# Patient Record
Sex: FEMALE | Race: WHITE | Employment: FULL TIME | URBAN - METROPOLITAN AREA
[De-identification: names, ages, dates, MRNs, and addresses within clinical notes are randomized per-mention and may not be internally consistent; named-entity substitution may affect disease eponyms.]

---
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
## 2021-03-09 ENCOUNTER — HOSPITAL ENCOUNTER (OUTPATIENT)
Facility: MEDICAL CENTER | Age: 21
Discharge: HOME | End: 2021-03-09
Payer: COMMERCIAL

## 2021-03-09 DIAGNOSIS — K90.9 IRON MALABSORPTION: ICD-10-CM

## 2021-03-09 DIAGNOSIS — D50.8 IRON DEFICIENCY ANEMIA SECONDARY TO INADEQUATE DIETARY IRON INTAKE: ICD-10-CM

## 2021-03-09 LAB
ABSOLUTE EOS #: 0.1 K/UL (ref 0–0.44)
ABSOLUTE IMMATURE GRANULOCYTE: 0.02 K/UL (ref 0–0.3)
ABSOLUTE LYMPH #: 1.6 K/UL (ref 1.2–5.2)
ABSOLUTE MONO #: 0.51 K/UL (ref 0.1–1.4)
BASOPHILS # BLD: 1 % (ref 0–2)
BASOPHILS ABSOLUTE: 0.05 K/UL (ref 0–0.2)
DIFFERENTIAL TYPE: NORMAL
EOSINOPHILS RELATIVE PERCENT: 2 % (ref 1–4)
FERRITIN: 28 UG/L (ref 13–150)
HCT VFR BLD CALC: 42.9 % (ref 36.3–47.1)
HEMOGLOBIN: 14.1 G/DL (ref 11.9–15.1)
IMMATURE GRANULOCYTES: 0 %
IRON SATURATION: 23 % (ref 20–55)
IRON: 75 UG/DL (ref 37–145)
LYMPHOCYTES # BLD: 26 % (ref 25–45)
MCH RBC QN AUTO: 28.3 PG (ref 25.2–33.5)
MCHC RBC AUTO-ENTMCNC: 32.9 G/DL (ref 28.4–34.8)
MCV RBC AUTO: 86 FL (ref 82.6–102.9)
MONOCYTES # BLD: 8 % (ref 2–8)
NRBC AUTOMATED: 0 PER 100 WBC
PDW BLD-RTO: 12.3 % (ref 11.8–14.4)
PLATELET # BLD: 225 K/UL (ref 138–453)
PLATELET ESTIMATE: NORMAL
PMV BLD AUTO: 9.4 FL (ref 8.1–13.5)
RBC # BLD: 4.99 M/UL (ref 3.95–5.11)
RBC # BLD: NORMAL 10*6/UL
SEG NEUTROPHILS: 63 % (ref 34–64)
SEGMENTED NEUTROPHILS ABSOLUTE COUNT: 3.91 K/UL (ref 1.8–8)
TOTAL IRON BINDING CAPACITY: 326 UG/DL (ref 250–450)
UNSATURATED IRON BINDING CAPACITY: 251 UG/DL (ref 112–347)
WBC # BLD: 6.2 K/UL (ref 4.5–13.5)
WBC # BLD: NORMAL 10*3/UL

## 2021-03-09 PROCEDURE — 85025 COMPLETE CBC W/AUTO DIFF WBC: CPT

## 2021-03-09 PROCEDURE — 83540 ASSAY OF IRON: CPT

## 2021-03-09 PROCEDURE — 83550 IRON BINDING TEST: CPT

## 2021-03-09 PROCEDURE — 36415 COLL VENOUS BLD VENIPUNCTURE: CPT

## 2021-03-09 PROCEDURE — 82728 ASSAY OF FERRITIN: CPT

## 2021-03-11 ENCOUNTER — HOSPITAL ENCOUNTER (OUTPATIENT)
Facility: MEDICAL CENTER | Age: 21
End: 2021-03-11
Payer: COMMERCIAL

## 2021-03-16 ENCOUNTER — OFFICE VISIT (OUTPATIENT)
Dept: ONCOLOGY | Age: 21
End: 2021-03-16
Payer: COMMERCIAL

## 2021-03-16 ENCOUNTER — TELEPHONE (OUTPATIENT)
Dept: ONCOLOGY | Age: 21
End: 2021-03-16

## 2021-03-16 VITALS
RESPIRATION RATE: 16 BRPM | WEIGHT: 243.8 LBS | BODY MASS INDEX: 34.98 KG/M2 | TEMPERATURE: 97.6 F | DIASTOLIC BLOOD PRESSURE: 97 MMHG | SYSTOLIC BLOOD PRESSURE: 136 MMHG | HEART RATE: 98 BPM

## 2021-03-16 DIAGNOSIS — D50.8 IRON DEFICIENCY ANEMIA SECONDARY TO INADEQUATE DIETARY IRON INTAKE: Primary | ICD-10-CM

## 2021-03-16 PROCEDURE — 99214 OFFICE O/P EST MOD 30 MIN: CPT | Performed by: INTERNAL MEDICINE

## 2021-03-16 PROCEDURE — 99211 OFF/OP EST MAY X REQ PHY/QHP: CPT | Performed by: INTERNAL MEDICINE

## 2021-03-16 NOTE — TELEPHONE ENCOUNTER
Donaldo Rizvi MD VISIT  DR Sg Arzate IN TO SEE PATIENT  ORDERS RECEIVED  BENTYL  RV 6 MONTHS W/LABS BEFORE  LABS CDP FE TIBC FERRITIN TO BE DONE WEEK PRIOR TO APPT, ORDERS GIVEN TO PATIENT  MD VISIT 9/16/21 @4PM   AVS PRINTED AND GIVEN TO PATIENT WITH INSTRUCTIONS  PATIENT DISCHARGED AMBULATORY

## 2021-03-16 NOTE — PROGRESS NOTES
_      Chief Complaint   Patient presents with    Follow-up    Discuss Labs     DIAGNOSIS:        Microcytic anemia  Iron deficiency  Heavy menstrual periods  Upper GI symptoms with intolerable side effects to oral iron     CURRENT THERAPY:         Status post IV iron infusion August 2020  Patient did not tolerate oral iron    BRIEF CASE HISTORY:      Ms. Henrene Seip is a very pleasant 24 y.o. female with history of multiple co morbidities as listed. Prophylactic left importance she has history of neurocardiogenic syncope and episodes of dizziness and headaches. The patient is referred for evaluation of microcytic anemia. Patient has weakness and fatigue. She has palpitation. Shortness of breath on exertion. Patient denies any active bleeding however she describes heavy menstrual periods with passage of clots. Patient feels worse during her menstrual periods. She has no GI bleeding. No melena or hematochezia. No hematemesis. No hematuria. No other complaints. No history of smoking or alcohol drinking. .     INTERIM HISTORY:   Patient seen for follow-up iron deficiency anemia. She could not tolerate oral iron. She received IV iron infusion in August 2020. She has significant improvement clinically. Recently started having weakness and fatigue. She is having heavy menstrual periods. Patient has no active bleeding otherwise. No melena or hematochezia. No hematemesis. Patient has episodes of abdominal pain likely irritable bowel syndrome. PAST MEDICAL HISTORY: has a past medical history of Allergic, Anxiety, Asthma, Cyst of ovary, right, Eczema, Headache, Hypoglycemia, Neurocardiogenic syncope, Pneumonia, Reflux esophagitis, and Seizures ([address]     PAST SURGICAL HISTORY: has a past surgical history that includes chest tube insertion (Left, 2006) and pr egd transoral biopsy single/multiple (N/A, 5/31/2018). CURRENT MEDICATIONS:  has a current medication list which includes the following prescription(s): low-ogestrel, omeprazole, sertraline, hydroxyzine, fluticasone, triamcinolone, albuterol sulfate hfa, and dicyclomine. ALLERGIES:  has No Known Allergies. FAMILY HISTORY: Grandfather had hairy cell leukemia. Grandmother had lymphoma. Grandfather had stomach cancer. Otherwise negative for any hematological or oncological conditions. SOCIAL HISTORY:  reports that she has never smoked. She has never used smokeless tobacco. She reports that she does not drink alcohol or use drugs. REVIEW OF SYSTEMS:     · General: Positive for weakness and fatigue. . No unanticipated weight loss or decreased appetite. No fever or chills. · Eyes: No blurred vision, eye pain or double vision. · Ears: No hearing problems or drainage. No tinnitus. · Throat: No sore throat, problems with swallowing or dysphagia. · Respiratory: No cough, sputum or hemoptysis. Positive for shortness of breath on exertion. No pleuritic chest pain. · Cardiovascular: No chest pain, orthopnea or PND. No lower extremity edema. Positive for palpitation. · Gastrointestinal: No problems with swallowing. No abdominal pain or bloating. No nausea or vomiting. No diarrhea or constipation. No GI bleeding. · Genitourinary: No dysuria, hematuria, frequency or urgency. · Musculoskeletal: No muscle aches or pains. No limitation of movement. No back pain. No gait disturbance, No joint complaints. · Dermatologic: No skin rashes or pruritus. No skin lesions or discolorations. · Psychiatric: No depression, anxiety, or stress or signs of schizophrenia. No change in mood or affect. · Hematologic: No history of bleeding tendency. No bruises or ecchymosis. No history of clotting problems. · Infectious disease: No fever, chills or frequent infections. · Endocrine: No polydipsia or polyuria. No temperature intolerance.   · Neurologic: No headaches or dizziness. No weakness or numbness of the extremities. No changes in balance, coordination,  memory, mentation, behavior. · Allergic/Immunologic: No nasal congestion or hives. No repeated infections. PHYSICAL EXAM:  The patient is not in acute distress. Vital signs: Blood pressure (!) 136/97, pulse 98, temperature 97.6 °F (36.4 °C), temperature source Temporal, resp. rate 16, weight 243 lb 12.8 oz (110.6 kg), not currently breastfeeding.      General appearance - well appearing, not in pain or distress  Mental status - good mood, alert and oriented  Eyes - pupils equal and reactive, extraocular eye movements intact  Ears - bilateral TM's and external ear canals normal  Nose - normal and patent, no erythema, discharge or polyps  Mouth - mucous membranes moist, pharynx normal without lesions  Neck - supple, no significant adenopathy  Lymphatics - no palpable lymphadenopathy, no hepatosplenomegaly  Chest - clear to auscultation, no wheezes, rales or rhonchi, symmetric air entry  Heart - normal rate, regular rhythm, normal S1, S2, no murmurs, rubs, clicks or gallops  Abdomen - soft, nontender, nondistended, no masses or organomegaly  Neurological - alert, oriented, normal speech, no focal findings or movement disorder noted  Musculoskeletal - no joint tenderness, deformity or swelling  Extremities - peripheral pulses normal, no pedal edema, no clubbing or cyanosis  Skin - normal coloration and turgor, no rashes, no suspicious skin lesions noted     Review of Diagnostic data:   Lab Results   Component Value Date    WBC 6.2 03/09/2021    HGB 14.1 03/09/2021    HCT 42.9 03/09/2021    MCV 86.0 03/09/2021     03/09/2021       Chemistry        Component Value Date/Time     08/04/2020    K 4.2 08/04/2020     08/04/2020    CO2 26 08/04/2020    BUN 10 08/04/2020    CREATININE 0.88 08/04/2020        Component Value Date/Time    CALCIUM 9.1 08/04/2020    ALKPHOS 75 08/04/2020 AST 14 08/04/2020    ALT 14 08/04/2020    BILITOT 0.4 08/04/2020            IMPRESSION:   Microcytic anemia  Iron deficiency  Heavy menstrual periods  Upper GI symptoms with intolerable side effects to oral iron  Possible irritable bowel syndrome. PLAN: I reviewed the labs as above and discussed with the patient. I explained to the patient the nature of this hematologic problem. I explained the significance of these abnormalities in layman language. As stated above patient has multiple health problems at this young age. She has upper GI symptoms as well. She cannot tolerate oral iron. Obviously she has clear evidence of iron deficiency anemia. Patient had very good response to IV iron infusion. I explained to the patient that we are persistent symptoms of fatigue cannot be explained by this anemia. Hemoglobin is now normal.   I expect further drop of hemoglobin in the future as a result of heavy menstrual periods. Recommendations for continued monitoring. We will repeat the labs in 3 to 4 months with iron studies. We will consider iron infusion if we see any significant drop. Patient's abdominal pain likely related to irritable bowel. She will use Bentyl as needed. Patient's questions were answered to the best of her satisfaction and she verbalized full understanding and agreement.

## 2021-05-08 DIAGNOSIS — R10.84 GENERALIZED ABDOMINAL PAIN: ICD-10-CM

## 2021-05-10 RX ORDER — DICYCLOMINE HCL 20 MG
TABLET ORAL
Qty: 60 TABLET | Refills: 0 | Status: SHIPPED | OUTPATIENT
Start: 2021-05-10 | End: 2021-11-15

## 2021-05-14 ENCOUNTER — OFFICE VISIT (OUTPATIENT)
Dept: FAMILY MEDICINE CLINIC | Age: 21
End: 2021-05-14
Payer: COMMERCIAL

## 2021-05-14 VITALS
HEART RATE: 80 BPM | WEIGHT: 250 LBS | SYSTOLIC BLOOD PRESSURE: 118 MMHG | OXYGEN SATURATION: 98 % | RESPIRATION RATE: 18 BRPM | HEIGHT: 70 IN | BODY MASS INDEX: 35.79 KG/M2 | DIASTOLIC BLOOD PRESSURE: 78 MMHG

## 2021-05-14 DIAGNOSIS — Z13.220 LIPID SCREENING: ICD-10-CM

## 2021-05-14 DIAGNOSIS — Z30.41 ENCOUNTER FOR SURVEILLANCE OF CONTRACEPTIVE PILLS: ICD-10-CM

## 2021-05-14 DIAGNOSIS — Z76.89 ENCOUNTER TO ESTABLISH CARE: Primary | ICD-10-CM

## 2021-05-14 DIAGNOSIS — R53.83 FATIGUE, UNSPECIFIED TYPE: ICD-10-CM

## 2021-05-14 DIAGNOSIS — G89.29 CHRONIC ABDOMINAL PAIN: ICD-10-CM

## 2021-05-14 DIAGNOSIS — Z13.29 THYROID DISORDER SCREEN: ICD-10-CM

## 2021-05-14 DIAGNOSIS — R51.9 DAILY HEADACHE: ICD-10-CM

## 2021-05-14 DIAGNOSIS — R10.9 CHRONIC ABDOMINAL PAIN: ICD-10-CM

## 2021-05-14 PROCEDURE — 99214 OFFICE O/P EST MOD 30 MIN: CPT | Performed by: NURSE PRACTITIONER

## 2021-05-14 RX ORDER — NORGESTREL AND ETHINYL ESTRADIOL 0.3-0.03MG
KIT ORAL
Qty: 28 TABLET | Refills: 5 | Status: SHIPPED | OUTPATIENT
Start: 2021-05-14

## 2021-05-14 ASSESSMENT — ENCOUNTER SYMPTOMS
NAUSEA: 0
SHORTNESS OF BREATH: 0
SINUS PAIN: 0
ABDOMINAL PAIN: 1
COUGH: 0
SORE THROAT: 0
DIARRHEA: 0
VOMITING: 0

## 2021-05-14 NOTE — PATIENT INSTRUCTIONS
Patient Education        Abdominal Pain: Care Instructions  Your Care Instructions     Abdominal pain has many possible causes. Some aren't serious and get better on their own in a few days. Others need more testing and treatment. If your pain continues or gets worse, you need to be rechecked and may need more tests to find out what is wrong. You may need surgery to correct the problem. Don't ignore new symptoms, such as fever, nausea and vomiting, urination problems, pain that gets worse, and dizziness. These may be signs of a more serious problem. Your doctor may have recommended a follow-up visit in the next 8 to 12 hours. If you are not getting better, you may need more tests or treatment. The doctor has checked you carefully, but problems can develop later. If you notice any problems or new symptoms, get medical treatment right away. Follow-up care is a key part of your treatment and safety. Be sure to make and go to all appointments, and call your doctor if you are having problems. It's also a good idea to know your test results and keep a list of the medicines you take. How can you care for yourself at home? · Rest until you feel better. · To prevent dehydration, drink plenty of fluids. Choose water and other caffeine-free clear liquids until you feel better. If you have kidney, heart, or liver disease and have to limit fluids, talk with your doctor before you increase the amount of fluids you drink. · If your stomach is upset, eat mild foods, such as rice, dry toast or crackers, bananas, and applesauce. Try eating several small meals instead of two or three large ones. · Wait until 48 hours after all symptoms have gone away before you have spicy foods, alcohol, and drinks that contain caffeine. · Do not eat foods that are high in fat. · Avoid anti-inflammatory medicines such as aspirin, ibuprofen (Advil, Motrin), and naproxen (Aleve). These can cause stomach upset.  Talk to your doctor if you

## 2021-05-14 NOTE — PROGRESS NOTES
[facility]  [address] [address]  Dept: [phone]  Dept Fax: [phone]    Stacia Marquez is a 24 y.o. female who presents today for her medicalconditions/complaints as noted below. Stacia Marquez is c/o of Established New Doctor, Referral - General (infectious disease Dr. Benjamin Marcus at [facility]. Vs), and Medication Refill (birth control)      HPI:       44-year-old female patient presents with complaint of encounter establish care. Patient reports significant history of anxiety, takes Zoloft, hydroxyzine has been stable on these medications denies any side effects. Patient currently on birth control, is nearly out of this medication. Reports current complaints of ongoing headaches, abdominal pain and fatigue. Reports these symptoms have been ongoing for several years. Patient previously seen gastroenterology pediatrics has upcoming follow-up with adult gastroenterology next month. Additional symptoms include feeling fatigued and a daily headache. Patient believes she needs an infectious disease referral to potentially figure out what is going on.       Past Medical History:   Diagnosis Date    Allergic     Anxiety     Asthma     Cyst of ovary, right     Eczema     Headache     Hypoglycemia     Neurocardiogenic syncope     Pneumonia     Reflux esophagitis     Seizures (HCC)         Current Outpatient Medications   Medication Sig Dispense Refill    LOW-OGESTREL 0.3-30 MG-MCG per tablet TAKE ONE TABLET BY MOUTH DAILY 28 tablet 5    dicyclomine (BENTYL) 20 MG tablet TAKE ONE TABLET BY MOUTH EVERY 6 TO 8 HOURS AS NEEDED FOR ABDOMINAL PAIN 60 tablet 0    omeprazole (PRILOSEC) 20 MG delayed release capsule TAKE ONE CAPSULE BY MOUTH TWICE A DAY 60 capsule 3    sertraline (ZOLOFT) 100 MG tablet TAKE TWO TABLETS BY MOUTH DAILY 60 tablet 4    hydrOXYzine (ATARAX) 50 MG tablet Take 1 tablet by mouth every 8 hours as needed for Anxiety 90 tablet 5    fluticasone (FLONASE) 50 MCG/ACT nasal spray SPRAY TWO SPRAYS IN EACH NOSTRIL ONCE DAILY (Patient taking differently: using PRN) 1 Bottle 5    triamcinolone (KENALOG) 0.025 % cream Apply topically 2 times daily. (Patient taking differently: Apply topically 2 times daily. /Using PRN) 80 g 5    albuterol sulfate HFA (VENTOLIN HFA) 108 (90 Base) MCG/ACT inhaler Inhale 2 puffs into the lungs every 6 hours as needed for Wheezing 1 Inhaler 2     No current facility-administered medications for this visit. No Known Allergies    Subjective:      Review of Systems   Constitutional: Positive for fatigue. Negative for chills and fever. HENT: Negative for ear pain, sinus pain and sore throat. Respiratory: Negative for cough and shortness of breath. Cardiovascular: Negative for chest pain and palpitations. Gastrointestinal: Positive for abdominal pain. Negative for diarrhea, nausea and vomiting. Neurological: Positive for headaches. Negative for dizziness. All other systems reviewed and are negative.      :Objective     Physical Exam  Vitals signs and nursing note reviewed. Constitutional:       General: She is not in acute distress. Appearance: Normal appearance. She is not ill-appearing or diaphoretic. HENT:      Mouth/Throat:      Mouth: Mucous membranes are moist.   Cardiovascular:      Rate and Rhythm: Normal rate. Pulmonary:      Effort: Pulmonary effort is normal.      Breath sounds: Normal breath sounds. Skin:     General: Skin is warm and dry. Neurological:      General: No focal deficit present. Mental Status: She is alert and oriented to person, place, and time.        /78   Pulse 80   Resp 18   Ht 5' 10\" (1.778 m)   Wt 250 lb (113.4 kg)   LMP 04/23/2021   SpO2 98%   BMI 35.87 kg/m²     Lab Review   Hospital Outpatient Visit on 03/09/2021   Component Date Value    WBC 03/09/2021 6.2     RBC 03/09/2021 4.99     Hemoglobin 03/09/2021 14.1     Hematocrit 03/09/2021 42.9     MCV 03/09/2021 86.0     MCH 03/09/2021 28.3     MCHC 03/09/2021 32.9     RDW 03/09/2021 12.3     Platelets 96/64/4659 225     MPV 03/09/2021 9.4     NRBC Automated 03/09/2021 0.0     Differential Type 03/09/2021 NOT REPORTED     Seg Neutrophils 03/09/2021 63     Lymphocytes 03/09/2021 26     Monocytes 03/09/2021 8     Eosinophils % 03/09/2021 2     Basophils 03/09/2021 1     Immature Granulocytes 03/09/2021 0     Segs Absolute 03/09/2021 3.91     Absolute Lymph # 03/09/2021 1.60     Absolute Mono # 03/09/2021 0.51     Absolute Eos # 03/09/2021 0.10     Basophils Absolute 03/09/2021 0.05     Absolute Immature Granul* 03/09/2021 0.02     WBC Morphology 03/09/2021 NOT REPORTED     RBC Morphology 03/09/2021 NOT REPORTED     Platelet Estimate 15/43/9015 NOT REPORTED     Ferritin 03/09/2021 28     Iron 03/09/2021 75     TIBC 03/09/2021 326     Iron Saturation 03/09/2021 23     UIBC 03/09/2021 [address] [place] [address] Outpatient Visit on 12/27/2020   Component Date Value    SARS-CoV-2, OZIEL 12/27/2020 Not Detected        Assessment and Plan      1. Encounter to establish care  2. Fatigue, unspecified type  -     Comprehensive Metabolic Panel; Future  -     Vitamin D 25 Hydroxy; Future  -     Vitamin B12; Future  -     Rheumatoid Factor; Future  -     JAME Screen With Reflex; Future  3. Daily headache  4. Chronic abdominal pain  5. Lipid screening  -     Lipid Panel; Future  6. Thyroid disorder screen  -     TSH With Reflex Ft4; Future  7. Encounter for surveillance of contraceptive pills  -     LOW-OGESTREL 0.3-30 MG-MCG per tablet; TAKE ONE TABLET BY MOUTH DAILY, Disp-28 tablet, R-5, DAWNormal      We will check labs, recommend follow-up with gastroenterology, hematology      Return in about 2 months (around 7/14/2021), or if symptoms worsen or fail to improve.         Orders Placed This Encounter   Medications    LOW-OGESTREL 0.3-30 MG-MCG per tablet Sig: TAKE ONE TABLET BY MOUTH DAILY     Dispense:  28 tablet     Refill:  5        Patient given educational materials - see patient instructions. Discussed use, benefit, and side effects of prescribed medications. All patientquestions answered. Pt voiced understanding. Patient given educational materials - see patient instructions. Discussed use, benefit, and side effects of prescribed medications. All patientquestions answered. Pt voiced understanding. This note was transcribed using dictation with Dragon services. Efforts were made to correct any errors but some words may be misinterpreted.     Electronically signed by URIEL Hodgson CNP on 5/14/2021at 12:08 PM

## 2021-05-14 NOTE — PROGRESS NOTES
Visit Information    Have you changed or started any medications since your last visit including any over-the-counter medicines, vitamins, or herbal medicines? no   Are you having any side effects from any of your medications? -  no  Have you stopped taking any of your medications? Is so, why? -  no    Have you seen any other physician or provider since your last visit? No  Have you had any other diagnostic tests since your last visit? No  Have you been seen in the emergency room and/or had an admission to a hospital since we last saw you? No  Have you had your routine dental cleaning in the past 6 months? no    Have you activated your [employer] account? If not, what are your barriers?  Yes     Patient Care Team:  URIEL Garzon - CNP as PCP - General (Certified Nurse Practitioner)  Tulio Rebollar DO as PCP [facility]    Medical History Review  Past Medical, Family, and Social History reviewed and does contribute to the patient presenting condition    Health Maintenance   Topic Date Due    Hepatitis C screen  Never done    COVID-19 Vaccine (1) Never done    HIV screen  Never done    Chlamydia screen  Never done    Flu vaccine (Season Ended) 09/01/2021    DTaP/Tdap/Td vaccine (7 - Td) 06/04/2023    Hepatitis A vaccine  Completed    Hepatitis B vaccine  Completed    Hib vaccine  Completed    HPV vaccine  Completed    Varicella vaccine  Completed    Meningococcal (ACWY) vaccine  Aged Out    Pneumococcal 0-64 years Vaccine  Aged Out

## 2021-05-27 ENCOUNTER — OFFICE VISIT (OUTPATIENT)
Dept: PRIMARY CARE CLINIC | Age: 21
End: 2021-05-27
Payer: COMMERCIAL

## 2021-05-27 VITALS
HEIGHT: 70 IN | SYSTOLIC BLOOD PRESSURE: 140 MMHG | OXYGEN SATURATION: 98 % | TEMPERATURE: 97.2 F | DIASTOLIC BLOOD PRESSURE: 86 MMHG | BODY MASS INDEX: 35.79 KG/M2 | WEIGHT: 250 LBS | HEART RATE: 85 BPM

## 2021-05-27 DIAGNOSIS — R10.9 ABDOMINAL CRAMPING: ICD-10-CM

## 2021-05-27 DIAGNOSIS — B34.9 VIRAL ILLNESS: Primary | ICD-10-CM

## 2021-05-27 DIAGNOSIS — J02.9 SORE THROAT: ICD-10-CM

## 2021-05-27 LAB — S PYO AG THROAT QL: NORMAL

## 2021-05-27 PROCEDURE — 87880 STREP A ASSAY W/OPTIC: CPT | Performed by: NURSE PRACTITIONER

## 2021-05-27 PROCEDURE — 99213 OFFICE O/P EST LOW 20 MIN: CPT | Performed by: NURSE PRACTITIONER

## 2021-05-27 RX ORDER — DICYCLOMINE HYDROCHLORIDE 10 MG/1
10 CAPSULE ORAL
Qty: 120 CAPSULE | Refills: 0 | Status: SHIPPED | OUTPATIENT
Start: 2021-05-27 | End: 2021-11-15

## 2021-05-27 RX ORDER — PREDNISONE 20 MG/1
40 TABLET ORAL DAILY
Qty: 10 TABLET | Refills: 0 | Status: SHIPPED | OUTPATIENT
Start: 2021-05-27 | End: 2021-06-01

## 2021-05-27 ASSESSMENT — ENCOUNTER SYMPTOMS
ABDOMINAL PAIN: 1
VOICE CHANGE: 0
EYE REDNESS: 0
SORE THROAT: 1
WHEEZING: 0
EYE DISCHARGE: 0
COUGH: 0
SINUS PRESSURE: 0
SHORTNESS OF BREATH: 0
CHEST TIGHTNESS: 0

## 2021-05-27 NOTE — PROGRESS NOTES
[facility] [address] [place] [facility]  [address] [address]  Dept: [phone]  Dept Fax: [phone]     Naomy Cabezas is a 24 y.o. female who presents to the urgent care today for her medicalconditions/complaints as noted below. Naomy Cabezas is c/o of Pharyngitis (pt has been having slight cough sore throat and some head congestion )    HPI:      Pharyngitis  This is a new problem. The current episode started in the past 7 days. The problem has been unchanged. Associated symptoms include abdominal pain (cramping intermittently, worsened since starting the motegrity), congestion and a sore throat. Pertinent negatives include no chest pain, chills, coughing, fatigue, fever, headaches, myalgias, rash or weakness. The symptoms are aggravated by drinking, eating and swallowing. She has tried nothing for the symptoms. The treatment provided no relief.      Past Medical History:   Diagnosis Date    Allergic     Anxiety     Asthma     Cyst of ovary, right     Eczema     Headache     Hypoglycemia     Neurocardiogenic syncope     Pneumonia     Reflux esophagitis     Seizures (HCC)       Current Outpatient Medications   Medication Sig Dispense Refill    dicyclomine (BENTYL) 10 MG capsule Take 1 capsule by mouth 4 times daily (before meals and nightly) 120 capsule 0    predniSONE (DELTASONE) 20 MG tablet Take 2 tablets by mouth daily for 5 days 10 tablet 0    LOW-OGESTREL 0.3-30 MG-MCG per tablet TAKE ONE TABLET BY MOUTH DAILY 28 tablet 5    dicyclomine (BENTYL) 20 MG tablet TAKE ONE TABLET BY MOUTH EVERY 6 TO 8 HOURS AS NEEDED FOR ABDOMINAL PAIN 60 tablet 0    omeprazole (PRILOSEC) 20 MG delayed release capsule TAKE ONE CAPSULE BY MOUTH TWICE A DAY 60 capsule 3    sertraline (ZOLOFT) 100 MG tablet TAKE TWO TABLETS BY MOUTH DAILY 60 tablet 4    hydrOXYzine (ATARAX) 50 MG tablet Take 1 tablet by mouth every 8 hours as needed for Anxiety 90 tablet 5    fluticasone (FLONASE) 50 MCG/ACT nasal spray SPRAY TWO SPRAYS IN EACH NOSTRIL ONCE DAILY (Patient taking differently: using PRN) 1 Bottle 5    triamcinolone (KENALOG) 0.025 % cream Apply topically 2 times daily. (Patient taking differently: Apply topically 2 times daily. /Using PRN) 80 g 5    albuterol sulfate HFA (VENTOLIN HFA) 108 (90 Base) MCG/ACT inhaler Inhale 2 puffs into the lungs every 6 hours as needed for Wheezing 1 Inhaler 2     No current facility-administered medications for this visit. No Known Allergies    Reviewed PMH, SH, and FH with the patient and updated. Subjective:      Review of Systems   Constitutional: Negative for chills, fatigue and fever. HENT: Positive for congestion, postnasal drip and sore throat. Negative for ear discharge, ear pain, sinus pressure, sneezing and voice change. Eyes: Negative for discharge and redness. Respiratory: Negative for cough, chest tightness, shortness of breath and wheezing. Cardiovascular: Negative. Negative for chest pain. Gastrointestinal: Positive for abdominal pain (cramping intermittently, worsened since starting the motegrity). Musculoskeletal: Negative for myalgias. Skin: Negative for rash. Neurological: Negative for dizziness, weakness, light-headedness and headaches. Hematological: Negative for adenopathy. All other systems reviewed and are negative. Objective:      Physical Exam  Vitals and nursing note reviewed. Constitutional:       General: She is not in acute distress. Appearance: Normal appearance. She is well-developed. She is not ill-appearing, toxic-appearing or diaphoretic. HENT:      Head: Normocephalic. Right Ear: Tympanic membrane and external ear normal.      Left Ear: Tympanic membrane and external ear normal.      Nose: Nose normal.      Right Sinus: No maxillary sinus tenderness or frontal sinus tenderness. Left Sinus: No maxillary sinus tenderness or frontal sinus tenderness. Dispense:  10 tablet     Refill:  0        Patient given educational materials - see patient instructions. Discussed use, benefit, and side effects of prescribed medications. All patientquestions answered. Pt voiced understanding.     Electronically signed by URIEL Lerma CNP on 5/27/2021at 6:42 PM

## 2021-05-27 NOTE — PATIENT INSTRUCTIONS
Patient Education        Viral Infections: Care Instructions  Your Care Instructions     You don't feel well, but it's not clear what's causing it. You may have a viral infection. Viruses cause many illnesses, such as the common cold, influenza, fever, rashes, and the diarrhea, nausea, and vomiting that are often called \"stomach flu. \" You may wonder if antibiotic medicines could make you feel better. But antibiotics only treat infections caused by bacteria. They don't work on viruses. The good news is that viral infections usually aren't serious. Most will go away in a few days without medical treatment. In the meantime, there are a few things you can do to make yourself more comfortable. Follow-up care is a key part of your treatment and safety. Be sure to make and go to all appointments, and call your doctor if you are having problems. It's also a good idea to know your test results and keep a list of the medicines you take. How can you care for yourself at home? · Get plenty of rest if you feel tired. · Take an over-the-counter pain medicine if needed, such as acetaminophen (Tylenol), ibuprofen (Advil, Motrin), or naproxen (Aleve). Read and follow all instructions on the label. · Be careful when taking over-the-counter cold or flu medicines and Tylenol at the same time. Many of these medicines have acetaminophen, which is Tylenol. Read the labels to make sure that you are not taking more than the recommended dose. Too much acetaminophen (Tylenol) can be harmful. · Drink plenty of fluids. If you have kidney, heart, or liver disease and have to limit fluids, talk with your doctor before you increase the amount of fluids you drink. · Stay home from work, school, and other public places while you have a fever. When should you call for help? Call 911 anytime you think you may need emergency care. For example, call if:    · You have severe trouble breathing.     · You passed out (lost consciousness). Call your doctor now or seek immediate medical care if:    · You seem to be getting much sicker.     · You have a new or higher fever.     · You have blood in your stools.     · You have new belly pain, or your pain gets worse.     · You have a new rash. Watch closely for changes in your health, and be sure to contact your doctor if:    · You start to get better and then get worse.     · You do not get better as expected. Where can you learn more? Go to https://TechnoratipeOnovative.SoLatina. org and sign in to your [employer] account. Enter H886 in the [facility] box to learn more about \"Viral Infections: Care Instructions. \"     If you do not have an account, please click on the \"Sign Up Now\" link. Current as of: September 23, 2020               Content Version: 12.8  © 3582-0658 Healthwise, Incorporated. Care instructions adapted under license by [facility] ([facility]). If you have questions about a medical condition or this instruction, always ask your healthcare professional. [address] any warranty or liability for your use of this information.

## 2021-05-27 NOTE — PROGRESS NOTES
[facility]  [address] [address]  Dept: [phone]  Dept Fax: [phone]     Joy Poe is a 24 y.o. female who presents to the urgent care today for her medicalconditions/complaints as noted below. Joy Poe is c/o of Pharyngitis (pt has been having slight cough sore throat and some head congestion )    HPI:      HPI    Past Medical History:   Diagnosis Date    Allergic     Anxiety     Asthma     Cyst of ovary, right     Eczema     Headache     Hypoglycemia     Neurocardiogenic syncope     Pneumonia     Reflux esophagitis     Seizures (HCC)            Current Outpatient Medications   Medication Sig Dispense Refill    LOW-OGESTREL 0.3-30 MG-MCG per tablet TAKE ONE TABLET BY MOUTH DAILY 28 tablet 5    dicyclomine (BENTYL) 20 MG tablet TAKE ONE TABLET BY MOUTH EVERY 6 TO 8 HOURS AS NEEDED FOR ABDOMINAL PAIN 60 tablet 0    omeprazole (PRILOSEC) 20 MG delayed release capsule TAKE ONE CAPSULE BY MOUTH TWICE A DAY 60 capsule 3    sertraline (ZOLOFT) 100 MG tablet TAKE TWO TABLETS BY MOUTH DAILY 60 tablet 4    hydrOXYzine (ATARAX) 50 MG tablet Take 1 tablet by mouth every 8 hours as needed for Anxiety 90 tablet 5    fluticasone (FLONASE) 50 MCG/ACT nasal spray SPRAY TWO SPRAYS IN EACH NOSTRIL ONCE DAILY (Patient taking differently: using PRN) 1 Bottle 5    triamcinolone (KENALOG) 0.025 % cream Apply topically 2 times daily. (Patient taking differently: Apply topically 2 times daily. /Using PRN) 80 g 5    albuterol sulfate HFA (VENTOLIN HFA) 108 (90 Base) MCG/ACT inhaler Inhale 2 puffs into the lungs every 6 hours as needed for Wheezing 1 Inhaler 2     No current facility-administered medications for this visit.      No Known Allergies    Subjective:      Review of Systems    Objective:      Physical Exam  BP (!) 140/86 (Site: Left Upper Arm, Position: Sitting, Cuff Size: Large Adult)   Pulse 85   Temp 97.2 °F (36.2 °C) (Tympanic)   Ht 5' 10\" (1.778 m)   Wt 250 lb (113.4 kg)   SpO2 98%   Breastfeeding No   BMI 35.87 kg/m²     Assessment:       Diagnosis Orders   1. Sore throat  POCT rapid strep A       Plan:      No follow-ups on file. No orders of the defined types were placed in this encounter. Patient given educational materials - see patient instructions. Discussed use, benefit, and side effects of prescribed medications. All patientquestions answered. Pt voiced understanding.     Electronically signed by URIEL Varner CNP on 5/27/2021at 5:21 PM

## 2021-06-02 DIAGNOSIS — B96.89 ACUTE BACTERIAL SINUSITIS: Primary | ICD-10-CM

## 2021-06-02 DIAGNOSIS — J01.90 ACUTE BACTERIAL SINUSITIS: Primary | ICD-10-CM

## 2021-06-02 RX ORDER — AMOXICILLIN AND CLAVULANATE POTASSIUM 875; 125 MG/1; MG/1
1 TABLET, FILM COATED ORAL 2 TIMES DAILY
Qty: 20 TABLET | Refills: 0 | Status: SHIPPED | OUTPATIENT
Start: 2021-06-02 | End: 2021-06-12

## 2021-06-08 ENCOUNTER — OFFICE VISIT (OUTPATIENT)
Dept: FAMILY MEDICINE CLINIC | Age: 21
End: 2021-06-08
Payer: COMMERCIAL

## 2021-06-08 VITALS
WEIGHT: 250 LBS | HEIGHT: 70 IN | SYSTOLIC BLOOD PRESSURE: 154 MMHG | OXYGEN SATURATION: 98 % | BODY MASS INDEX: 35.79 KG/M2 | HEART RATE: 111 BPM | DIASTOLIC BLOOD PRESSURE: 86 MMHG

## 2021-06-08 DIAGNOSIS — R42 DIZZINESS: Primary | ICD-10-CM

## 2021-06-08 PROCEDURE — 93000 ELECTROCARDIOGRAM COMPLETE: CPT | Performed by: NURSE PRACTITIONER

## 2021-06-08 PROCEDURE — 99213 OFFICE O/P EST LOW 20 MIN: CPT | Performed by: NURSE PRACTITIONER

## 2021-06-08 SDOH — ECONOMIC STABILITY: FOOD INSECURITY: WITHIN THE PAST 12 MONTHS, THE FOOD YOU BOUGHT JUST DIDN'T LAST AND YOU DIDN'T HAVE MONEY TO GET MORE.: NEVER TRUE

## 2021-06-08 SDOH — ECONOMIC STABILITY: FOOD INSECURITY: WITHIN THE PAST 12 MONTHS, YOU WORRIED THAT YOUR FOOD WOULD RUN OUT BEFORE YOU GOT MONEY TO BUY MORE.: NEVER TRUE

## 2021-06-08 ASSESSMENT — ENCOUNTER SYMPTOMS
CHEST TIGHTNESS: 1
VOMITING: 0
NAUSEA: 0
SHORTNESS OF BREATH: 1
COUGH: 0
ABDOMINAL DISTENTION: 1
DIARRHEA: 1

## 2021-06-08 ASSESSMENT — SOCIAL DETERMINANTS OF HEALTH (SDOH): HOW HARD IS IT FOR YOU TO PAY FOR THE VERY BASICS LIKE FOOD, HOUSING, MEDICAL CARE, AND HEATING?: NOT HARD AT ALL

## 2021-06-08 NOTE — PATIENT INSTRUCTIONS
Patient Education        Dizziness: Care Instructions  Your Care Instructions  Dizziness is the feeling of unsteadiness or fuzziness in your head. It is different than having vertigo, which is a feeling that the room is spinning or that you are moving or falling. It is also different from lightheadedness, which is the feeling that you are about to faint. It can be hard to know what causes dizziness. Some people feel dizzy when they have migraine headaches. Sometimes bouts of flu can make you feel dizzy. Some medical conditions, such as heart problems or high blood pressure, can make you feel dizzy. Many medicines can cause dizziness, including medicines for high blood pressure, pain, or anxiety. If a medicine causes your symptoms, your doctor may recommend that you stop or change the medicine. If it is a problem with your heart, you may need medicine to help your heart work better. If there is no clear reason for your symptoms, your doctor may suggest watching and waiting for a while to see if the dizziness goes away on its own. Follow-up care is a key part of your treatment and safety. Be sure to make and go to all appointments, and call your doctor if you are having problems. It's also a good idea to know your test results and keep a list of the medicines you take. How can you care for yourself at home? · If your doctor recommends or prescribes medicine, take it exactly as directed. Call your doctor if you think you are having a problem with your medicine. · Do not drive while you feel dizzy. · Try to prevent falls. Steps you can take include:  ? Using nonskid mats, adding grab bars near the tub, and using night-lights. ? Clearing your home so that walkways are free of anything you might trip on.  ? Letting family and friends know that you have been feeling dizzy. This will help them know how to help you. When should you call for help? Call 911 anytime you think you may need emergency care.  For example, call if:    · You passed out (lost consciousness).     · You have dizziness along with symptoms of a heart attack. These may include:  ? Chest pain or pressure, or a strange feeling in the chest.  ? Sweating. ? Shortness of breath. ? Nausea or vomiting. ? Pain, pressure, or a strange feeling in the back, neck, jaw, or upper belly or in one or both shoulders or arms. ? Lightheadedness or sudden weakness. ? A fast or irregular heartbeat.     · You have symptoms of a stroke. These may include:  ? Sudden numbness, tingling, weakness, or loss of movement in your face, arm, or leg, especially on only one side of your body. ? Sudden vision changes. ? Sudden trouble speaking. ? Sudden confusion or trouble understanding simple statements. ? Sudden problems with walking or balance. ? A sudden, severe headache that is different from past headaches. Call your doctor now or seek immediate medical care if:    · You feel dizzy and have a fever, headache, or ringing in your ears.     · You have new or increased nausea and vomiting.     · Your dizziness does not go away or comes back. Watch closely for changes in your health, and be sure to contact your doctor if:    · You do not get better as expected. Where can you learn more? Go to https://Almondy.eVoter. org and sign in to your [employer] account. Enter D519 in the [facility] box to learn more about \"Dizziness: Care Instructions. \"     If you do not have an account, please click on the \"Sign Up Now\" link. Current as of: February 26, 2020               Content Version: 12.8  © 4261-4374 [employer]. Care instructions adapted under license by [facility] ([facility]). If you have questions about a medical condition or this instruction, always ask your healthcare professional. [place] [address] any warranty or liability for your use of this information.

## 2021-06-08 NOTE — LETTER
[address] [place] [address]  [place] [address]  Phone: [phone]  Fax: [phone]    URIEL Dumont CNP        June 8, 2021     Patient: Lydia Burr   YOB: 2000   Date of Visit: 6/8/2021       To Whom It May Concern: It is my medical opinion that Lydia Burr is excused through 6/9/21. If you have any questions or concerns, please don't hesitate to call.     Sincerely,        URIEL Dumont CNP

## 2021-06-08 NOTE — PROGRESS NOTES
[address]  [address] [address]  Dept: [phone]  Dept Fax: [phone]    Gayle Paz is a 24 y.o. female who presents today for her medicalconditions/complaints as noted below. Gayle Paz is c/o of Annual Exam, Letter for School/Work, Dizziness, and Palpitations      HPI:         24 y.o Patient-year-old female patient presents with complaint of dizziness. Reports that starting today she developed dizziness, palpitations chest pain or shortness of breath. Reports that she felt off balance and felt flushed. Had some severe abdominal discomfort at that time which has improved throughout the day. Patient left work early. Patient reportedly has seen improvement in her symptoms since the onset. Currently feels dizziness palpitations and feeling flushed. Patient's blood pressure heart rate slightly elevated at presentation. Evaluated for encounter establish care had labs ordered which were not yet completed.       Past Medical History:   Diagnosis Date    Allergic     Anxiety     Asthma     Cyst of ovary, right     Eczema     Headache     Hypoglycemia     Neurocardiogenic syncope     Pneumonia     Reflux esophagitis     Seizures (HCC)         Current Outpatient Medications   Medication Sig Dispense Refill    amoxicillin-clavulanate (AUGMENTIN) 875-125 MG per tablet Take 1 tablet by mouth 2 times daily for 10 days 20 tablet 0    dicyclomine (BENTYL) 10 MG capsule Take 1 capsule by mouth 4 times daily (before meals and nightly) 120 capsule 0    LOW-OGESTREL 0.3-30 MG-MCG per tablet TAKE ONE TABLET BY MOUTH DAILY 28 tablet 5    dicyclomine (BENTYL) 20 MG tablet TAKE ONE TABLET BY MOUTH EVERY 6 TO 8 HOURS AS NEEDED FOR ABDOMINAL PAIN 60 tablet 0    omeprazole (PRILOSEC) 20 MG delayed release capsule TAKE ONE CAPSULE BY MOUTH TWICE A DAY 60 capsule 3    sertraline (ZOLOFT) 100 MG tablet TAKE TWO TABLETS BY MOUTH DAILY 60 tablet 4    hydrOXYzine (ATARAX) 50 MG tablet Take 1 tablet by mouth every 8 hours as needed for Anxiety 90 tablet 5    fluticasone (FLONASE) 50 MCG/ACT nasal spray SPRAY TWO SPRAYS IN EACH NOSTRIL ONCE DAILY (Patient taking differently: using PRN) 1 Bottle 5    triamcinolone (KENALOG) 0.025 % cream Apply topically 2 times daily. (Patient taking differently: Apply topically 2 times daily. /Using PRN) 80 g 5    albuterol sulfate HFA (VENTOLIN HFA) 108 (90 Base) MCG/ACT inhaler Inhale 2 puffs into the lungs every 6 hours as needed for Wheezing 1 Inhaler 2     No current facility-administered medications for this visit. No Known Allergies    Subjective:      Review of Systems   Constitutional: Positive for fatigue. Negative for chills and fever. Respiratory: Positive for chest tightness and shortness of breath. Negative for cough. Cardiovascular: Positive for chest pain and palpitations. Gastrointestinal: Positive for abdominal distention and diarrhea. Negative for nausea and vomiting. Neurological: Positive for dizziness and headaches. Negative for light-headedness. All other systems reviewed and are negative.      :Objective     Physical Exam  Vitals and nursing note reviewed. Constitutional:       General: She is not in acute distress. Appearance: Normal appearance. She is not toxic-appearing. HENT:      Mouth/Throat:      Mouth: Mucous membranes are moist.   Cardiovascular:      Rate and Rhythm: Normal rate. Pulmonary:      Effort: Pulmonary effort is normal. No respiratory distress. Breath sounds: Normal breath sounds. Skin:     General: Skin is warm and dry. Neurological:      General: No focal deficit present. Mental Status: She is alert and oriented to person, place, and time.        BP (!) 154/86   Pulse (!) 111   Ht 5' 10\" (1.778 m)   Wt 250 lb (113.4 kg)   SpO2 98%   BMI 35.87 kg/m²     Lab Review   Lab Results   Component Value Date     08/04/2020    K 4.2 08/04/2020     08/04/2020    CO2 26 08/04/2020    BUN 10 08/04/2020    CREATININE 0.88 08/04/2020    GLUCOSE 97 08/04/2020    CALCIUM 9.1 08/04/2020       Assessment and Plan      1. Dizziness  -     CBC With Auto Differential; Future  -     EKG 12 Lead     EKG with an office sinus rhythm. Orders for labs, printed from previous visit along with CBC from today  PAperwork completed, letter for off work sent  Recommend to d/c abx for sinus, hydration, bentyl prn              No results found for this visit on 06/08/21. Return if symptoms worsen or fail to improve. No orders of the defined types were placed in this encounter. Patient given educational materials - see patient instructions. Discussed use, benefit, and side effects of prescribed medications. All patientquestions answered. Pt voiced understanding. Patient given educational materials - see patient instructions. Discussed use, benefit, and side effects of prescribed medications. All patientquestions answered. Pt voiced understanding. This note was transcribed using dictation with Dragon services. Efforts were made to correct any errors but some words may be misinterpreted.     Electronically signed by URIEL Armas CNP on 6/8/2021at 4:49 PM

## 2021-06-09 LAB
ALBUMIN SERPL-MCNC: 4.1 G/DL
ALP BLD-CCNC: 63 U/L
ALT SERPL-CCNC: 16 U/L
ANION GAP SERPL CALCULATED.3IONS-SCNC: 11 MMOL/L
AST SERPL-CCNC: 13 U/L
BASOPHILS ABSOLUTE: NORMAL
BASOPHILS RELATIVE PERCENT: NORMAL
BILIRUB SERPL-MCNC: 0.5 MG/DL (ref 0.1–1.4)
BUN BLDV-MCNC: 9 MG/DL
CALCIUM SERPL-MCNC: 9.3 MG/DL
CHLORIDE BLD-SCNC: 107 MMOL/L
CHOLESTEROL, TOTAL: 215 MG/DL
CHOLESTEROL/HDL RATIO: 6.1
CO2: 22 MMOL/L
CREAT SERPL-MCNC: 0.79 MG/DL
EOSINOPHILS ABSOLUTE: NORMAL
EOSINOPHILS RELATIVE PERCENT: NORMAL
GFR CALCULATED: NORMAL
GLUCOSE BLD-MCNC: 97 MG/DL
HCT VFR BLD CALC: NORMAL %
HDLC SERPL-MCNC: 35 MG/DL (ref 35–70)
HEMOGLOBIN: NORMAL
LDL CHOLESTEROL CALCULATED: 151 MG/DL (ref 0–160)
LYMPHOCYTES ABSOLUTE: NORMAL
LYMPHOCYTES RELATIVE PERCENT: NORMAL
MCH RBC QN AUTO: NORMAL PG
MCHC RBC AUTO-ENTMCNC: NORMAL G/DL
MCV RBC AUTO: NORMAL FL
MONOCYTES ABSOLUTE: NORMAL
MONOCYTES RELATIVE PERCENT: NORMAL
NEUTROPHILS ABSOLUTE: NORMAL
NEUTROPHILS RELATIVE PERCENT: NORMAL
NONHDLC SERPL-MCNC: ABNORMAL MG/DL
PDW BLD-RTO: NORMAL %
PLATELET # BLD: NORMAL 10*3/UL
PMV BLD AUTO: NORMAL FL
POTASSIUM SERPL-SCNC: 3.7 MMOL/L
RBC # BLD: NORMAL 10*6/UL
RHEUMATOID FACTOR: <10
SODIUM BLD-SCNC: 140 MMOL/L
TOTAL PROTEIN: 7
TRIGL SERPL-MCNC: 147 MG/DL
TSH SERPL DL<=0.05 MIU/L-ACNC: 2.12 UIU/ML
VITAMIN B-12: 219
VITAMIN D 25-HYDROXY: 12
VITAMIN D2, 25 HYDROXY: ABNORMAL
VITAMIN D3,25 HYDROXY: ABNORMAL
VLDLC SERPL CALC-MCNC: 29 MG/DL
WBC # BLD: NORMAL 10*3/UL

## 2021-06-11 DIAGNOSIS — Z13.220 LIPID SCREENING: ICD-10-CM

## 2021-06-11 DIAGNOSIS — R42 DIZZINESS: ICD-10-CM

## 2021-06-11 DIAGNOSIS — R53.83 FATIGUE, UNSPECIFIED TYPE: ICD-10-CM

## 2021-06-11 DIAGNOSIS — Z13.29 THYROID DISORDER SCREEN: ICD-10-CM

## 2021-06-11 DIAGNOSIS — R79.89 LOW SERUM VITAMIN D: Primary | ICD-10-CM

## 2021-06-11 RX ORDER — MELATONIN
1000 DAILY
Qty: 30 TABLET | Refills: 2 | Status: SHIPPED | OUTPATIENT
Start: 2021-06-11

## 2021-06-14 ENCOUNTER — HOSPITAL ENCOUNTER (OUTPATIENT)
Age: 21
Setting detail: SPECIMEN
Discharge: HOME | End: 2021-06-14
Payer: COMMERCIAL

## 2021-06-14 ENCOUNTER — OFFICE VISIT (OUTPATIENT)
Dept: PRIMARY CARE CLINIC | Age: 21
End: 2021-06-14
Payer: COMMERCIAL

## 2021-06-14 VITALS
DIASTOLIC BLOOD PRESSURE: 84 MMHG | SYSTOLIC BLOOD PRESSURE: 138 MMHG | OXYGEN SATURATION: 97 % | BODY MASS INDEX: 35.79 KG/M2 | HEART RATE: 100 BPM | HEIGHT: 70 IN | WEIGHT: 250 LBS | TEMPERATURE: 97.6 F

## 2021-06-14 DIAGNOSIS — R39.9 UTI SYMPTOMS: ICD-10-CM

## 2021-06-14 DIAGNOSIS — N30.01 ACUTE CYSTITIS WITH HEMATURIA: Primary | ICD-10-CM

## 2021-06-14 PROCEDURE — 81002 URINALYSIS NONAUTO W/O SCOPE: CPT | Performed by: NURSE PRACTITIONER

## 2021-06-14 PROCEDURE — 99213 OFFICE O/P EST LOW 20 MIN: CPT | Performed by: NURSE PRACTITIONER

## 2021-06-14 RX ORDER — CEPHALEXIN 500 MG/1
500 CAPSULE ORAL 2 TIMES DAILY
Qty: 14 CAPSULE | Refills: 0 | Status: SHIPPED | OUTPATIENT
Start: 2021-06-14 | End: 2021-06-21

## 2021-06-14 NOTE — PROGRESS NOTES
8 hours as needed for Anxiety 90 tablet 5    fluticasone (FLONASE) 50 MCG/ACT nasal spray SPRAY TWO SPRAYS IN EACH NOSTRIL ONCE DAILY (Patient taking differently: using PRN) 1 Bottle 5    triamcinolone (KENALOG) 0.025 % cream Apply topically 2 times daily. (Patient taking differently: Apply topically 2 times daily. /Using PRN) 80 g 5    albuterol sulfate HFA (VENTOLIN HFA) 108 (90 Base) MCG/ACT inhaler Inhale 2 puffs into the lungs every 6 hours as needed for Wheezing 1 Inhaler 2     No current facility-administered medications for this visit. No Known Allergies    Reviewed PMH, SH, and FH with the patient and updated. Subjective:      Review of Systems   Constitutional: Negative for chills, fatigue and fever. Respiratory: Negative. Negative for cough, chest tightness, shortness of breath and wheezing. Cardiovascular: Negative. Negative for chest pain. Gastrointestinal: Positive for constipation (chronic, working with digestive health) and diarrhea (chronic, working with digestive health). Negative for abdominal pain, nausea and vomiting. Genitourinary: Positive for dysuria, flank pain and frequency. Negative for difficulty urinating, hematuria, urgency and vaginal discharge. Skin: Negative for rash. All other systems reviewed and are negative. Objective:      Physical Exam  Vitals and nursing note reviewed. Constitutional:       General: She is not in acute distress. Appearance: She is well-developed. She is not diaphoretic. HENT:      Head: Normocephalic and atraumatic. Cardiovascular:      Rate and Rhythm: Normal rate and regular rhythm. Heart sounds: Normal heart sounds. No murmur heard. Pulmonary:      Effort: Pulmonary effort is normal. No respiratory distress. Breath sounds: Normal breath sounds. No wheezing. Abdominal:      General: Abdomen is flat. Bowel sounds are normal. There is no distension. Palpations: Abdomen is soft. Tenderness:  There is no abdominal tenderness. There is right CVA tenderness (mild) and left CVA tenderness (mild). Skin:     General: Skin is warm and dry. Neurological:      Mental Status: She is alert. /84 (Site: Left Upper Arm, Position: Sitting, Cuff Size: Large Adult)   Pulse 100   Temp 97.6 °F (36.4 °C) (Tympanic)   Ht 5' 10\" (1.778 m)   Wt 250 lb (113.4 kg)   SpO2 97%   Breastfeeding No   BMI 35.87 kg/m²     Results for orders placed or performed in visit on 06/14/21   POCT Urinalysis no Micro   Result Value Ref Range    Color, UA yellow     Clarity, UA clear     Glucose, UA POC neg     Bilirubin, UA neg     Ketones, UA neg     Spec Grav, UA 1.020     Blood, UA POC neg     pH, UA 7.5     Protein, UA POC neg     Urobilinogen, UA 0.2     Leukocytes, UA neg     Nitrite, UA neg      Assessment:       Diagnosis Orders   1. Acute cystitis with hematuria  cephALEXin (KEFLEX) 500 MG capsule   2. UTI symptoms  POCT Urinalysis no Micro    Culture, Urine     Plan: Will treat for UTI at this time based on the symptom presentation. Specimen sent for a culture. Possible treatment alteration based on the results. Patient instructed to complete entire antibiotic course. Increase fluid intake. Educational materials regarding UTI given on AVS.  Patient agreeable to treatment plan. Follow up if symptoms do not improve/worsen. Orders Placed This Encounter   Medications    cephALEXin (KEFLEX) 500 MG capsule     Sig: Take 1 capsule by mouth 2 times daily for 7 days     Dispense:  14 capsule     Refill:  0        Patient given educational materials - see patient instructions. Discussed use, benefit, and side effects of prescribed medications. All patientquestions answered. Pt voiced understanding.     Electronically signed by URIEL Wallace CNP on 6/15/2021at 8:30 AM

## 2021-06-14 NOTE — PATIENT INSTRUCTIONS
Patient Education        Urinary Tract Infection (UTI) in Women: Care Instructions  Overview     A urinary tract infection, or UTI, is a general term for an infection anywhere between the kidneys and the urethra (where urine comes out). Most UTIs are bladder infections. They often cause pain or burning when you urinate. UTIs are caused by bacteria and can be cured with antibiotics. Be sure to complete your treatment so that the infection does not get worse. Follow-up care is a key part of your treatment and safety. Be sure to make and go to all appointments, and call your doctor if you are having problems. It's also a good idea to know your test results and keep a list of the medicines you take. How can you care for yourself at home? · Take your antibiotics as directed. Do not stop taking them just because you feel better. You need to take the full course of antibiotics. · Drink extra water and other fluids for the next day or two. This will help make the urine less concentrated and help wash out the bacteria that are causing the infection. (If you have kidney, heart, or liver disease and have to limit fluids, talk with your doctor before you increase the amount of fluids you drink.)  · Avoid drinks that are carbonated or have caffeine. They can irritate the bladder. · Urinate often. Try to empty your bladder each time. · To relieve pain, take a hot bath or lay a heating pad set on low over your lower belly or genital area. Never go to sleep with a heating pad in place. To prevent UTIs  · Drink plenty of water each day. This helps you urinate often, which clears bacteria from your system. (If you have kidney, heart, or liver disease and have to limit fluids, talk with your doctor before you increase the amount of fluids you drink.)  · Urinate when you need to. · If you are sexually active, urinate right after you have sex. · Change sanitary pads often.   · Avoid douches, bubble baths, feminine hygiene sprays, and other feminine hygiene products that have deodorants. · After going to the bathroom, wipe from front to back. When should you call for help? Call your doctor now or seek immediate medical care if:    · Symptoms such as fever, chills, nausea, or vomiting get worse or appear for the first time.     · You have new pain in your back just below your rib cage. This is called flank pain.     · There is new blood or pus in your urine.     · You have any problems with your antibiotic medicine. Watch closely for changes in your health, and be sure to contact your doctor if:    · You are not getting better after taking an antibiotic for 2 days.     · Your symptoms go away but then come back. Where can you learn more? Go to https://Pinguo.iMusica. org and sign in to your Philo account. Enter V506 in the [facility] box to learn more about \"Urinary Tract Infection (UTI) in Women: Care Instructions. \"     If you do not have an account, please click on the \"Sign Up Now\" link. Current as of: June 29, 2020               Content Version: 12.8  © 3871-3328 [employer]. Care instructions adapted under license by [facility] ([facility]). If you have questions about a medical condition or this instruction, always ask your healthcare professional. [address] any warranty or liability for your use of this information. Patient Education        Learning About the Low FODMAP Diet for Irritable Bowel Syndrome (IBS)  What is the low-FODMAP diet? A low-FODMAP diet is a way to find out what foods give you digestion problems. You stop eating certain high-FODMAP foods for about 2 months. Then you add them back to see how your body reacts. This is called a \"challenge diet. \" A dietitian or doctor can help you follow this diet. FODMAPs are carbohydrates. They are in many types of foods. FODMAP stands for:  · F ermentable. · O ligosaccharides.   · D isaccharides. · M onosaccharides. · A nd p olyols. If you have digestive problems, some of these foods can make your symptoms worse. When you are on this diet, you can still eat certain fruits and vegetables. You can also eat certain grains, meats, fish, and lactose-free milks. What is it used for? If you have irritable bowel syndrome (IBS), you can ease your symptoms by not eating some types of foods. Some people also use this diet for inflammatory bowel disease (IBD) or some food intolerances. High-FODMAP foods can be hard to digest. They pull more fluid into your intestines. They are also easily fermented. This can lead to bloating, belly pain, gas, and diarrhea. The low-FODMAP diet can help you figure out what foods to avoid. And it can help you find foods that are easier to digest.  This diet can help with symptoms of some digestive diseases. But it's not a cure. You will still need to manage your condition. How does it work? You will work with a doctor or dietitian when you start the diet. At first, you won't eat any high-FODMAP foods for a few weeks. Go to www.Sonopia. Bycler to learn more about this diet. Vaishali Gonzalez also find links to an casper for your phone or other device. You'll find low-FODMAP cookbooks there too. After 6 to 8 weeks, you will start to try high-FODMAP foods again. You will add those foods back to your diet, one group at a time. Your doctor or dietitian will probably have you wait a few days before you add each new group of those foods. Keep a food diary. You can write down the foods you try and note how they make you feel. After a few weeks, you may have a better idea of what foods you should avoid and what foods make you feel your best.  What are the risks? There is some risk of not getting all of the vitamins and nutrients you need on the low-FODMAP diet. These include:  · Folate. · Thiamin. · Vitamin B6.  · Calcium. · Vitamin D.   Your dietitian or doctor can help you find other sources of these if needed. This diet may limit your fiber intake. Try to plan your meals to include other sources of fiber. What foods are on the low-FODMAP diet? Here is a guide to foods that you can eat, plus the foods that you should avoid, when you are on the low-FODMAP diet. Grains  Okay to eat: Foods made from grains like arrowroot, buckwheat, corn, millet, and oats. You can also eat potato, quinoa, rice, sorghum, tapioca, and teff. Cereals, pasta, breads, corn tortillas and baked goods made from these grains are also okay. (These grains may be labeled \"gluten-free. \")  Avoid: Grains like wheat, barley, and rye. Avoid ingredients such as bulgur, couscous, durum, and semolina. And avoid cereals, breads, and pastas made from these grains. Avoid chickpea, lentil, and pea flour. Proteins  Okay to eat: Most meat, fish, and eggs without high-FODMAP sauces. You can have small amounts of almonds or hazelnuts (10 nuts). Macadamia nuts, peanuts, pecans, pine nuts, and walnuts are also okay. You can also eat shagufta and pumpkin seeds, tofu, and tempeh. Avoid: Beans, chickpeas, lentils, and soybeans. Avoid pistachio and cashew nuts. And some sausages may have high-FODMAP ingredients. Dairy  Okay to eat: Lactose-free dairy milks. Rice milk and almond milk are okay. So are lactose-free yogurts, kefirs, ice creams, and sorbet from low-FODMAP fruits and sweeteners. (These are often labeled \"lactose-free. \") You can have small amounts (2 Tbsp) of cottage, cream, or ricotta cheese. Hard cheeses like cheddar, Hoskins, ROSS, and Swiss are okay. So are small amounts (1 oz) of aged or ripened cheeses like Brie, blue, and feta. Avoid: Milk, including cow, goat, and sheep. Avoid condensed or evaporated milk, buttermilk, custard, cream, sour cream, yogurt, and ice cream. Avoid soy milk. (Check sauces for dairy ingredients.)  Vegetables  Okay to eat:  Bamboo shoots, bell peppers, bok ashley, up to ½ cup of broccoli or cabbage (red or white), and cucumbers. Eggplant, green beans, lettuce, olives, parsnips, and potatoes are okay to eat. So are pumpkin, rutabaga, seaweed, sprouts, Swiss chard, and spinach. You can eat scallions (green part only) and squash (not butternut). You can eat tomatoes, turnips, watercress, yams, and zucchini. You can also have small amounts of artichoke hearts (from can, 1 oz), carrots, corn (½ cob), and sweet potato (½ cup). Avoid: Artichokes, asparagus, [place] sprouts, kevin cabbage, cauliflower, and celery. And avoid garlic, leeks, mushrooms, okra, onions, scallions (white part), shallots, and peas. Fruits  Okay to eat: Bananas, blueberries, cantaloupe, coconut, grapes, and honeydew. Kiwi, shirley, limes, oranges, passion fruit, papaya, and pineapple are also okay. You can eat plantain, raspberries, rhubarb, star fruit, strawberries, tangelo, and tangerine. You can also have small amounts of dried banana chips (up to 10 chips), dried cranberries (1 Tbsp), and shredded coconut (up to ¼ cup). Avoid: Apples, applesauce, apricots, avocados, blackberries, boysenberries, and cherries. Also avoid dates, figs, grapefruit, guava, lychee, and mangoes. Don't eat nectarines, peaches, pears, persimmon, plums, prunes, tamarillo, or watermelon. And limit most canned and dried fruits. Oils, spices, condiments, and sweeteners  Okay to eat: Vegetable oils (including garlic infused), butter, ghee, lard, and margarine (no trans fat). You can have most fresh herbs like basil, chives, coriander, payam, parsley, rosemary and thyme. You can have salt, jams made from low-FODMAP fruits, mayonnaise, and mustard. Soy sauce, hot sauce (no garlic), tamari, and vinegar are also okay. Sweeteners that are okay include sugar (sucrose), powdered (confectioner's) sugar, brown sugar, glucose, and maple syrup. You can also have some artificial sweeteners like aspartame, saccharine, and stevia.   Avoid: Chutneys, hummus, jellies, garlic sauces, and gravies made with onion or garlic. Avoid pickles, relish, some salad dressings and soup stocks, salsa, and tomato paste. And avoid sauces and other foods with high fructose corn syrup, honey, molasses, and agave. Avoid artificial sweeteners (isomalt, mannitol, malitol, sorbitol, and xylitol). Avoid corn syrup solids, fructose, fruit juice concentrate, and polydextrose. Other foods and drinks  Okay to have: Water, soda water, tonic, soft drinks sweetened with sugar, ½ cup of low-FODMAP fruit juice, and most teas and alcohols. You can also eat foods made with baking powder and soda, cocoa, and gelatin. Avoid: Juices from high-FODMAP fruits and vegetables. And avoid fortified jaycob, chamomile and fennel teas, chicory-based drinks and coffee substitutes, and bouillon cubes. Follow-up care is a key part of your treatment and safety. Be sure to make and go to all appointments, and call your doctor if you are having problems. It's also a good idea to know your test results and keep a list of the medicines you take. Where can you learn more? Go to https://Mercaripepiceweb.[email or web address].com. org and sign in to your Chubbies Shorts account. Enter L235 in the [facility] box to learn more about \"Learning About the Low FODMAP Diet for Irritable Bowel Syndrome (IBS). \"     If you do not have an account, please click on the \"Sign Up Now\" link. Current as of: December 17, 2020               Content Version: 12.8  © 7872-3793 Healthwise, Incorporated. Care instructions adapted under license by [facility] ([facility]). If you have questions about a medical condition or this instruction, always ask your healthcare professional. [place] [address] any warranty or liability for your use of this information.

## 2021-06-14 NOTE — PROGRESS NOTES
[facility] [id number] [facility] [place] [facility]  [address] [address]  Dept: [phone]  Dept Fax: [phone]     Irma Chang is a 24 y.o. female who presents to the urgent care today for her medicalconditions/complaints as noted below. Irma Chang is c/o of Urinary Tract Infection (pt has been having flank pain burnning and frequency)    HPI:      HPI    Past Medical History:   Diagnosis Date    Allergic     Anxiety     Asthma     Cyst of ovary, right     Eczema     Headache     Hypoglycemia     Neurocardiogenic syncope     Pneumonia     Reflux esophagitis     Seizures (HCC)       Current Outpatient Medications   Medication Sig Dispense Refill    Plecanatide 3 MG TABS Take 3 mg by mouth daily      vitamin D3 (CHOLECALCIFEROL) 25 MCG (1000 UT) TABS tablet Take 1 tablet by mouth daily 30 tablet 2    dicyclomine (BENTYL) 10 MG capsule Take 1 capsule by mouth 4 times daily (before meals and nightly) 120 capsule 0    LOW-OGESTREL 0.3-30 MG-MCG per tablet TAKE ONE TABLET BY MOUTH DAILY 28 tablet 5    dicyclomine (BENTYL) 20 MG tablet TAKE ONE TABLET BY MOUTH EVERY 6 TO 8 HOURS AS NEEDED FOR ABDOMINAL PAIN 60 tablet 0    omeprazole (PRILOSEC) 20 MG delayed release capsule TAKE ONE CAPSULE BY MOUTH TWICE A DAY 60 capsule 3    sertraline (ZOLOFT) 100 MG tablet TAKE TWO TABLETS BY MOUTH DAILY 60 tablet 4    hydrOXYzine (ATARAX) 50 MG tablet Take 1 tablet by mouth every 8 hours as needed for Anxiety 90 tablet 5    fluticasone (FLONASE) 50 MCG/ACT nasal spray SPRAY TWO SPRAYS IN EACH NOSTRIL ONCE DAILY (Patient taking differently: using PRN) 1 Bottle 5    triamcinolone (KENALOG) 0.025 % cream Apply topically 2 times daily.  (Patient taking differently: Apply topically 2 times daily. /Using PRN) 80 g 5    albuterol sulfate HFA (VENTOLIN HFA) 108 (90 Base) MCG/ACT inhaler Inhale 2 puffs into the lungs every 6 hours as needed for Wheezing 1 Inhaler 2     No current facility-administered medications for this visit. No Known Allergies    Subjective:      Review of Systems    Objective:      Physical Exam  /84 (Site: Left Upper Arm, Position: Sitting, Cuff Size: Large Adult)   Pulse 100   Temp 97.6 °F (36.4 °C) (Tympanic)   Ht 5' 10\" (1.778 m)   Wt 250 lb (113.4 kg)   SpO2 97%   Breastfeeding No   BMI 35.87 kg/m²     Assessment:       Diagnosis Orders   1. UTI symptoms  POCT Urinalysis no Micro    Culture, Urine       Plan:      No follow-ups on file. No orders of the defined types were placed in this encounter. Patient given educational materials - see patient instructions. Discussed use, benefit, and side effects of prescribed medications. All patientquestions answered. Pt voiced understanding.     Electronically signed by URIEL Nielsen CNP on 6/14/2021at 7:43 PM

## 2021-06-15 LAB
CULTURE: NORMAL
Lab: NORMAL
SPECIMEN DESCRIPTION: NORMAL

## 2021-06-15 ASSESSMENT — ENCOUNTER SYMPTOMS
ABDOMINAL PAIN: 0
CONSTIPATION: 1
WHEEZING: 0
DIARRHEA: 1
COUGH: 0
CHEST TIGHTNESS: 0
NAUSEA: 0
RESPIRATORY NEGATIVE: 1
SHORTNESS OF BREATH: 0
VOMITING: 0

## 2021-08-04 ENCOUNTER — HOSPITAL ENCOUNTER (OUTPATIENT)
Age: 21
Setting detail: SPECIMEN
Discharge: HOME | End: 2021-08-04
Payer: COMMERCIAL

## 2021-08-04 ENCOUNTER — OFFICE VISIT (OUTPATIENT)
Dept: PRIMARY CARE CLINIC | Age: 21
End: 2021-08-04
Payer: COMMERCIAL

## 2021-08-04 VITALS
SYSTOLIC BLOOD PRESSURE: 127 MMHG | BODY MASS INDEX: 35.79 KG/M2 | TEMPERATURE: 98.1 F | WEIGHT: 250 LBS | HEIGHT: 70 IN | DIASTOLIC BLOOD PRESSURE: 83 MMHG

## 2021-08-04 DIAGNOSIS — J02.9 SORE THROAT: Primary | ICD-10-CM

## 2021-08-04 DIAGNOSIS — Z20.822 EXPOSURE TO COVID-19 VIRUS: ICD-10-CM

## 2021-08-04 DIAGNOSIS — J02.0 STREPTOCOCCAL PHARYNGITIS: ICD-10-CM

## 2021-08-04 LAB — S PYO AG THROAT QL: POSITIVE

## 2021-08-04 PROCEDURE — 87880 STREP A ASSAY W/OPTIC: CPT

## 2021-08-04 PROCEDURE — 99214 OFFICE O/P EST MOD 30 MIN: CPT

## 2021-08-04 RX ORDER — AMOXICILLIN 500 MG/1
500 CAPSULE ORAL 2 TIMES DAILY
Qty: 20 CAPSULE | Refills: 0 | Status: SHIPPED | OUTPATIENT
Start: 2021-08-04 | End: 2021-08-14

## 2021-08-04 ASSESSMENT — ENCOUNTER SYMPTOMS
ABDOMINAL PAIN: 1
RHINORRHEA: 0
NAUSEA: 1
SORE THROAT: 1
VOMITING: 0
SWOLLEN GLANDS: 0
DIARRHEA: 1
COUGH: 0

## 2021-08-04 NOTE — PROGRESS NOTES
[facility] [place] [facility] [id number] [facility]  [address] [place] [address]  Dept: Lindsay Martins is a 24 y.o. female Established patient, who presents to the walk-in clinic today with conditions/complaints as noted below:    Chief Complaint   Patient presents with    Otalgia     ear pain/fullness, hearing seems distatnt, congestion, headaches x 5 days          HPI:     Otalgia   There is pain in both ears. This is a new problem. The current episode started in the past 7 days. The problem occurs constantly. The problem has been unchanged. There has been no fever. The pain is at a severity of 4/10. The pain is mild. Associated symptoms include abdominal pain, diarrhea and a sore throat. Pertinent negatives include no coughing, ear discharge, headaches, rhinorrhea or vomiting. She has tried nothing for the symptoms. Nausea & Vomiting  This is a new problem. The current episode started in the past 7 days. The problem occurs constantly. The problem has been gradually worsening. Associated symptoms include abdominal pain, anorexia, congestion, fatigue, nausea and a sore throat. Pertinent negatives include no chest pain, chills, coughing, fever, headaches, swollen glands or vomiting. Pharyngitis  This is a new problem. The current episode started in the past 7 days. The problem occurs constantly. The problem has been unchanged. Associated symptoms include abdominal pain, anorexia, congestion, fatigue, nausea and a sore throat. Pertinent negatives include no chest pain, chills, coughing, fever, headaches, swollen glands or vomiting. Nothing aggravates the symptoms. She has tried nothing for the symptoms.        Past Medical History:   Diagnosis Date    Allergic     Anxiety     Asthma     Cyst of ovary, right     Eczema     Headache     Hypoglycemia     Neurocardiogenic syncope     Pneumonia     Reflux esophagitis     Seizures (HCC)        Current Outpatient Medications   Medication Sig Dispense Refill    amoxicillin (AMOXIL) 500 MG capsule Take 1 capsule by mouth 2 times daily for 10 days 20 capsule 0    Plecanatide 3 MG TABS Take 3 mg by mouth daily      vitamin D3 (CHOLECALCIFEROL) 25 MCG (1000 UT) TABS tablet Take 1 tablet by mouth daily 30 tablet 2    dicyclomine (BENTYL) 10 MG capsule Take 1 capsule by mouth 4 times daily (before meals and nightly) 120 capsule 0    LOW-OGESTREL 0.3-30 MG-MCG per tablet TAKE ONE TABLET BY MOUTH DAILY 28 tablet 5    dicyclomine (BENTYL) 20 MG tablet TAKE ONE TABLET BY MOUTH EVERY 6 TO 8 HOURS AS NEEDED FOR ABDOMINAL PAIN 60 tablet 0    omeprazole (PRILOSEC) 20 MG delayed release capsule TAKE ONE CAPSULE BY MOUTH TWICE A DAY 60 capsule 3    sertraline (ZOLOFT) 100 MG tablet TAKE TWO TABLETS BY MOUTH DAILY 60 tablet 4    hydrOXYzine (ATARAX) 50 MG tablet Take 1 tablet by mouth every 8 hours as needed for Anxiety 90 tablet 5    albuterol sulfate HFA (VENTOLIN HFA) 108 (90 Base) MCG/ACT inhaler Inhale 2 puffs into the lungs every 6 hours as needed for Wheezing 1 Inhaler 2    fluticasone (FLONASE) 50 MCG/ACT nasal spray SPRAY TWO SPRAYS IN EACH NOSTRIL ONCE DAILY (Patient not taking: Reported on 8/4/2021) 1 Bottle 5    triamcinolone (KENALOG) 0.025 % cream Apply topically 2 times daily. (Patient taking differently: Apply topically 2 times daily. /Using PRN) 80 g 5     No current facility-administered medications for this visit. No Known Allergies    Review of Systems:     Review of Systems   Constitutional: Positive for fatigue. Negative for chills and fever. HENT: Positive for congestion, ear pain and sore throat. Negative for ear discharge and rhinorrhea. Respiratory: Negative for cough. Cardiovascular: Negative for chest pain. Gastrointestinal: Positive for abdominal pain, anorexia, diarrhea and nausea.  Negative for

## 2021-08-04 NOTE — PATIENT INSTRUCTIONS
Patient Education        Strep Throat: Care Instructions  Your Care Instructions     Strep throat is a bacterial infection that causes sudden, severe sore throat and fever. Strep throat, which is caused by bacteria called streptococcus, is treated with antibiotics. Sometimes a strep test is necessary to tell if the sore throat is caused by strep bacteria. Treatment can help ease symptoms and may prevent future problems. Follow-up care is a key part of your treatment and safety. Be sure to make and go to all appointments, and call your doctor if you are having problems. It's also a good idea to know your test results and keep a list of the medicines you take. How can you care for yourself at home? · Take your antibiotics as directed. Do not stop taking them just because you feel better. You need to take the full course of antibiotics. · Strep throat can spread to others until 24 hours after you begin taking antibiotics. During this time, avoid contact with other people at work, school, or home, especially infants and children. Do not sneeze or cough on others, and wash your hands often. Keep your drinking glass and eating utensils separate from those of others. Wash these items well in hot, soapy water. · Gargle with warm salt water at least once each hour to help reduce swelling and make your throat feel better. Use 1 teaspoon of salt mixed in 8 fluid ounces of warm water. · Take an over-the-counter pain medication, such as acetaminophen (Tylenol), ibuprofen (Advil, Motrin), or naproxen (Aleve). Read and follow all instructions on the label. · Try an over-the-counter anesthetic throat spray or throat lozenges, which may help relieve throat pain. · Drink plenty of fluids. Fluids may help soothe an irritated throat. Hot fluids, such as tea or soup, may help your throat feel better. · Eat soft solids and drink plenty of clear liquids.  Flavored ice pops, ice cream, scrambled eggs, sherbet, and gelatin dessert vomiting are stomach flu and food poisoning. Nausea and vomiting from viral stomach flu will usually start to improve within 24 hours. Nausea and vomiting from food poisoning may last from 12 to 48 hours. The doctor has checked you carefully, but problems can develop later. If you notice any problems or new symptoms, get medical treatment right away. Follow-up care is a key part of your treatment and safety. Be sure to make and go to all appointments, and call your doctor if you are having problems. It's also a good idea to know your test results and keep a list of the medicines you take. How can you care for yourself at home? · To prevent dehydration, drink plenty of fluids. Choose water and other caffeine-free clear liquids until you feel better. If you have kidney, heart, or liver disease and have to limit fluids, talk with your doctor before you increase the amount of fluids you drink. · Rest in bed until you feel better. · When you are able to eat, try clear soups, mild foods, and liquids until all symptoms are gone for 12 to 48 hours. Other good choices include dry toast, crackers, cooked cereal, and gelatin dessert, such as Jell-O. When should you call for help? Call 911 anytime you think you may need emergency care. For example, call if:    · You passed out (lost consciousness). Call your doctor now or seek immediate medical care if:    · You have symptoms of dehydration, such as:  ? Dry eyes and a dry mouth. ? Passing only a little urine. ? Feeling thirstier than usual.     · You have new or worsening belly pain.     · You have a new or higher fever.     · You vomit blood or what looks like coffee grounds. Watch closely for changes in your health, and be sure to contact your doctor if:    · You have ongoing nausea and vomiting.     · Your vomiting is getting worse.     · Your vomiting lasts longer than 2 days.     · You are not getting better as expected. Where can you learn more?   Go to [email or web address] org and sign in to your [employer] account. Enter [id number] in the [facility] box to learn more about \"Nausea and Vomiting: Care Instructions. \"     If you do not have an account, please click on the \"Sign Up Now\" link. Current as of: October 19, 2020               Content Version: 12.9  © 2006-2021 [employer]. Care instructions adapted under license by [facility] ([facility]). If you have questions about a medical condition or this instruction, always ask your healthcare professional. [place] [address] any warranty or liability for your use of this information. Patient Education        Sore Throat: Care Instructions  Your Care Instructions     Infection by bacteria or a virus causes most sore throats. Cigarette smoke, dry air, air pollution, allergies, and yelling can also cause a sore throat. Sore throats can be painful and annoying. Fortunately, most sore throats go away on their own. If you have a bacterial infection, your doctor may prescribe antibiotics. Follow-up care is a key part of your treatment and safety. Be sure to make and go to all appointments, and call your doctor if you are having problems. It's also a good idea to know your test results and keep a list of the medicines you take. How can you care for yourself at home? · If your doctor prescribed antibiotics, take them as directed. Do not stop taking them just because you feel better. You need to take the full course of antibiotics. · Gargle with warm salt water once an hour to help reduce swelling and relieve discomfort. Use 1 teaspoon of salt mixed in 1 cup of warm water. · Take an over-the-counter pain medicine, such as acetaminophen (Tylenol), ibuprofen (Advil, Motrin), or naproxen (Aleve). Read and follow all instructions on the label. · Be careful when taking over-the-counter cold or flu medicines and Tylenol at the same time.  Many of these medicines have acetaminophen, which is Tylenol. Read the labels to make sure that you are not taking more than the recommended dose. Too much acetaminophen (Tylenol) can be harmful. · Drink plenty of fluids. Fluids may help soothe an irritated throat. Hot fluids, such as tea or soup, may help decrease throat pain. · Use over-the-counter throat lozenges to soothe pain. Regular cough drops or hard candy may also help. These should not be given to young children because of the risk of choking. · Do not smoke or allow others to smoke around you. If you need help quitting, talk to your doctor about stop-smoking programs and medicines. These can increase your chances of quitting for good. · Use a vaporizer or humidifier to add moisture to your bedroom. Follow the directions for cleaning the machine. When should you call for help? Call your doctor now or seek immediate medical care if:    · You have new or worse trouble swallowing.     · Your sore throat gets much worse on one side. Watch closely for changes in your health, and be sure to contact your doctor if you do not get better as expected. Where can you learn more? Go to https://"[employer]"peThe Social Radioeb.[employer]. org and sign in to your [employer] account. Enter Z931 in the [employer] box to learn more about \"Sore Throat: Care Instructions. \"     If you do not have an account, please click on the \"Sign Up Now\" link. Current as of: December 2, 2020               Content Version: 12.9  © 2006-2021 Healthwise, Incorporated. Care instructions adapted under license by [facility] ([facility]). If you have questions about a medical condition or this instruction, always ask your healthcare professional. [place] [address] any warranty or liability for your use of this information.

## 2021-08-04 NOTE — LETTER
[address] [address]  Phone: [phone]  Fax: [phone]    URIEL Hernandez CNP        August 4, 2021     Patient: Arlene Hernandez   YOB: 2000   Date of Visit: 8/4/2021       To Whom It May Concern: It is my medical opinion that Arlene Hernandez should remain out of work until August 5th, 2021 due to medical illness. She may return on August 6th 2021    If you have any questions or concerns, please don't hesitate to call.     Sincerely,        URIEL Hernandez CNP

## 2021-08-05 LAB
SARS-COV-2: NORMAL
SARS-COV-2: NOT DETECTED
SOURCE: NORMAL

## 2021-09-13 ENCOUNTER — HOSPITAL ENCOUNTER (OUTPATIENT)
Facility: MEDICAL CENTER | Age: 21
End: 2021-09-13
Payer: COMMERCIAL

## 2021-10-07 ENCOUNTER — HOSPITAL ENCOUNTER (OUTPATIENT)
Age: 21
Setting detail: SPECIMEN
Discharge: HOME | End: 2021-10-07
Payer: COMMERCIAL

## 2021-10-07 ENCOUNTER — OFFICE VISIT (OUTPATIENT)
Dept: PRIMARY CARE CLINIC | Age: 21
End: 2021-10-07
Payer: COMMERCIAL

## 2021-10-07 VITALS
DIASTOLIC BLOOD PRESSURE: 81 MMHG | SYSTOLIC BLOOD PRESSURE: 118 MMHG | HEART RATE: 69 BPM | BODY MASS INDEX: 32.93 KG/M2 | HEIGHT: 70 IN | WEIGHT: 230 LBS | TEMPERATURE: 97.3 F

## 2021-10-07 DIAGNOSIS — B00.89 HERPETIC WHITLOW: ICD-10-CM

## 2021-10-07 DIAGNOSIS — B96.89 ACUTE BACTERIAL SINUSITIS: Primary | ICD-10-CM

## 2021-10-07 DIAGNOSIS — J01.90 ACUTE BACTERIAL SINUSITIS: Primary | ICD-10-CM

## 2021-10-07 PROCEDURE — 99213 OFFICE O/P EST LOW 20 MIN: CPT | Performed by: NURSE PRACTITIONER

## 2021-10-07 RX ORDER — AZELASTINE 1 MG/ML
2 SPRAY, METERED NASAL 2 TIMES DAILY
Qty: 60 ML | Refills: 0 | Status: SHIPPED | OUTPATIENT
Start: 2021-10-07

## 2021-10-07 RX ORDER — AZITHROMYCIN 250 MG/1
TABLET, FILM COATED ORAL
Qty: 1 PACKET | Refills: 0 | Status: SHIPPED | OUTPATIENT
Start: 2021-10-07 | End: 2021-11-15

## 2021-10-07 ASSESSMENT — ENCOUNTER SYMPTOMS
EYE DISCHARGE: 0
SINUS PRESSURE: 0
CONSTIPATION: 0
NAUSEA: 1
DIARRHEA: 0
COUGH: 1
SORE THROAT: 1
WHEEZING: 0
VOICE CHANGE: 0
SHORTNESS OF BREATH: 0
CHEST TIGHTNESS: 0
EYE REDNESS: 0
VOMITING: 0

## 2021-10-07 NOTE — PROGRESS NOTES
[facility]  [address] [address]  Dept: [phone]  Dept Fax: [phone]     Tarun Maier is a 22 y.o. female who presents to the urgent care today for her medicalconditions/complaints as noted below. Tarun Maier is c/o of Cough (pt is here for cough, congestion, sore throat, nausea x 5 days ), Rash (rash on finger ), and Other (pt needs a work note )    HPI:      Pharyngitis  This is a new problem. The current episode started in the past 7 days. The problem has been unchanged. Associated symptoms include congestion, coughing, a fever (at home), nausea, a rash (left index finger) and a sore throat. Pertinent negatives include no chest pain, chills, fatigue, headaches, myalgias, vomiting or weakness. The symptoms are aggravated by drinking, eating and swallowing. Treatments tried: otc tx. The treatment provided no relief.      Past Medical History:   Diagnosis Date    Allergic     Anxiety     Asthma     Cyst of ovary, right     Eczema     Headache     Hypoglycemia     Neurocardiogenic syncope     Pneumonia     Reflux esophagitis     Seizures (HCC)       Current Outpatient Medications   Medication Sig Dispense Refill    azithromycin (ZITHROMAX Z-JEANNIE) 250 MG tablet Take 2 tabs on day 1 followed by 1 tab on days 2-5. 1 packet 0    azelastine (ASTELIN) 0.1 % nasal spray 2 sprays by Nasal route 2 times daily Use in each nostril as directed 60 mL 0    Plecanatide 3 MG TABS Take 3 mg by mouth daily      vitamin D3 (CHOLECALCIFEROL) 25 MCG (1000 UT) TABS tablet Take 1 tablet by mouth daily 30 tablet 2    dicyclomine (BENTYL) 10 MG capsule Take 1 capsule by mouth 4 times daily (before meals and nightly) 120 capsule 0    LOW-OGESTREL 0.3-30 MG-MCG per tablet TAKE ONE TABLET BY MOUTH DAILY 28 tablet 5    dicyclomine (BENTYL) 20 MG tablet TAKE ONE TABLET BY MOUTH EVERY 6 TO 8 HOURS AS NEEDED FOR ABDOMINAL PAIN 60 tablet 0    omeprazole (PRILOSEC) 20 MG delayed release capsule TAKE ONE CAPSULE BY MOUTH TWICE A DAY 60 capsule 3    sertraline (ZOLOFT) 100 MG tablet TAKE TWO TABLETS BY MOUTH DAILY 60 tablet 4    hydrOXYzine (ATARAX) 50 MG tablet Take 1 tablet by mouth every 8 hours as needed for Anxiety 90 tablet 5    fluticasone (FLONASE) 50 MCG/ACT nasal spray SPRAY TWO SPRAYS IN EACH NOSTRIL ONCE DAILY (Patient not taking: Reported on 8/4/2021) 1 Bottle 5    triamcinolone (KENALOG) 0.025 % cream Apply topically 2 times daily. (Patient taking differently: Apply topically 2 times daily. /Using PRN) 80 g 5    albuterol sulfate HFA (VENTOLIN HFA) 108 (90 Base) MCG/ACT inhaler Inhale 2 puffs into the lungs every 6 hours as needed for Wheezing 1 Inhaler 2     No current facility-administered medications for this visit. No Known Allergies    Reviewed PMH, SH, and FH with the patient and updated. Subjective:      Review of Systems   Constitutional: Positive for fever (at home). Negative for chills and fatigue. HENT: Positive for congestion, ear pain (pressure) and sore throat. Negative for ear discharge, postnasal drip, sinus pressure, sneezing and voice change. Eyes: Negative for discharge and redness. Respiratory: Positive for cough. Negative for chest tightness, shortness of breath and wheezing. Cardiovascular: Negative. Negative for chest pain. Gastrointestinal: Positive for nausea. Negative for constipation, diarrhea and vomiting. Musculoskeletal: Negative for myalgias. Skin: Positive for rash (left index finger). Neurological: Negative for dizziness, weakness, light-headedness and headaches. Hematological: Negative for adenopathy. All other systems reviewed and are negative. Objective:      Physical Exam  Vitals and nursing note reviewed. Constitutional:       General: She is not in acute distress. Appearance: Normal appearance. She is well-developed.  She is not ill-appearing, toxic-appearing or diaphoretic. HENT:      Head: Normocephalic. Right Ear: Tympanic membrane and external ear normal.      Left Ear: Tympanic membrane and external ear normal.      Nose: Nose normal.      Right Sinus: No maxillary sinus tenderness or frontal sinus tenderness. Left Sinus: No maxillary sinus tenderness or frontal sinus tenderness. Mouth/Throat:      Pharynx: Oropharyngeal exudate (PND) present. No posterior oropharyngeal erythema. Eyes:      General:         Right eye: No discharge. Left eye: No discharge. Cardiovascular:      Rate and Rhythm: Normal rate and regular rhythm. Heart sounds: Normal heart sounds. No murmur heard. Pulmonary:      Effort: Pulmonary effort is normal. No respiratory distress. Breath sounds: Normal breath sounds. No wheezing or rales. Lymphadenopathy:      Cervical: No cervical adenopathy. Skin:     General: Skin is warm. Findings: Rash present. Rash is vesicular (noted to the left index finger). Neurological:      Mental Status: She is alert. /81 (Site: Right Upper Arm, Position: Sitting, Cuff Size: Large Adult)   Pulse 69   Temp 97.3 °F (36.3 °C) (Temporal)   Ht 5' 10\" (1.778 m)   Wt 230 lb (104.3 kg)   BMI 33.00 kg/m²     Assessment:       Diagnosis Orders   1. Acute bacterial sinusitis  azithromycin (ZITHROMAX Z-JEANNIE) 250 MG tablet    azelastine (ASTELIN) 0.1 % nasal spray   2. Herpetic modesta       Plan:      Based on the severity of the symptoms-- I will treat this as bacterial at this time. Patient instructed to complete antibiotic prescription fully. Astelin nasal spray BID for nasal congestion. May use Motrin/Tylenol for fever/pain. Saline washes, salt water gargles and over the counter preparations if desired. Patient agreeable to treatment plan. Educational materials provided on AVS.  Follow up if symptoms do not improve/worsen.     Orders Placed This Encounter   Medications    azithromycin (Lynn Rojo)

## 2021-10-07 NOTE — LETTER
[facility]  [address] [address] [address]  Phone: [phone]  Fax: [phone]    URIEL Freire CNP        October 7, 2021     Patient: Fran Jewell   YOB: 2000   Date of Visit: 10/7/2021       To Whom it May Concern:    Fran Jewell was seen in my clinic on 10/7/2021. Please excuse her absence, she is currently awaiting COVID-19 testing results and was advised not to return to work until testing results. This typically takes 24-48 hours. If you have any questions or concerns, please don't hesitate to call.     Sincerely,         URIEL Freire CNP

## 2021-10-07 NOTE — PATIENT INSTRUCTIONS
Patient Education        Herpetic Modesta: Care Instructions  Your Care Instructions     Herpetic modesta is a finger infection. It's usually caused by the herpes virus that causes cold sores. It can spread to a finger from a cold sore in or around your mouth. Leonidas De Los Santos also can be caused by the virus that causes genital herpes. An area of your finger may be red. It may have a small group of blisters. Your finger also may hurt, itch, or tingle. Your finger should get better on its own. This may take a few weeks. But modesta may come back to the same area of your finger. Your doctor may prescribe medicines to help fight the herpes virus. You may be asked to cover your finger with a bandage. This can avoid spreading the infection. Follow-up care is a key part of your treatment and safety. Be sure to make and go to all appointments, and call your doctor if you are having problems. It's also a good idea to know your test results and keep a list of the medicines you take. How can you care for yourself at home? · Be safe with medicines. Take your medicines exactly as prescribed. Call your doctor if you think you are having a problem with your medicine. · Ask your doctor if you can take an over-the-counter pain medicine, such as acetaminophen (Tylenol), ibuprofen (Advil, Motrin), or naproxen (Aleve). · Follow your doctor's advice to care for your finger. If you did not get instructions:  ? Wash the area with clean water 2 times a day. Don't use hydrogen peroxide or alcohol, which can slow healing. ? You may cover the area with a thin layer of petroleum jelly, such as Vaseline, and a nonstick bandage. When should you call for help? Call your doctor now or seek immediate medical care if:    · You have symptoms that the infection is getting worse, such as:  ? Increased pain, swelling, warmth, or redness. ? Red streaks leading from the area. ? Pus draining from the area. ? Fever.    Watch closely for changes in your health, and be sure to contact your doctor if:    · You do not get better as expected. Where can you learn more? Go to https://chpepiceweb.[email or web address] org and sign in to your [employer] account. Enter S575 in the [facility] box to learn more about \"Nicholas Jensen: Care Instructions. \"     If you do not have an account, please click on the \"Sign Up Now\" link. Current as of: July 1, 2021               Content Version: 13.0  © 2887-4748 [employer]. Care instructions adapted under license by [facility] ([facility]). If you have questions about a medical condition or this instruction, always ask your healthcare professional. [address] any warranty or liability for your use of this information. Patient Education        Sinusitis: Care Instructions  Your Care Instructions     Sinusitis is an infection of the lining of the sinus cavities in your head. Sinusitis often follows a cold. It causes pain and pressure in your head and face. In most cases, sinusitis gets better on its own in 1 to 2 weeks. But some mild symptoms may last for several weeks. Sometimes antibiotics are needed. Follow-up care is a key part of your treatment and safety. Be sure to make and go to all appointments, and call your doctor if you are having problems. It's also a good idea to know your test results and keep a list of the medicines you take. How can you care for yourself at home? · Take an over-the-counter pain medicine, such as acetaminophen (Tylenol), ibuprofen (Advil, Motrin), or naproxen (Aleve). Read and follow all instructions on the label. · If the doctor prescribed antibiotics, take them as directed. Do not stop taking them just because you feel better. You need to take the full course of antibiotics. · Be careful when taking over-the-counter cold or flu medicines and Tylenol at the same time. Many of these medicines have acetaminophen, which is Tylenol.  Read the labels to make sure that you are not taking more than the recommended dose. Too much acetaminophen (Tylenol) can be harmful. · Breathe warm, moist air from a steamy shower, a hot bath, or a sink filled with hot water. Avoid cold, dry air. Using a humidifier in your home may help. Follow the directions for cleaning the machine. · Use saline (saltwater) nasal washes. This can help keep your nasal passages open and wash out mucus and bacteria. You can buy saline nose drops at a grocery store or drugstore. Or you can make your own at home by adding 1 teaspoon of salt and 1 teaspoon of baking soda to 2 cups of distilled water. If you make your own, fill a bulb syringe with the solution, insert the tip into your nostril, and squeeze gently. Alpa Chol your nose. · Put a hot, wet towel or a warm gel pack on your face 3 or 4 times a day for 5 to 10 minutes each time. · Try a decongestant nasal spray like oxymetazoline (Afrin). Do not use it for more than 3 days in a row. Using it for more than 3 days can make your congestion worse. When should you call for help? Call your doctor now or seek immediate medical care if:    · You have new or worse swelling or redness in your face or around your eyes.     · You have a new or higher fever. Watch closely for changes in your health, and be sure to contact your doctor if:    · You have new or worse facial pain.     · The mucus from your nose becomes thicker (like pus) or has new blood in it.     · You are not getting better as expected. Where can you learn more? Go to https://[employer]. org and sign in to your [employer] account. Enter Q453 in the [employer] box to learn more about \"Sinusitis: Care Instructions. \"     If you do not have an account, please click on the \"Sign Up Now\" link. Current as of: December 2, 2020               Content Version: 13.0  © 0319-2976 Healthwise, Incorporated. Care instructions adapted under license by [facility] ([facility]).  If

## 2021-10-08 DIAGNOSIS — J01.90 ACUTE BACTERIAL SINUSITIS: ICD-10-CM

## 2021-10-08 DIAGNOSIS — B96.89 ACUTE BACTERIAL SINUSITIS: ICD-10-CM

## 2021-10-09 LAB
SARS-COV-2: NORMAL
SARS-COV-2: NOT DETECTED
SOURCE: NORMAL

## 2021-11-15 ENCOUNTER — OFFICE VISIT (OUTPATIENT)
Dept: FAMILY MEDICINE CLINIC | Age: 21
End: 2021-11-15
Payer: COMMERCIAL

## 2021-11-15 VITALS
SYSTOLIC BLOOD PRESSURE: 118 MMHG | HEART RATE: 100 BPM | TEMPERATURE: 98.7 F | HEIGHT: 70 IN | WEIGHT: 254.6 LBS | BODY MASS INDEX: 36.45 KG/M2 | DIASTOLIC BLOOD PRESSURE: 66 MMHG | OXYGEN SATURATION: 96 %

## 2021-11-15 DIAGNOSIS — K21.9 GASTROESOPHAGEAL REFLUX DISEASE WITHOUT ESOPHAGITIS: ICD-10-CM

## 2021-11-15 DIAGNOSIS — R79.89 LOW SERUM VITAMIN D: ICD-10-CM

## 2021-11-15 DIAGNOSIS — L20.9 ATOPIC DERMATITIS, UNSPECIFIED TYPE: ICD-10-CM

## 2021-11-15 DIAGNOSIS — R11.10 INTERMITTENT VOMITING: ICD-10-CM

## 2021-11-15 DIAGNOSIS — F41.9 ANXIETY: Primary | ICD-10-CM

## 2021-11-15 PROCEDURE — 99213 OFFICE O/P EST LOW 20 MIN: CPT | Performed by: NURSE PRACTITIONER

## 2021-11-15 RX ORDER — SERTRALINE HYDROCHLORIDE 100 MG/1
TABLET, FILM COATED ORAL
Qty: 60 TABLET | Refills: 4 | Status: SHIPPED | OUTPATIENT
Start: 2021-11-15

## 2021-11-15 RX ORDER — OMEPRAZOLE 20 MG/1
CAPSULE, DELAYED RELEASE ORAL
Qty: 60 CAPSULE | Refills: 3 | Status: SHIPPED | OUTPATIENT
Start: 2021-11-15 | End: 2021-11-15

## 2021-11-15 RX ORDER — OMEPRAZOLE 40 MG/1
40 CAPSULE, DELAYED RELEASE ORAL
Qty: 90 CAPSULE | Refills: 1 | Status: SHIPPED | OUTPATIENT
Start: 2021-11-15

## 2021-11-15 RX ORDER — HYDROXYZINE 50 MG/1
50 TABLET, FILM COATED ORAL EVERY 8 HOURS PRN
Qty: 90 TABLET | Refills: 5 | Status: SHIPPED | OUTPATIENT
Start: 2021-11-15

## 2021-11-15 RX ORDER — TRIAMCINOLONE ACETONIDE 5 MG/G
CREAM TOPICAL
Qty: 30 G | Refills: 0 | Status: SHIPPED | OUTPATIENT
Start: 2021-11-15 | End: 2022-01-06

## 2021-11-15 ASSESSMENT — ENCOUNTER SYMPTOMS
SINUS PAIN: 0
DIARRHEA: 0
COUGH: 0
NAUSEA: 0
VOMITING: 0
SORE THROAT: 0
ABDOMINAL PAIN: 0
SHORTNESS OF BREATH: 0

## 2021-11-15 NOTE — PROGRESS NOTES
[address] [facility]  [address] [facility]  [address] [facility] [address]  Dept: [phone]  Dept Fax: [phone]    Manisha Gonsalez is a 22 y.o. female who presents today for her medicalconditions/complaints as noted below. Manisha Gonsalez is c/o of Gastroesophageal Reflux (refill. stopped bentyl,noticed breathing issue while taking it), Mass (rt ring finger,went away then came back,does bother her,itches,looks like it is getting blisters,skin splitting), and Depression      HPI:         22 oh female patient presents with complaints of follow-up for med check. History of GERD, currently treating with omeprazole, has been out, since being out she has noticed a difference in her symptoms. History of anxiety, reports the Zoloft and the index seen are working well, reports her anxiety has not totally resolved but improved. History of rash to the left index finger and the right fourth finger. Has tried a lotion without improvement. Ongoing x2 weeks, blistered skin that is dry and cracked. Past Medical History:   Diagnosis Date    Allergic     Anxiety     Asthma     Cyst of ovary, right     Eczema     Headache     Hypoglycemia     Neurocardiogenic syncope     Pneumonia     Reflux esophagitis     Seizures (HCC)         Current Outpatient Medications   Medication Sig Dispense Refill    sertraline (ZOLOFT) 100 MG tablet TAKE TWO TABLETS BY MOUTH DAILY 60 tablet 4    hydrOXYzine (ATARAX) 50 MG tablet Take 1 tablet by mouth every 8 hours as needed for Anxiety 90 tablet 5    omeprazole (PRILOSEC) 40 MG delayed release capsule Take 1 capsule by mouth every morning (before breakfast) 90 capsule 1    triamcinolone (ARISTOCORT) 0.5 % cream Apply topically 3 times daily.  30 g 0    azelastine (ASTELIN) 0.1 % nasal spray 2 sprays by Nasal route 2 times daily Use in each nostril as directed 60 mL 0    Plecanatide 3 MG TABS Take 3 mg by mouth daily      vitamin D3 (CHOLECALCIFEROL) 25 MCG (1000 UT) TABS tablet Take 1 tablet by mouth daily 30 tablet 2    LOW-OGESTREL 0.3-30 MG-MCG per tablet TAKE ONE TABLET BY MOUTH DAILY 28 tablet 5    albuterol sulfate HFA (VENTOLIN HFA) 108 (90 Base) MCG/ACT inhaler Inhale 2 puffs into the lungs every 6 hours as needed for Wheezing 1 Inhaler 2     No current facility-administered medications for this visit. No Known Allergies    Subjective:      Review of Systems   Constitutional: Negative for chills and fever. HENT: Negative for ear pain, sinus pain and sore throat. Respiratory: Negative for cough and shortness of breath. Cardiovascular: Negative for chest pain and palpitations. Gastrointestinal: Negative for abdominal pain, diarrhea, nausea and vomiting. Skin: Positive for rash. Neurological: Negative for dizziness and headaches. All other systems reviewed and are negative.      :Objective     Physical Exam  Vitals and nursing note reviewed. Constitutional:       General: She is not in acute distress. Appearance: Normal appearance. She is not toxic-appearing. Cardiovascular:      Rate and Rhythm: Normal rate. Pulmonary:      Effort: Pulmonary effort is normal.      Breath sounds: Normal breath sounds. Skin:     General: Skin is warm and dry. Findings: Rash present. Neurological:      General: No focal deficit present. Mental Status: She is alert and oriented to person, place, and time.        /66 (Site: Right Upper Arm, Position: Sitting, Cuff Size: Large Adult)   Pulse 100   Temp 98.7 °F (37.1 °C) (Oral)   Ht 5' 10\" (1.778 m)   Wt 254 lb 9.6 oz (115.5 kg)   LMP 11/01/2021 (Approximate)   SpO2 96%   BMI 36.53 kg/m²     Lab Review   Hospital Outpatient Visit on 10/07/2021   Component Date Value    SARS-CoV-2 10/07/2021          Source 10/07/2021 hide     SARS-CoV-2 10/07/2021 Not Detected    Hospital Outpatient Visit on 08/04/2021   Component Date Value    SARS-CoV-2 08/04/2021          Source 08/04/2021 . NASOPHARYNGEAL SWAB     SARS-CoV-2 08/04/2021 Not Detected    Office Visit on 08/04/2021   Component Date Value    [facility]   Component Date Value    Specimen Description 06/14/2021 . CLEAN CATCH URINE     Special Requests 06/14/2021 NOT REPORTED     Culture 06/14/2021 NO SIGNIFICANT GROWTH    Office Visit on 06/14/2021   Component Date Value    Color, UA 06/14/2021 yellow     Clarity, UA 06/14/2021 clear     Glucose, UA POC 06/14/2021 neg     Bilirubin, UA 06/14/2021 neg     Ketones, UA 06/14/2021 neg     Spec Grav, UA 06/14/2021 1.020     Blood, UA POC 06/14/2021 neg     pH, UA 06/14/2021 7.5     Protein, UA POC 06/14/2021 neg     Urobilinogen, UA 06/14/2021 0.2     Leukocytes, UA 06/14/2021 neg     Nitrite, UA 06/14/2021 neg    Orders Only on 06/11/2021   Component Date Value    Vitamin B-12 06/09/2021 219     Sodium 06/09/2021 140     Chloride 06/09/2021 107     Potassium 06/09/2021 3.7     BUN 06/09/2021 9     CREATININE 06/09/2021 0.79     Glucose 06/09/2021 97     AST 06/09/2021 13     ALT 06/09/2021 16     Calcium 06/09/2021 9.3     Total Protein 06/09/2021 7.0     CO2 06/09/2021 22     Albumin 06/09/2021 4.1     Alkaline Phosphatase 06/09/2021 63     Total Bilirubin 06/09/2021 0.5     Anion Gap 06/09/2021 11     Cholesterol, Total 06/09/2021 215*    HDL 06/09/2021 35*    LDL Calculated 06/09/2021 151*    Triglycerides 06/09/2021 147     Chol/HDL Ratio 06/09/2021 6.1*    VLDL 06/09/2021 29     Rheumatoid Factor 06/09/2021 <10     TSH 06/09/2021 2.12     Vit D, 25-Hydroxy 06/09/2021 12*   Office Visit on 05/27/2021   Component Date Value    Strep A Ag 05/27/2021 None Detected        Assessment and Plan      1.  Anxiety  -     sertraline (ZOLOFT) 100 MG tablet; TAKE TWO TABLETS BY MOUTH DAILY, Disp-60 tablet, R-4Normal  -     hydrOXYzine (ATARAX) 50 MG tablet; Take 1 tablet by mouth every 8 hours as needed for Anxiety, Disp-90 tablet, R-5Normal  2. Low serum vitamin D  -     Vitamin D 25 Hydroxy; Future  3. Atopic dermatitis, unspecified type  -     triamcinolone (ARISTOCORT) 0.5 % cream; Apply topically 3 times daily. , Disp-30 g, R-0, Normal  4. Intermittent vomiting  -     omeprazole (PRILOSEC) 40 MG delayed release capsule; Take 1 capsule by mouth every morning (before breakfast), Disp-90 capsule, R-1Normal  5. Gastroesophageal reflux disease without esophagitis  -     omeprazole (PRILOSEC) 40 MG delayed release capsule; Take 1 capsule by mouth every morning (before breakfast), Disp-90 capsule, R-1Normal       Continue omeprazole as prescribed regarding gerd    Continue Zoloft, hydroxyzine as prescribed regarding anxiety    Symptoms consistent with atopic dermatitis we will try higher potency topical steroid to the hands    Recent labs reviewed vitamin D was low, recommend recheck to ensure adequate replacement     Med check 6 months, sooner prn    No results found for this visit on 11/15/21. Return in about 6 months (around 5/15/2022), or if symptoms worsen or fail to improve. Orders Placed This Encounter   Medications    sertraline (ZOLOFT) 100 MG tablet     Sig: TAKE TWO TABLETS BY MOUTH DAILY     Dispense:  60 tablet     Refill:  4    hydrOXYzine (ATARAX) 50 MG tablet     Sig: Take 1 tablet by mouth every 8 hours as needed for Anxiety     Dispense:  90 tablet     Refill:  5    omeprazole (PRILOSEC) 40 MG delayed release capsule     Sig: Take 1 capsule by mouth every morning (before breakfast)     Dispense:  90 capsule     Refill:  1    triamcinolone (ARISTOCORT) 0.5 % cream     Sig: Apply topically 3 times daily. Dispense:  30 g     Refill:  0        Patient given educational materials - see patient instructions. Discussed use, benefit, and side effects of prescribed medications. All patientquestions answered.   Pt voiced understanding. Patient given educational materials - see patient instructions. Discussed use, benefit, and side effects of prescribed medications. All patientquestions answered. Pt voiced understanding. This note was transcribed using dictation with Dragon services. Efforts were made to correct any errors but some words may be misinterpreted.     Electronically signed by URIEL Kaplan CNP on 11/15/2021at 6:01 PM

## 2021-11-15 NOTE — PROGRESS NOTES
Visit Information    Have you changed or started any medications since your last visit including any over-the-counter medicines, vitamins, or herbal medicines? no   Have you stopped taking any of your medications? Is so, why? -  no  Are you having any side effects from any of your medications? - no    Have you seen any other physician or provider since your last visit?  no   Have you had any other diagnostic tests since your last visit?  no   Have you been seen in the emergency room and/or had an admission in a hospital since we last saw you?  no   Have you had your routine dental cleaning in the past 6 months?  no     Do you have an active MyChart account? If no, what is the barrier?   Yes    Patient Care Team:  URIEL Harden CNP as PCP - General (Certified Nurse Practitioner)  URIEL Harden CNP as PCP - [facility]    Medical History Review  Past Medical, Family, and Social History reviewed and  contribute to the patient presenting condition    Health Maintenance   Topic Date Due    Hepatitis C screen  Never done    COVID-19 Vaccine (1) Never done    HIV screen  Never done    Chlamydia screen  Never done    Flu vaccine (1) 09/01/2021    DTaP/Tdap/Td vaccine (7 - Td or Tdap) 06/04/2023    Hepatitis A vaccine  Completed    Hepatitis B vaccine  Completed    Hib vaccine  Completed    HPV vaccine  Completed    Varicella vaccine  Completed    Meningococcal (ACWY) vaccine  Aged Out    Pneumococcal 0-64 years Vaccine  Aged Out

## 2021-11-15 NOTE — PATIENT INSTRUCTIONS

## 2021-11-15 NOTE — TELEPHONE ENCOUNTER
----- Message from [facility] sent at 11/15/2021 12:37 PM EST -----  Subject: Refill Request    QUESTIONS  Name of Medication? omeprazole (PRILOSEC) 20 MG delayed release capsule  Patient-reported dosage and instructions? 1 capsule twice a day   How many days do you have left? 0  Preferred Pharmacy? [id number]  Pharmacy phone number (if available)? [phone]  ---------------------------------------------------------------------------  --------------  Mary Kate RODRIGUEZ  What is the best way for the office to contact you? OK to leave message on   voicemail  Preferred Call Back Phone Number?  [phone]

## 2022-01-06 DIAGNOSIS — L20.9 ATOPIC DERMATITIS, UNSPECIFIED TYPE: ICD-10-CM

## 2022-01-06 RX ORDER — TRIAMCINOLONE ACETONIDE 5 MG/G
CREAM TOPICAL
Qty: 30 G | Refills: 0 | Status: SHIPPED
Start: 2022-01-06 | End: 2022-05-26 | Stop reason: ALTCHOICE

## 2022-01-06 NOTE — TELEPHONE ENCOUNTER
"Clinical Nutrition Note      Patient Name: David Dempsey  MRN: [id number]  Admission date: 10/11/2020      Multidisciplinary Rounds    Additional information obtained during MDR:  RN reports pt continues to have HA pain and nausea on IV phenergan; Na+ down to 134, 1.5% hypertonic saline ordered. Pt is not drinking much and eating poorly.    Current diet: Diet Regular    Oral Nutrition Supplement:     Pertinent medical data reviewed:  No nutrition risk identified on nursing screen; MST score \"0\"    Intervention:  RD visitation for food pref; menu adjusted for better tolerance w/ ongoing nausea. Pt declined supplements does not want anything sweet.  Plan of care and goals reviewed    Monitor:  RD to follow per protocol      Denae Sheikh MS,RD,LD  10/15/20 16:05 EDT  Time: 15  mins       " lov 11/15/21

## 2022-01-08 ENCOUNTER — HOSPITAL ENCOUNTER (OUTPATIENT)
Age: 22
Setting detail: SPECIMEN
Discharge: HOME | End: 2022-01-08

## 2022-01-08 ENCOUNTER — OFFICE VISIT (OUTPATIENT)
Dept: PRIMARY CARE CLINIC | Age: 22
End: 2022-01-08
Payer: COMMERCIAL

## 2022-01-08 VITALS
HEART RATE: 106 BPM | OXYGEN SATURATION: 96 % | SYSTOLIC BLOOD PRESSURE: 124 MMHG | HEIGHT: 70 IN | BODY MASS INDEX: 36.36 KG/M2 | DIASTOLIC BLOOD PRESSURE: 65 MMHG | TEMPERATURE: 98.7 F | WEIGHT: 254 LBS

## 2022-01-08 DIAGNOSIS — Z20.822 CONTACT WITH AND (SUSPECTED) EXPOSURE TO COVID-19: Primary | ICD-10-CM

## 2022-01-08 DIAGNOSIS — R11.2 INTRACTABLE VOMITING WITH NAUSEA, UNSPECIFIED VOMITING TYPE: ICD-10-CM

## 2022-01-08 DIAGNOSIS — L20.9 ATOPIC DERMATITIS, UNSPECIFIED TYPE: ICD-10-CM

## 2022-01-08 PROCEDURE — 99213 OFFICE O/P EST LOW 20 MIN: CPT

## 2022-01-08 RX ORDER — ONDANSETRON 4 MG/1
4 TABLET, ORALLY DISINTEGRATING ORAL EVERY 8 HOURS PRN
Qty: 9 TABLET | Refills: 0 | Status: SHIPPED | OUTPATIENT
Start: 2022-01-08 | End: 2022-01-11

## 2022-01-08 RX ORDER — CLOBETASOL PROPIONATE 0.5 MG/G
CREAM TOPICAL
Qty: 60 G | Refills: 0 | Status: SHIPPED
Start: 2022-01-08 | End: 2022-05-26 | Stop reason: DRUGHIGH

## 2022-01-08 ASSESSMENT — PATIENT HEALTH QUESTIONNAIRE - PHQ9
SUM OF ALL RESPONSES TO PHQ QUESTIONS 1-9: 0
1. LITTLE INTEREST OR PLEASURE IN DOING THINGS: 0
2. FEELING DOWN, DEPRESSED OR HOPELESS: 0
SUM OF ALL RESPONSES TO PHQ9 QUESTIONS 1 & 2: 0

## 2022-01-08 ASSESSMENT — ENCOUNTER SYMPTOMS: SINUS COMPLAINT: 1

## 2022-01-08 NOTE — PATIENT INSTRUCTIONS
Patient Education        Learning About Coronavirus [phone])  What is coronavirus (COVID-19)? COVID-19 is a disease caused by a type of coronavirus. This illness was first found in December 2019. It has since spread worldwide. Coronaviruses are a large group of viruses. They cause the common cold. They also cause more serious illnesses like [place] respiratory syndrome (MERS) and severe acute respiratory syndrome (SARS). COVID-19 is caused by a novel coronavirus. That means it's a new type that has not been seen in people before. What are the symptoms? COVID-19 symptoms may include:  · Fever. · Cough. · Trouble breathing. · Chills or repeated shaking with chills. · Muscle and body aches. · Headache. · Sore throat. · New loss of taste or smell. · Vomiting. · Diarrhea. In severe cases, COVID-19 can cause pneumonia and make it hard to breathe without help from a machine. It can cause death. How is it diagnosed? COVID-19 is diagnosed with a viral test. This may also be called a PCR test or antigen test. It looks for evidence of the virus in your breathing passages or lungs (respiratory system). The test is most often done on a sample from the nose, throat, or lungs. It's sometimes done on a sample of saliva. One way a sample is collected is by putting a long swab into the back of your nose. How is it treated? Mild cases of COVID-19 can be treated at home. Serious cases need treatment in the hospital. Treatment may include medicines to reduce symptoms, plus breathing support such as oxygen therapy or a ventilator. Some people may be placed on their belly to help their oxygen levels. Treatments that may help people who have COVID-19 include:  Antiviral medicines. These medicines treat viral infections. Remdesivir is an example. Immune-based therapy. These medicines help the immune system fight COVID-19. Examples include monoclonal antibodies. Blood thinners.   These medicines help prevent blood clots. People with severe illness are at risk for blood clots. How can you protect yourself and others? · Get vaccinated. · Avoid sick people. · Cover your mouth with a tissue when you cough or sneeze. · Wash your hands often, especially after you cough or sneeze. Use soap and water, and scrub for at least 20 seconds. If soap and water aren't available, use an alcohol-based hand . · Avoid touching your mouth, nose, and eyes. Be sure to follow all instructions from the [facility] and your local health authorities. Here are some examples of specific precautions you may need to take. · If you are not fully vaccinated:  ? Wear a mask if you have to go to public areas. ? Avoid crowds and try to stay at least 6 feet away from other people. · If you have been exposed to the virus and are not fully vaccinated:  ? Stay home. Don't go to school, work, or public areas. And don't use public transportation, ride-shares, or taxis unless you have no choice. ? Wear a mask if you have to go to public areas, like the pharmacy. · Even if you're fully vaccinated, there's still a small chance you can get and spread COVID-19. If you live in an area where COVID-19 is spreading quickly, wear a mask if you have to go to indoor public areas. You might also want to wear a mask in crowded outdoor areas if you:  ? Have certain health conditions. ? Live with someone who has a compromised immune system. ? Live with someone who is not fully vaccinated. · If you have been exposed and you are fully vaccinated:  ? Talk to your doctor. You may need a COVID-19 test.  ? Wear a mask in public indoor spaces for 14 days or until you test negative for COVID-19. If you're sick:  · Leave your home only if you need to get medical care. But call the doctor's office first so they know you're coming. And wear a mask. · Wear a mask whenever you're around other people. · Limit contact with pets and people in your home.  If possible, stay in a separate bedroom and use a separate bathroom. · Clean and disinfect your home every day. Use household  and disinfectant wipes or sprays. Take special care to clean things that you touch with your hands. How can you self-isolate when you have COVID-19? If you have COVID-19, there are things you can do to help avoid spreading the virus to others. · Limit contact with people in your home. If possible, stay in a separate bedroom and use a separate bathroom. · Wear a mask when you are around other people. · If you have to leave home, avoid crowds and try to stay at least 6 feet away from other people. · Avoid contact with pets and other animals. · Cover your mouth and nose with a tissue when you cough or sneeze. Then throw it in the trash right away. · Wash your hands often, especially after you cough or sneeze. Use soap and water, and scrub for at least 20 seconds. If soap and water aren't available, use an alcohol-based hand . · Don't share personal household items. These include bedding, towels, cups and glasses, and eating utensils. · [address] in the warmest water allowed for the fabric type, and dry it completely. It's okay to wash other people's laundry with yours. · Clean and disinfect your home. Use household  and disinfectant wipes or sprays. When should you call for help? Call 911 anytime you think you may need emergency care. For example, call if you have life-threatening symptoms, such as:    · You have severe trouble breathing. (You can't talk at all.)     · You have constant chest pain or pressure.     · You are severely dizzy or lightheaded.     · You are confused or can't think clearly.     · You have pale, gray, or blue-colored skin or lips.     · You pass out (lose consciousness) or are very hard to wake up. Call your doctor now or seek immediate medical care if:    · You have moderate trouble breathing.  (You can't speak a full sentence.)     · You are

## 2022-01-08 NOTE — LETTER
[address] [address]  Phone: [phone]  Fax: [phone]    URIEL Henderson CNP        January 8, 2022     Patient: Alesia Saeed   YOB: 2000   Date of Visit: 1/8/2022       To Whom It May Concern: It is my medical opinion that Alesia Murvivi be excused 1/8/2022 due to medical illness. She is currently awaiting COVID 19 testing results. If you have any questions or concerns, please don't hesitate to call.     Sincerely,        URIEL Henderson CNP

## 2022-01-08 NOTE — PROGRESS NOTES
[address] [place] [facility] [id number] [address] WALK IN CARE  8677 [address] [place] [address]  Dept: [phone]    Mitchell Mejia is a 22 y.o. female Established patient, who presents to the walk-in clinic today with conditions/complaints as noted below:    Chief Complaint   Patient presents with    Covid Testing     pt has been having congestion headache and has been having nausea and GI X 1 week          HPI:     Sinus Problem  This is a new problem. The current episode started in the past 7 days. The problem is unchanged. There has been no fever. The pain is moderate. Pertinent negatives include no chills, congestion, coughing, diaphoresis, ear pain, headaches, hoarse voice, neck pain, shortness of breath, sinus pressure, sneezing, sore throat or swollen glands. Past treatments include nothing. Past Medical History:   Diagnosis Date    Allergic     Anxiety     Asthma     Cyst of ovary, right     Eczema     Headache     Hypoglycemia     Neurocardiogenic syncope     Pneumonia     Reflux esophagitis     Seizures (HCC)        Current Outpatient Medications   Medication Sig Dispense Refill    ondansetron (ZOFRAN ODT) 4 MG disintegrating tablet Take 1 tablet by mouth every 8 hours as needed for Nausea or Vomiting 9 tablet 0    clobetasol (TEMOVATE) 0.05 % cream Apply topically 2 times daily.  60 g 0    triamcinolone (ARISTOCORT) 0.5 % cream APPLY TO AFFECTED AREA(S) THREE TIMES A DAY 30 g 0    sertraline (ZOLOFT) 100 MG tablet TAKE TWO TABLETS BY MOUTH DAILY 60 tablet 4    hydrOXYzine (ATARAX) 50 MG tablet Take 1 tablet by mouth every 8 hours as needed for Anxiety 90 tablet 5    omeprazole (PRILOSEC) 40 MG delayed release capsule Take 1 capsule by mouth every morning (before breakfast) 90 capsule 1    azelastine (ASTELIN) 0.1 % nasal spray 2 sprays by Nasal route 2 times daily Use in each nostril as directed 60 mL 0    Plecanatide 3 MG TABS Take 3 mg by mouth daily      vitamin D3 (CHOLECALCIFEROL) 25 MCG (1000 UT) TABS tablet Take 1 tablet by mouth daily 30 tablet 2    LOW-OGESTREL 0.3-30 MG-MCG per tablet TAKE ONE TABLET BY MOUTH DAILY 28 tablet 5    albuterol sulfate HFA (VENTOLIN HFA) 108 (90 Base) MCG/ACT inhaler Inhale 2 puffs into the lungs every 6 hours as needed for Wheezing 1 Inhaler 2     No current facility-administered medications for this visit. No Known Allergies    Review of Systems:     Review of Systems   Constitutional: Negative. Negative for chills, diaphoresis, fatigue and fever. HENT: Negative for congestion, ear pain, hoarse voice, postnasal drip, rhinorrhea, sinus pressure, sinus pain, sneezing and sore throat. Eyes: Negative. Negative for pain, discharge and itching. Respiratory: Negative. Negative for cough, chest tightness, shortness of breath and wheezing. Cardiovascular: Negative. Negative for chest pain, palpitations and leg swelling. Gastrointestinal: Positive for abdominal pain and nausea. Negative for diarrhea and vomiting. Musculoskeletal: Negative for neck pain. Skin: Positive for rash. Neurological: Negative. Negative for dizziness, weakness, numbness and headaches. Physical Exam:      /65 (Site: Left Upper Arm, Position: Sitting, Cuff Size: Large Adult)   Pulse 106   Temp 98.7 °F (37.1 °C) (Tympanic)   Ht 5' 10\" (1.778 m)   Wt 254 lb (115.2 kg)   SpO2 96%   Breastfeeding No   BMI 36.45 kg/m²     Physical Exam  Vitals reviewed. Constitutional:       Appearance: Normal appearance. She is normal weight. HENT:      Head: Normocephalic and atraumatic. Right Ear: Tympanic membrane, ear canal and external ear normal.      Left Ear: Tympanic membrane, ear canal and external ear normal.      Nose: Congestion and rhinorrhea present. Mouth/Throat:      Mouth: Mucous membranes are moist.      Pharynx: Oropharynx is clear. No oropharyngeal exudate or posterior oropharyngeal erythema. Eyes:      Extraocular Movements: Extraocular movements intact. Conjunctiva/sclera: Conjunctivae normal.      Pupils: Pupils are equal, round, and reactive to light. Cardiovascular:      Rate and Rhythm: Regular rhythm. Tachycardia present. Pulses: Normal pulses. Heart sounds: Normal heart sounds. Pulmonary:      Effort: Pulmonary effort is normal.      Breath sounds: Normal breath sounds and air entry. Abdominal:      General: Abdomen is flat. Bowel sounds are normal.      Palpations: Abdomen is soft. Tenderness: There is generalized abdominal tenderness. There is no right CVA tenderness, left CVA tenderness, guarding or rebound. Negative signs include Loomis's sign, Rovsing's sign, McBurney's sign, psoas sign and obturator sign. Musculoskeletal:         General: Normal range of motion. Cervical back: Normal range of motion and neck supple. Skin:     General: Skin is warm and dry. Capillary Refill: Capillary refill takes less than 2 seconds. Findings: Rash present. Rash is scaling. Comments: Dry and scaly skin throughout bilateral hands. Hx of atopic dermatitis, Kenalog cream isn't doing the job. Neurological:      General: No focal deficit present. Mental Status: She is alert and oriented to person, place, and time. Mental status is at baseline. Cranial Nerves: Cranial nerves are intact. Sensory: Sensation is intact. Motor: Motor function is intact. Coordination: Coordination is intact. Gait: Gait is intact. Psychiatric:         Mood and Affect: Mood normal.         Behavior: Behavior normal.         Plan:          1. Contact with and (suspected) exposure to covid-19  -     COVID-19; Future  2.  Intractable vomiting with nausea, unspecified vomiting type  -     [facility] ODT) 4 MG disintegrating tablet; Take 1 tablet by mouth every 8 hours as needed for Nausea or Vomiting, Disp-9 tablet, R-0Normal  3. Atopic dermatitis, unspecified type  -     clobetasol (TEMOVATE) 0.05 % cream; Apply topically 2 times daily. , Disp-60 g, R-0, Normal   Continue over-the-counter medications as needed for symptoms. Use honey to the back of throat, salt water gargle as needed for sore throat, cough. Go to the ER for shortness of breath, chest pain. Patient verbalized understanding. Follow Up Instructions:      Return if symptoms worsen or fail to improve, for SOB, chest pain go to ER. Orders Placed This Encounter   Medications    ondansetron (ZOFRAN ODT) 4 MG disintegrating tablet     Sig: Take 1 tablet by mouth every 8 hours as needed for Nausea or Vomiting     Dispense:  9 tablet     Refill:  0    clobetasol (TEMOVATE) 0.05 % cream     Sig: Apply topically 2 times daily. Dispense:  60 g     Refill:  0           Will send out COVID19 testing. Possible treatment alterations based on the results. Patient instructed to self-quarantine until testing results are back. Patient instructed not to return to work until results are back. Tylenol as needed for fever/pain. Encouraged adequate hydration and rest.  The patient indicates understanding of these issues and agrees with the plan. Educational materials provided on AVS.  Follow up if symptoms do not improve/worsen. Discussed symptoms that will warrant urgent ED evaluation/treatment. Patient and/or parent given educational materials - see patient instructions. Discussed use, benefit, and side effects of prescribed medications. All patient questions answered. Patient and/or parent voiced understanding.       Electronically signed by URIEL Hicks 1/8/2022 at 2:48 PM

## 2022-01-09 DIAGNOSIS — Z20.822 CONTACT WITH AND (SUSPECTED) EXPOSURE TO COVID-19: ICD-10-CM

## 2022-01-10 LAB
SARS-COV-2: ABNORMAL
SARS-COV-2: DETECTED
SOURCE: ABNORMAL

## 2022-01-10 ASSESSMENT — ENCOUNTER SYMPTOMS
RESPIRATORY NEGATIVE: 1
HOARSE VOICE: 0
DIARRHEA: 0
EYE PAIN: 0
RHINORRHEA: 0
WHEEZING: 0
EYES NEGATIVE: 1
SHORTNESS OF BREATH: 0
EYE ITCHING: 0
SORE THROAT: 0
SWOLLEN GLANDS: 0
COUGH: 0
SINUS PRESSURE: 0
EYE DISCHARGE: 0
VOMITING: 0
ABDOMINAL PAIN: 1
NAUSEA: 1
CHEST TIGHTNESS: 0
SINUS PAIN: 0

## 2022-04-21 ENCOUNTER — OFFICE VISIT (OUTPATIENT)
Dept: PRIMARY CARE CLINIC | Age: 22
End: 2022-04-21
Payer: COMMERCIAL

## 2022-04-21 ENCOUNTER — HOSPITAL ENCOUNTER (OUTPATIENT)
Age: 22
Setting detail: SPECIMEN
Discharge: HOME | End: 2022-04-21

## 2022-04-21 VITALS
HEIGHT: 70 IN | DIASTOLIC BLOOD PRESSURE: 84 MMHG | BODY MASS INDEX: 36.36 KG/M2 | TEMPERATURE: 98.7 F | HEART RATE: 94 BPM | WEIGHT: 254 LBS | OXYGEN SATURATION: 98 % | SYSTOLIC BLOOD PRESSURE: 135 MMHG

## 2022-04-21 DIAGNOSIS — N89.8 VAGINAL DISCHARGE: ICD-10-CM

## 2022-04-21 DIAGNOSIS — B34.9 VIRAL ILLNESS: Primary | ICD-10-CM

## 2022-04-21 DIAGNOSIS — R11.0 NAUSEA IN ADULT: ICD-10-CM

## 2022-04-21 LAB
CANDIDA SPECIES, DNA PROBE: NEGATIVE
GARDNERELLA VAGINALIS, DNA PROBE: NEGATIVE
SOURCE: NORMAL
TRICHOMONAS VAGINALIS DNA: NEGATIVE

## 2022-04-21 PROCEDURE — 99213 OFFICE O/P EST LOW 20 MIN: CPT | Performed by: NURSE PRACTITIONER

## 2022-04-21 RX ORDER — ONDANSETRON 4 MG/1
4 TABLET, ORALLY DISINTEGRATING ORAL EVERY 8 HOURS PRN
Qty: 10 TABLET | Refills: 0 | Status: SHIPPED | OUTPATIENT
Start: 2022-04-21 | End: 2022-08-19

## 2022-04-21 RX ORDER — PREDNISONE 20 MG/1
40 TABLET ORAL DAILY
Qty: 10 TABLET | Refills: 0 | Status: SHIPPED | OUTPATIENT
Start: 2022-04-21 | End: 2022-04-26

## 2022-04-21 ASSESSMENT — ENCOUNTER SYMPTOMS
CHEST TIGHTNESS: 0
SORE THROAT: 1
SHORTNESS OF BREATH: 0
WHEEZING: 0
VOMITING: 1
VOICE CHANGE: 0
EYE REDNESS: 0
NAUSEA: 1
ABDOMINAL PAIN: 1
EYE DISCHARGE: 0
SINUS PRESSURE: 0
CONSTIPATION: 0
COUGH: 0
DIARRHEA: 1
ANAL BLEEDING: 0

## 2022-04-21 NOTE — PATIENT INSTRUCTIONS
Patient Education        Nausea and Vomiting: Care Instructions  Overview     When you are nauseated, you may feel weak and sweaty and notice a lot of saliva in your mouth. Nausea often leads to vomiting. Most of the time you do not needto worry about nausea and vomiting, but they can be signs of other illnesses. Two common causes of nausea and vomiting are a stomach infection and food poisoning. Nausea and vomiting from a viral stomach infection will usually start to improve within 24 hours. Nausea and vomiting from food poisoning maylast from 12 to 48 hours. The doctor has checked you carefully, but problems can develop later. If you notice any problems or new symptoms, get medical treatment right away. Follow-up care is a key part of your treatment and safety. Be sure to make and go to all appointments, and call your doctor if you are having problems. It's also a good idea to know your test results and keep alist of the medicines you take. How can you care for yourself at home?  To prevent dehydration, drink plenty of fluids. Choose water and other clear liquids until you feel better. If you have kidney, heart, or liver disease and have to limit fluids, talk with your doctor before you increase the amount of fluids you drink.  Rest in bed until you feel better.  When you are able to eat, try clear soups, mild foods, and liquids until all symptoms are gone for 12 to 48 hours. Other good choices include dry toast, crackers, cooked cereal, and gelatin dessert, such as Jell-O. When should you call for help? Call 911 anytime you think you may need emergency care. For example, call if:     You passed out (lost consciousness). Call your doctor now or seek immediate medical care if:     You have symptoms of dehydration, such as:  ? Dry eyes and a dry mouth. ? Passing only a little urine. ? Feeling thirstier than usual.      You have new or worsening belly pain.      You have a new or higher fever.    You vomit blood or what looks like coffee grounds. Watch closely for changes in your health, and be sure to contact your doctor if:     You have ongoing nausea and vomiting.      Your vomiting is getting worse.      Your vomiting lasts longer than 2 days.      You are not getting better as expected. Where can you learn more? Go to https://chpepiceweb.[employer]. org and sign in to your [employer] account. Enter 25 [id number] in the [employer] box to learn more about \"Nausea and Vomiting: Care Instructions. \"     If you do not have an account, please click on the \"Sign Up Now\" link. Current as of: July 1, 2021               Content Version: 13.2  © 2006-2022 [employer]. Care instructions adapted under license by [employer] [employer] [facility] ([facility]). If you have questions about a medical condition or this instruction, always ask your healthcare professional. [place] [address] any warranty or liability for your use of this information. Patient Education        Viral Infections: Care Instructions  Overview     You don't feel well, but it's not clear what's causing it. You may have a viral infection. Viruses cause many illnesses, such as the common cold, influenza, fever, rashes, and the diarrhea, nausea, and vomiting that are symptoms of a stomach infection. You may wonder if antibiotic medicines could make you feel better. But antibiotics only treat infections caused by bacteria. They don'twork on viruses. The good news is that viral infections usually aren't serious. Most will go away in a few days without medical treatment. In the meantime, there are a fewthings you can do to make yourself more comfortable. Follow-up care is a key part of your treatment and safety. Be sure to make and go to all appointments, and call your doctor if you are having problems. It's also a good idea to know your test results and keep alist of the medicines you take.   How can you care for yourself at home?  Get plenty of rest if you feel tired.  Take an over-the-counter pain medicine if needed, such as acetaminophen (Tylenol), ibuprofen (Advil, Motrin), or naproxen (Aleve). Read and follow all instructions on the label.  Be careful when taking over-the-counter cold or flu medicines and Tylenol at the same time. Many of these medicines have acetaminophen, which is Tylenol. Read the labels to make sure that you are not taking more than the recommended dose. Too much acetaminophen (Tylenol) can be harmful.  Drink plenty of fluids. If you have kidney, heart, or liver disease and have to limit fluids, talk with your doctor before you increase the amount of fluids you drink.  Stay home from work, school, and other public places while you have a fever. When should you call for help? Call 911 anytime you think you may need emergency care. For example, call if:     You have severe trouble breathing.      You passed out (lost consciousness). Call your doctor now or seek immediate medical care if:     You seem to be getting much sicker.      You have a new or higher fever.      You have blood in your stools.      You have new belly pain, or your pain gets worse.      You have a new rash. Watch closely for changes in your health, and be sure to contact your doctor if:     You start to get better and then get worse.      You do not get better as expected. Where can you learn more? Go to https://[employer].Travelmenu. org and sign in to your [employer] account. Enter W083 in the [employer] box to learn more about \"Viral Infections: Care Instructions. \"     If you do not have an account, please click on the \"Sign Up Now\" link. Current as of: July 1, 2021               Content Version: 13.2  © 3782-1286 [employer], [employer]. Care instructions adapted under license by [address]  If you have questions about a medical condition or this instruction, always ask your healthcare professional. [address] any warranty or liability for your use of this information.

## 2022-04-21 NOTE — PROGRESS NOTES
[address] WALK IN CARE  [address] [address]  Dept: [phone]  Dept Fax: [phone]     Juan Christensen is a 22 y.o. female who presents to the urgent care today for her medicalconditions/complaints as noted below. Juan Christensen is c/o of Nausea & Vomiting (pt has been having nausea vomiting)    HPI:      Nausea & Vomiting  This is a new problem. The current episode started in the past 7 days. The problem has been waxing and waning. Associated symptoms include abdominal pain (cramping), congestion, headaches, nausea, a sore throat (mild) and vomiting. Pertinent negatives include no chest pain, chills, coughing, fatigue, fever, myalgias, rash or weakness. The symptoms are aggravated by drinking and eating. Treatments tried: otc tx. The treatment provided no relief. Ill contacts at work and at home. Past Medical History:   Diagnosis Date    Allergic     Anxiety     Asthma     Cyst of ovary, right     Eczema     Headache     Hypoglycemia     Neurocardiogenic syncope     Pneumonia     Reflux esophagitis     Seizures (HCC)       Current Outpatient Medications   Medication Sig Dispense Refill    ondansetron (ZOFRAN ODT) 4 MG disintegrating tablet Take 1 tablet by mouth every 8 hours as needed for Nausea or Vomiting 10 tablet 0    predniSONE (DELTASONE) 20 MG tablet Take 2 tablets by mouth daily for 5 days 10 tablet 0    clobetasol (TEMOVATE) 0.05 % cream Apply topically 2 times daily.  60 g 0    triamcinolone (ARISTOCORT) 0.5 % cream APPLY TO AFFECTED AREA(S) THREE TIMES A DAY 30 g 0    sertraline (ZOLOFT) 100 MG tablet TAKE TWO TABLETS BY MOUTH DAILY 60 tablet 4    hydrOXYzine (ATARAX) 50 MG tablet Take 1 tablet by mouth every 8 hours as needed for Anxiety 90 tablet 5    omeprazole (PRILOSEC) 40 MG delayed release capsule Take 1 capsule by mouth every morning (before breakfast) 90 capsule 1    azelastine (ASTELIN) 0.1 % nasal spray 2 sprays by Nasal route 2 times daily Use in each nostril as directed 60 mL 0    Plecanatide 3 MG TABS Take 3 mg by mouth daily      vitamin D3 (CHOLECALCIFEROL) 25 MCG (1000 UT) TABS tablet Take 1 tablet by mouth daily 30 tablet 2    LOW-OGESTREL 0.3-30 MG-MCG per tablet TAKE ONE TABLET BY MOUTH DAILY 28 tablet 5    albuterol sulfate HFA (VENTOLIN HFA) 108 (90 Base) MCG/ACT inhaler Inhale 2 puffs into the lungs every 6 hours as needed for Wheezing 1 Inhaler 2     No current facility-administered medications for this visit. No Known Allergies    Reviewed PMH, SH, and FH with the patient and updated. Subjective:      Review of Systems   Constitutional: Negative for chills, fatigue and fever. HENT: Positive for congestion, postnasal drip and sore throat (mild). Negative for ear discharge, ear pain, sinus pressure, sneezing and voice change. Eyes: Negative for discharge and redness. Respiratory: Negative for cough, chest tightness, shortness of breath and wheezing. Cardiovascular: Negative. Negative for chest pain. Gastrointestinal: Positive for abdominal pain (cramping), diarrhea (improved), nausea and vomiting. Negative for anal bleeding and constipation. Genitourinary: Positive for pelvic pain, vaginal discharge (increased amount) and vaginal pain (at times, get a sharp pain in groin/vagina). Negative for dysuria, frequency, hematuria, urgency and vaginal bleeding. Musculoskeletal: Negative for myalgias. Skin: Negative for rash. Neurological: Positive for headaches. Negative for dizziness, weakness and light-headedness. Hematological: Negative for adenopathy. All other systems reviewed and are negative. Objective:      Physical Exam  Vitals and nursing note reviewed. Constitutional:       General: She is not in acute distress. Appearance: Normal appearance. She is well-developed. She is not ill-appearing, toxic-appearing or diaphoretic.    HENT: Head: Normocephalic. Right Ear: Tympanic membrane and external ear normal.      Left Ear: Tympanic membrane and external ear normal.      Nose: Nose normal.      Right Sinus: No maxillary sinus tenderness or frontal sinus tenderness. Left Sinus: No maxillary sinus tenderness or frontal sinus tenderness. Mouth/Throat:      Pharynx: Oropharyngeal exudate (PND) and posterior oropharyngeal erythema (mild) present. Eyes:      General:         Right eye: No discharge. Left eye: No discharge. Cardiovascular:      Rate and Rhythm: Normal rate and regular rhythm. Heart sounds: Normal heart sounds. No murmur heard. Pulmonary:      Effort: Pulmonary effort is normal. No respiratory distress. Breath sounds: Normal breath sounds. No wheezing or rales. Abdominal:      General: Abdomen is flat. Bowel sounds are normal. There is no distension. Palpations: Abdomen is soft. Tenderness: There is abdominal tenderness (mild) in the left lower quadrant. Genitourinary:     Comments: Deferred. Lymphadenopathy:      Cervical: No cervical adenopathy. Skin:     General: Skin is warm. Findings: No rash. Neurological:      Mental Status: She is alert. /84 (Site: Left Upper Arm, Position: Sitting, Cuff Size: Large Adult)   Pulse 94   Temp 98.7 °F (37.1 °C) (Tympanic)   Ht 5' 10\" (1.778 m)   Wt 254 lb (115.2 kg)   SpO2 98%   Breastfeeding No   BMI 36.45 kg/m²     Assessment:       Diagnosis Orders   1. Viral illness  predniSONE (DELTASONE) 20 MG tablet   2. Vaginal discharge  Vaginitis DNA Probe   3. Nausea in adult  ondansetron (ZOFRAN ODT) 4 MG disintegrating tablet     Plan:      I believe that this is likely a viral illness based on the physical exam findings. Zofran as needed for nausea/vomiting. Prednisone sent to the pharmacy to help enable symptom relief. Vaginitis DNA probe sent out to the lab for testing.  Treatment determinations based on the results. Tylenol/Motrin for fever/discomfort. Note provided to excuse absence from work due to illness. Patient agreeable to treatment plan. Educational materials provided on AVS.  Follow up if symptoms do not improve/worsen. Orders Placed This Encounter   Medications    ondansetron (ZOFRAN ODT) 4 MG disintegrating tablet     Sig: Take 1 tablet by mouth every 8 hours as needed for Nausea or Vomiting     Dispense:  10 tablet     Refill:  0    predniSONE (DELTASONE) 20 MG tablet     Sig: Take 2 tablets by mouth daily for 5 days     Dispense:  10 tablet     Refill:  0        Patient given educational materials - see patient instructions. Discussed use, benefit, and side effects of prescribed medications. All patientquestions answered. Pt voiced understanding.     Electronically signed by URIEL Holder CNP on 4/21/2022at 9:19 AM

## 2022-04-21 NOTE — LETTER
[address] [address]  Phone: [phone]  Fax: [phone]    URIEL Harris CNP        April 21, 2022     Patient: Klaudia Zhu   YOB: 2000   Date of Visit: 4/21/2022       To Whom it May Concern:    Klaudia Zhu was seen in my clinic on 4/21/2022. Please excuse her illness related absence on 4/21/2022, 4/22/2022, and 4/23/2022. If you have any questions or concerns, please don't hesitate to call.     Sincerely,         URIEL Harris CNP

## 2022-05-18 ENCOUNTER — OFFICE VISIT (OUTPATIENT)
Dept: PRIMARY CARE CLINIC | Age: 22
End: 2022-05-18
Payer: COMMERCIAL

## 2022-05-18 VITALS
DIASTOLIC BLOOD PRESSURE: 82 MMHG | SYSTOLIC BLOOD PRESSURE: 137 MMHG | HEIGHT: 70 IN | HEART RATE: 98 BPM | WEIGHT: 254 LBS | BODY MASS INDEX: 36.36 KG/M2 | TEMPERATURE: 98.4 F | OXYGEN SATURATION: 98 %

## 2022-05-18 DIAGNOSIS — J01.90 ACUTE BACTERIAL SINUSITIS: Primary | ICD-10-CM

## 2022-05-18 DIAGNOSIS — Z87.2: ICD-10-CM

## 2022-05-18 DIAGNOSIS — B96.89 ACUTE BACTERIAL SINUSITIS: Primary | ICD-10-CM

## 2022-05-18 PROCEDURE — 99213 OFFICE O/P EST LOW 20 MIN: CPT

## 2022-05-18 RX ORDER — OMEPRAZOLE 20 MG/1
CAPSULE, DELAYED RELEASE ORAL
COMMUNITY
Start: 2022-04-01

## 2022-05-18 RX ORDER — AZITHROMYCIN 250 MG/1
250 TABLET, FILM COATED ORAL SEE ADMIN INSTRUCTIONS
Qty: 6 TABLET | Refills: 0 | Status: SHIPPED | OUTPATIENT
Start: 2022-05-18 | End: 2022-05-23

## 2022-05-18 ASSESSMENT — ENCOUNTER SYMPTOMS
COLOR CHANGE: 0
GASTROINTESTINAL NEGATIVE: 1
APNEA: 0
RHINORRHEA: 0
EYE ITCHING: 0
SWOLLEN GLANDS: 0
FACIAL SWELLING: 0
HOARSE VOICE: 0
EYE DISCHARGE: 0
ANAL BLEEDING: 0
VOICE CHANGE: 0
VOMITING: 0
ABDOMINAL PAIN: 0
EYES NEGATIVE: 1
COUGH: 1
CHEST TIGHTNESS: 0
BACK PAIN: 0
ABDOMINAL DISTENTION: 0
SHORTNESS OF BREATH: 0
NAUSEA: 0
RECTAL PAIN: 0
CONSTIPATION: 0
EYE REDNESS: 0
STRIDOR: 0
BLOOD IN STOOL: 0
WHEEZING: 0
PHOTOPHOBIA: 0
DIARRHEA: 0
SORE THROAT: 1
CHOKING: 0
EYE PAIN: 0
TROUBLE SWALLOWING: 0
SINUS PAIN: 0
SINUS PRESSURE: 1

## 2022-05-18 NOTE — PROGRESS NOTES
[address] [place] [facility] [id number] [place] [address] WALK IN CARE  [address]  [place] [address]  Dept: Lindsay Martins is a 22 y.o. female Established patient, who presents to the walk-in clinic today with conditions/complaints as noted below:    Chief Complaint   Patient presents with    Sinusitis     pt has been having sinus congestion pressure and has been having facial pain x 5 days needs note for work          HPI:     Sinusitis  This is a new problem. Episode onset: 5 days ago. The problem has been gradually worsening since onset. There has been no fever. The pain is moderate. Associated symptoms include congestion, coughing (dry), ear pain, headaches, sinus pressure and a sore throat. Pertinent negatives include no chills, diaphoresis, hoarse voice, neck pain, shortness of breath, sneezing or swollen glands. Treatments tried: cough drops. The treatment provided mild relief. Unsure about COVID testing at this time. She is c/o atopic dermatitis that has worsened and has already tried steroids/emolients.    Past Medical History:   Diagnosis Date    Allergic     Anxiety     Asthma     Cyst of ovary, right     Eczema     Headache     Hypoglycemia     Neurocardiogenic syncope     Pneumonia     Reflux esophagitis     Seizures (HCC)        Current Outpatient Medications   Medication Sig Dispense Refill    azithromycin (ZITHROMAX) 250 MG tablet Take 1 tablet by mouth See Admin Instructions for 5 days 500mg on day 1 followed by 250mg on days 2 - 5 6 tablet 0    omeprazole (PRILOSEC) 20 MG delayed release capsule       ondansetron (ZOFRAN ODT) 4 MG disintegrating tablet Take 1 tablet by mouth every 8 hours as needed for Nausea or Vomiting 10 tablet 0    clobetasol (TEMOVATE) 0.05 % cream Apply topically 2 times daily. 60 g 0    triamcinolone (ARISTOCORT) 0.5 % cream APPLY TO AFFECTED AREA(S) THREE TIMES A DAY 30 g 0    sertraline (ZOLOFT) 100 MG tablet TAKE TWO TABLETS BY MOUTH DAILY 60 tablet 4    hydrOXYzine (ATARAX) 50 MG tablet Take 1 tablet by mouth every 8 hours as needed for Anxiety 90 tablet 5    omeprazole (PRILOSEC) 40 MG delayed release capsule Take 1 capsule by mouth every morning (before breakfast) 90 capsule 1    azelastine (ASTELIN) 0.1 % nasal spray 2 sprays by Nasal route 2 times daily Use in each nostril as directed 60 mL 0    Plecanatide 3 MG TABS Take 3 mg by mouth daily      vitamin D3 (CHOLECALCIFEROL) 25 MCG (1000 UT) TABS tablet Take 1 tablet by mouth daily 30 tablet 2    LOW-OGESTREL 0.3-30 MG-MCG per tablet TAKE ONE TABLET BY MOUTH DAILY 28 tablet 5    albuterol sulfate HFA (VENTOLIN HFA) 108 (90 Base) MCG/ACT inhaler Inhale 2 puffs into the lungs every 6 hours as needed for Wheezing 1 Inhaler 2     No current facility-administered medications for this visit. Allergies   Allergen Reactions    Prednisone Shortness Of Breath       Review of Systems:     Review of Systems   Constitutional: Negative. Negative for activity change, appetite change, chills, diaphoresis, fatigue, fever and unexpected weight change. HENT: Positive for congestion, dental problem, ear pain, sinus pressure and sore throat. Negative for drooling, ear discharge, facial swelling, hearing loss, hoarse voice, mouth sores, nosebleeds, postnasal drip, rhinorrhea, sinus pain, sneezing, tinnitus, trouble swallowing and voice change. Eyes: Negative. Negative for photophobia, pain, discharge, redness, itching and visual disturbance. Respiratory: Positive for cough (dry). Negative for apnea, choking, chest tightness, shortness of breath, wheezing and stridor. Cardiovascular: Negative. Negative for chest pain, palpitations and leg swelling. Gastrointestinal: Negative.   Negative for abdominal distention, abdominal pain, anal bleeding, blood in stool, constipation, diarrhea, nausea, rectal pain and vomiting. Musculoskeletal: Negative. Negative for arthralgias, back pain, gait problem, joint swelling, myalgias, neck pain and neck stiffness. Skin: Negative. Negative for color change, pallor, rash and wound. Neurological: Positive for headaches. Negative for dizziness, tremors, seizures, syncope, facial asymmetry, speech difficulty, weakness, light-headedness and numbness. Physical Exam:      /82 (Site: Left Upper Arm, Position: Sitting, Cuff Size: Large Adult)   Pulse 98   Temp 98.4 °F (36.9 °C) (Tympanic)   Ht 5' 10\" (1.778 m)   Wt 254 lb (115.2 kg)   SpO2 98%   Breastfeeding No   BMI 36.45 kg/m²     Physical Exam  Vitals reviewed. Constitutional:       Appearance: Normal appearance. She is normal weight. HENT:      Head: Normocephalic and atraumatic. Right Ear: Tympanic membrane, ear canal and external ear normal.      Left Ear: Tympanic membrane, ear canal and external ear normal.      Nose: Congestion present. No rhinorrhea. Right Sinus: Frontal sinus tenderness present. No maxillary sinus tenderness. Left Sinus: Frontal sinus tenderness present. No maxillary sinus tenderness. Mouth/Throat:      Lips: Pink. Mouth: Mucous membranes are moist.      Pharynx: Uvula midline. Oropharyngeal exudate (post nasal drainage) and posterior oropharyngeal erythema present. No pharyngeal swelling or uvula swelling. Tonsils: No tonsillar exudate or tonsillar abscesses. Eyes:      Extraocular Movements: Extraocular movements intact. Conjunctiva/sclera: Conjunctivae normal.      Pupils: Pupils are equal, round, and reactive to light. Cardiovascular:      Rate and Rhythm: Normal rate and regular rhythm. Pulses: Normal pulses. Heart sounds: Normal heart sounds.    Pulmonary:      Effort: Pulmonary effort is normal.      Breath sounds: Normal breath sounds and air entry. Abdominal:      General: Abdomen is flat. Bowel sounds are normal.      Palpations: Abdomen is soft. Musculoskeletal:         General: Normal range of motion. Cervical back: Normal range of motion and neck supple. Lymphadenopathy:      Cervical: Cervical adenopathy present. Skin:     General: Skin is warm and dry. Capillary Refill: Capillary refill takes less than 2 seconds. Neurological:      General: No focal deficit present. Mental Status: She is alert and oriented to person, place, and time. Mental status is at baseline. Psychiatric:         Mood and Affect: Mood normal.         Behavior: Behavior normal.         Plan:          1. Acute bacterial sinusitis  -     azithromycin (ZITHROMAX) 250 MG tablet; Take 1 tablet by mouth See Admin Instructions for 5 days 500mg on day 1 followed by 250mg on days 2 - 5, Disp-6 tablet, R-0Normal  2. H/O atopic dermatitis  -     Kyle Alvarez MD, Dermatology, [place]     Referral to derm placed for atopic dermatitis. Continue over-the-counter medications as needed for symptoms. Use honey to the back of throat, salt water gargle as needed for sore throat, cough. Go to the ER for shortness of breath, chest pain. Patient verbalized understanding. Follow Up Instructions:      Return if symptoms worsen or fail to improve, for SOB, chest pain go to ER. Orders Placed This Encounter   Medications    azithromycin (ZITHROMAX) 250 MG tablet     Sig: Take 1 tablet by mouth See Admin Instructions for 5 days 500mg on day 1 followed by 250mg on days 2 - 5     Dispense:  6 tablet     Refill:  0           Based on the duration and severity of the symptoms-- I will treat this as bacterial at this time. Patient instructed to complete antibiotic prescription fully. May use Motrin/Tylenol for fever/pain. Saline washes, salt water gargles and over the counter preparations if desired. Patient agreeable to treatment plan.   Educational materials provided on AVS.  Follow up if symptoms do not improve/worsen. Patient and/or parent given educational materials - see patient instructions. Discussed use, benefit, and side effects of prescribed medications. All patient questions answered. Patient and/or parent voiced understanding.       Electronically signed by URIEL Bullard 5/18/2022 at 5:08 PM

## 2022-05-18 NOTE — PATIENT INSTRUCTIONS
Patient Education        Sinusitis: Care Instructions  Your Care Instructions     Sinusitis is an infection of the lining of the sinus cavities in your head. Sinusitis often follows a cold. It causes pain and pressure in your head andface. In most cases, sinusitis gets better on its own in 1 to 2 weeks. But some mildsymptoms may last for several weeks. Sometimes antibiotics are needed. Follow-up care is a key part of your treatment and safety. Be sure to make and go to all appointments, and call your doctor if you are having problems. It's also a good idea to know your test results and keep alist of the medicines you take. How can you care for yourself at home?  Take an over-the-counter pain medicine, such as acetaminophen (Tylenol), ibuprofen (Advil, Motrin), or naproxen (Aleve). Read and follow all instructions on the label.  If the doctor prescribed antibiotics, take them as directed. Do not stop taking them just because you feel better. You need to take the full course of antibiotics.  Be careful when taking over-the-counter cold or flu medicines and Tylenol at the same time. Many of these medicines have acetaminophen, which is Tylenol. Read the labels to make sure that you are not taking more than the recommended dose. Too much acetaminophen (Tylenol) can be harmful.  Breathe warm, moist air from a steamy shower, a hot bath, or a sink filled with hot water. Avoid cold, dry air. Using a humidifier in your home may help. Follow the directions for cleaning the machine.  Use saline (saltwater) nasal washes. This can help keep your nasal passages open and wash out mucus and bacteria. You can buy saline nose drops at a grocery store or drugstore. Or you can make your own at home by adding 1 teaspoon of salt and 1 teaspoon of baking soda to 2 cups of distilled water. If you make your own, fill a bulb syringe with the solution, insert the tip into your nostril, and squeeze gently. Jerral Butt your nose.    Put a hot, wet towel or a warm gel pack on your face 3 or 4 times a day for 5 to 10 minutes each time.  Try a decongestant nasal spray like oxymetazoline (Afrin). Do not use it for more than 3 days in a row. Using it for more than 3 days can make your congestion worse. When should you call for help? Call your doctor now or seek immediate medical care if:     You have new or worse swelling or redness in your face or around your eyes.      You have a new or higher fever. Watch closely for changes in your health, and be sure to contact your doctor if:     You have new or worse facial pain.      The mucus from your nose becomes thicker (like pus) or has new blood in it.      You are not getting better as expected. Where can you learn more? Go to https://[employer] [email or web address].[employer]. org and sign in to your [employer] account. Enter F120 in the [employer] box to learn more about \"Sinusitis: Care Instructions. \"     If you do not have an account, please click on the \"Sign Up Now\" link. Current as of: September 8, 2021               Content Version: 13.2  © 2006-2022 Healthwise, Incorporated. Care instructions adapted under license by [facility] [facility] If you have questions about a medical condition or this instruction, always ask your healthcare professional. [address] any warranty or liability for your use of this information.

## 2022-05-18 NOTE — LETTER
[address] [address]  Phone: [phone]  Fax: [phone]    URIEL Paulino CNP        May 18, 2022     Patient: Juan Christensen   YOB: 2000   Date of Visit: 5/18/2022       To Whom It May Concern: It is my medical opinion that Juan Christensen be excused 5/18/2022 due to medical illness. If you have any questions or concerns, please don't hesitate to call.     Sincerely,        URIEL Pualino CNP

## 2022-05-25 ENCOUNTER — NURSE TRIAGE (OUTPATIENT)
Dept: OTHER | Facility: CLINIC | Age: 22
End: 2022-05-25

## 2022-05-25 ENCOUNTER — HOSPITAL ENCOUNTER (EMERGENCY)
Age: 22
Discharge: HOME | End: 2022-05-25
Attending: EMERGENCY MEDICINE
Payer: COMMERCIAL

## 2022-05-25 ENCOUNTER — APPOINTMENT (OUTPATIENT)
Dept: GENERAL RADIOLOGY | Age: 22
End: 2022-05-25
Payer: COMMERCIAL

## 2022-05-25 VITALS
BODY MASS INDEX: 36.45 KG/M2 | SYSTOLIC BLOOD PRESSURE: 136 MMHG | DIASTOLIC BLOOD PRESSURE: 88 MMHG | HEART RATE: 84 BPM | OXYGEN SATURATION: 99 % | RESPIRATION RATE: 16 BRPM | TEMPERATURE: 98 F | WEIGHT: 254 LBS

## 2022-05-25 DIAGNOSIS — R42 DIZZINESS: Primary | ICD-10-CM

## 2022-05-25 DIAGNOSIS — R06.02 SHORTNESS OF BREATH: ICD-10-CM

## 2022-05-25 DIAGNOSIS — B34.9 VIRAL ILLNESS: ICD-10-CM

## 2022-05-25 DIAGNOSIS — R19.7 DIARRHEA, UNSPECIFIED TYPE: ICD-10-CM

## 2022-05-25 LAB
-: ABNORMAL
ABSOLUTE EOS #: 0.04 K/UL (ref 0–0.44)
ABSOLUTE IMMATURE GRANULOCYTE: 0.01 K/UL (ref 0–0.3)
ABSOLUTE LYMPH #: 1.58 K/UL (ref 1.2–5.2)
ABSOLUTE MONO #: 0.61 K/UL (ref 0.1–1.4)
AMORPHOUS: ABNORMAL
ANION GAP SERPL CALCULATED.3IONS-SCNC: 8 MMOL/L (ref 9–17)
BACTERIA: ABNORMAL
BASOPHILS # BLD: 0 % (ref 0–2)
BASOPHILS ABSOLUTE: 0.03 K/UL (ref 0–0.2)
BILIRUBIN URINE: NEGATIVE
BUN BLDV-MCNC: 11 MG/DL (ref 6–20)
BUN/CREAT BLD: 14 (ref 9–20)
CALCIUM SERPL-MCNC: 9.7 MG/DL (ref 8.6–10.4)
CHLORIDE BLD-SCNC: 105 MMOL/L (ref 98–107)
CO2: 26 MMOL/L (ref 20–31)
COLOR: YELLOW
CREAT SERPL-MCNC: 0.78 MG/DL (ref 0.5–0.9)
EOSINOPHILS RELATIVE PERCENT: 1 % (ref 1–4)
EPITHELIAL CELLS UA: ABNORMAL /HPF (ref 0–5)
GFR AFRICAN AMERICAN: >60 ML/MIN
GFR NON-AFRICAN AMERICAN: >60 ML/MIN
GFR SERPL CREATININE-BSD FRML MDRD: ABNORMAL ML/MIN/{1.73_M2}
GLUCOSE BLD-MCNC: 107 MG/DL (ref 70–99)
GLUCOSE URINE: NEGATIVE
HCG(URINE) PREGNANCY TEST: NEGATIVE
HCT VFR BLD CALC: 42.5 % (ref 36.3–47.1)
HEMOGLOBIN: 13.3 G/DL (ref 11.9–15.1)
IMMATURE GRANULOCYTES: 0 %
KETONES, URINE: NEGATIVE
LEUKOCYTE ESTERASE, URINE: NEGATIVE
LYMPHOCYTES # BLD: 20 % (ref 25–45)
MAGNESIUM: 2 MG/DL (ref 1.6–2.6)
MCH RBC QN AUTO: 25.6 PG (ref 25.2–33.5)
MCHC RBC AUTO-ENTMCNC: 31.3 G/DL (ref 28.4–34.8)
MCV RBC AUTO: 81.9 FL (ref 82.6–102.9)
MONOCYTES # BLD: 8 % (ref 2–8)
MUCUS: ABNORMAL
NITRITE, URINE: NEGATIVE
PDW BLD-RTO: 13.1 % (ref 11.8–14.4)
PH UA: 8 (ref 5–8)
PLATELET # BLD: 258 K/UL (ref 138–453)
PMV BLD AUTO: 8.9 FL (ref 8.1–13.5)
POTASSIUM SERPL-SCNC: 4.4 MMOL/L (ref 3.7–5.3)
PROTEIN UA: NEGATIVE
RBC # BLD: 5.19 M/UL (ref 3.95–5.11)
RBC # BLD: ABNORMAL 10*6/UL
RBC UA: ABNORMAL /HPF (ref 0–2)
SARS-COV-2, RAPID: NOT DETECTED
SEG NEUTROPHILS: 71 % (ref 34–64)
SEGMENTED NEUTROPHILS ABSOLUTE COUNT: 5.59 K/UL (ref 1.8–8)
SODIUM BLD-SCNC: 139 MMOL/L (ref 135–144)
SPECIFIC GRAVITY UA: 1.02 (ref 1–1.03)
SPECIMEN DESCRIPTION: NORMAL
TURBIDITY: ABNORMAL
URINE HGB: NEGATIVE
UROBILINOGEN, URINE: NORMAL
WBC # BLD: 7.9 K/UL (ref 4.5–13.5)
WBC UA: ABNORMAL /HPF (ref 0–5)

## 2022-05-25 PROCEDURE — 81025 URINE PREGNANCY TEST: CPT

## 2022-05-25 PROCEDURE — 83735 ASSAY OF MAGNESIUM: CPT

## 2022-05-25 PROCEDURE — 99285 EMERGENCY DEPT VISIT HI MDM: CPT

## 2022-05-25 PROCEDURE — 80048 BASIC METABOLIC PNL TOTAL CA: CPT

## 2022-05-25 PROCEDURE — 71045 X-RAY EXAM CHEST 1 VIEW: CPT

## 2022-05-25 PROCEDURE — 93005 ELECTROCARDIOGRAM TRACING: CPT | Performed by: NURSE PRACTITIONER

## 2022-05-25 PROCEDURE — 81001 URINALYSIS AUTO W/SCOPE: CPT

## 2022-05-25 PROCEDURE — 85025 COMPLETE CBC W/AUTO DIFF WBC: CPT

## 2022-05-25 PROCEDURE — 87635 SARS-COV-2 COVID-19 AMP PRB: CPT

## 2022-05-25 RX ORDER — MECLIZINE HYDROCHLORIDE 25 MG/1
25 TABLET ORAL 3 TIMES DAILY PRN
Qty: 15 TABLET | Refills: 0 | Status: SHIPPED | OUTPATIENT
Start: 2022-05-25

## 2022-05-25 ASSESSMENT — PAIN SCALES - GENERAL: PAINLEVEL_OUTOF10: 5

## 2022-05-25 ASSESSMENT — ENCOUNTER SYMPTOMS
PHOTOPHOBIA: 0
EYE PAIN: 0
RHINORRHEA: 1
ABDOMINAL PAIN: 0
SHORTNESS OF BREATH: 1
BACK PAIN: 0
DIARRHEA: 1
COUGH: 1
COLOR CHANGE: 0
SORE THROAT: 0

## 2022-05-25 ASSESSMENT — PAIN - FUNCTIONAL ASSESSMENT: PAIN_FUNCTIONAL_ASSESSMENT: 0-10

## 2022-05-25 NOTE — TELEPHONE ENCOUNTER
Received call from [facility] with [employer]. Subjective: Caller states \"dizziness\"     Current Symptoms: very dizzy,  Nausea, head pain. Lightheaded. Denies feeling like she is spinning. Gets hot flashes as well. Diarrhea off and on for 2 days, one day, the other day 3. Is trying to drink more water. Neurocardiogenic syncope. Onset: 4 days ago; sudden, intermittent, waxing and waning    Pain Severity: 4/10; squeezing; constant    Temperature: denies by parent's tactile estimate    What has been tried: was at urgent care for a week, put on abx for ENT/ cold sx. LMP: about a month Pregnant: No    Recommended disposition: See PCP within 24 Hours    Care advice provided, patient verbalizes understanding; denies any other questions or concerns; instructed to call back for any new or worsening symptoms. Patient/Caller agrees with recommended disposition; writer provided warm transfer to [address] [place] [address] at [facility] for appointment scheduling     Attention Provider: Thank you for allowing me to participate in the care of your patient. The patient was connected to triage in response to information provided to the ECC/PSC. Please do not respond through this encounter as the response is not directed to a shared pool.            Reason for Disposition   [1] MODERATE dizziness (e.g., interferes with normal activities) AND [2] has NOT been evaluated by physician for this  (Exception: dizziness caused by heat exposure, sudden standing, or poor fluid intake)    Protocols used: [facility]

## 2022-05-25 NOTE — ED PROVIDER NOTES
Team [address] ED  eMERGENCY dEPARTMENT eNCOUnter      Pt Name: Marge Poe  MRN: [id number]  [place] 2000  Date of evaluation: 5/25/2022  Provider: URIEL Samano [id number]       Chief Complaint   Patient presents with    Dizziness     seen @  approx. x1 week ago given AXB not helping    Shortness of Breath         HISTORY OF PRESENT ILLNESS  (Location/Symptom, Timing/Onset, Context/Setting, Quality, Duration, Modifying Factors, Severity.)   Marge Poe is a 22 y.o. female who presents to the emergency department via private auto. Reports dizziness, SOB, cough, diarrhea for a few days. Last week she had sinus congestion/drainage/pressure. She completed 7 days of amoxicillin. Denies fever, chills, weakness, emesis, vision changes. Denies chance of pregnancy. Rates her pain 5/10 at this time. Nursing Notes were reviewed. ALLERGIES     Prednisone    CURRENT MEDICATIONS       Previous Medications    ALBUTEROL SULFATE HFA (VENTOLIN HFA) 108 (90 BASE) MCG/ACT INHALER    Inhale 2 puffs into the lungs every 6 hours as needed for Wheezing    AZELASTINE (ASTELIN) 0.1 % NASAL SPRAY    2 sprays by Nasal route 2 times daily Use in each nostril as directed    CLOBETASOL (TEMOVATE) 0.05 % CREAM    Apply topically 2 times daily.     HYDROXYZINE (ATARAX) 50 MG TABLET    Take 1 tablet by mouth every 8 hours as needed for Anxiety    LOW-OGESTREL 0.3-30 MG-MCG PER TABLET    TAKE ONE TABLET BY MOUTH DAILY    OMEPRAZOLE (PRILOSEC) 20 MG DELAYED RELEASE CAPSULE        OMEPRAZOLE (PRILOSEC) 40 MG DELAYED RELEASE CAPSULE    Take 1 capsule by mouth every morning (before breakfast)    ONDANSETRON (ZOFRAN ODT) 4 MG DISINTEGRATING TABLET    Take 1 tablet by mouth every 8 hours as needed for Nausea or Vomiting    PLECANATIDE 3 MG TABS    Take 3 mg by mouth daily    SERTRALINE (ZOLOFT) 100 MG TABLET    TAKE TWO TABLETS BY MOUTH DAILY    TRIAMCINOLONE (ARISTOCORT) 0.5 % CREAM    APPLY TO AFFECTED AREA(S) THREE TIMES A DAY    VITAMIN D3 (CHOLECALCIFEROL) 25 MCG (1000 UT) TABS TABLET    Take 1 tablet by mouth daily       PAST MEDICAL HISTORY         Diagnosis Date    Allergic     Anxiety     Asthma     Cyst of ovary, right     Eczema     Headache     Hypoglycemia     Neurocardiogenic syncope     Pneumonia     Reflux esophagitis     Seizures (HCC)        SURGICAL HISTORY           Procedure Laterality Date    CHEST TUBE INSERTION Left 2006    NC EGD TRANSORAL BIOPSY SINGLE/MULTIPLE N/A 5/31/2018    EGD BIOPSY - GI SCHEDULED performed by Sarah Myrick MD at [place] [address]           Problem Relation Age of Onset    Diabetes Other     Irritable Bowel Syndrome Other     Migraines Other     Thyroid Disease Other     Other Other         Gallstones, Constipation, Intestinal Polyps, Lactose intolerance, stomach ulcers    Fainting Mother     Migraines Mother     High Blood Pressure Father     Depression Father     Anxiety Disorder Father     High Blood Pressure Sister     Migraines Sister     Depression Sister     No Known Problems Brother     No Known Problems Sister      Family Status   Relation Name Status    Other  (Not Specified)    Mother  Alive    Father  Alive    Sister  Alive    Brother  Alive    Sister  [address]      reports that she has never smoked. She has never used smokeless tobacco. She reports that she does not drink alcohol and does not use drugs. REVIEW OF SYSTEMS    (2-9 systems for level 4, 10 or more for level 5)     Review of Systems   Constitutional: Positive for fatigue. Negative for chills, diaphoresis and fever. HENT: Positive for congestion and rhinorrhea. Negative for ear discharge, ear pain and sore throat. Eyes: Negative for photophobia, pain and visual disturbance. Respiratory: Positive for cough and shortness of breath. Cardiovascular: Negative for chest pain.    Gastrointestinal: Positive for are intact. Motor: Motor function is intact. Coordination: Coordination is intact. Psychiatric:         Behavior: Behavior normal.           DIAGNOSTIC RESULTS     RADIOLOGY:   Non-plain film images such as CT, Ultrasound and MRI are read by the radiologist. Plain radiographic images are visualized and preliminarily interpreted by the emergency physician with the below findings:    Interpretation per the Radiologist below, if available at the time of this note:    XR CHEST PORTABLE    Result Date: 5/25/2022  EXAMINATION: [address] 5/25/2022 6:40 pm COMPARISON: None. HISTORY: ORDERING SYSTEM PROVIDED HISTORY: SOB TECHNOLOGIST PROVIDED HISTORY: SOB Reason for Exam: SOB, dizziness FINDINGS: No acute airspace infiltrate. No pneumothorax or pleural effusion. Normal cardiomediastinal silhouette. No acute cardiopulmonary findings. LABS:  Labs Reviewed   URINALYSIS WITH MICROSCOPIC - Abnormal; Notable for the following components:       Result Value    Turbidity UA Cloudy (*)     Bacteria, UA MODERATE (*)     Mucus, UA 3+ (*)     Amorphous, UA 2+ (*)     All other components within normal limits   BASIC METABOLIC PANEL - Abnormal; Notable for the following components:    Glucose 107 (*)     Anion Gap 8 (*)     All other components within normal limits   CBC WITH AUTO DIFFERENTIAL - Abnormal; Notable for the following components:    RBC 5.19 (*)     MCV 81.9 (*)     Seg Neutrophils 71 (*)     Lymphocytes 20 (*)     All other components within normal limits   COVID-19, RAPID   PREGNANCY, URINE   MAGNESIUM       All other labs were within normal range or not returned as of this dictation.     EMERGENCY DEPARTMENT COURSE and DIFFERENTIAL DIAGNOSIS/MDM:   Vitals:    Vitals:    05/25/22 1656 05/25/22 1657 05/25/22 1836   BP: (!) 132/101  136/88   Pulse: (!) 125  84   Resp: 22  16   Temp: 98 °F (36.7 °C)     TempSrc: Oral     SpO2: 97%  99%   Weight:  254 lb (115.2 kg)        CLINICAL DECISION MAKING:  The patient presented alert with a nontoxic appearance and was seen in conjunction with Dr. Renee Rodriguez. Testing was negative for acute findings. She was encouraged to follow up with her pcp for a recheck, further evaluation and treatment. A prescription was written for Antivert. Evaluation and treatment course in the ED, and plan of care upon discharge was discussed in length with the patient. Patient had no further questions prior to being discharged and was instructed to return to the ED for new or worsening symptoms. Care was provided during an unprecedented national emergency due to the novel coronavirus, Covid-19. FINAL IMPRESSION      1. Dizziness    2. Viral illness    3. Diarrhea, unspecified type    4. Shortness of breath            Problem List  Patient Active Problem List   Diagnosis Code    Dysautonomia ([facility] [address] G90.1    Chronic generalized abdominal pain R10.84, G89.29    Intermittent vomiting R11.10    Gastroesophageal reflux disease without esophagitis K21.9    Anxiety F41.9    Chronic tension-type headache, intractable G44.221    Depression F32. 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 ED  [address] [facility]  [phone]    If symptoms worsen, As needed      DISCHARGE MEDICATIONS:     New Prescriptions    MECLIZINE (ANTIVERT) 25 MG TABLET    Take 1 tablet by mouth 3 times daily as needed for Dizziness or Nausea (Please note that portions of this note were completed with a voice recognition program.  Efforts were made to edit the dictations but occasionally words are mis-transcribed.)    URIEL Dueñas CNP, APRN - CNP  05/25/22 0111

## 2022-05-25 NOTE — LETTER
[facility] ED  [address] [address]  Phone: [phone]             May 25, 2022    Patient: Aurelio Solorzano   YOB: 2000   Date of Visit: 5/25/2022       To Whom It May Concern:    Aurelio Solorzano was seen and treated in our emergency department on 5/25/2022. Please excuse her from work 5/25/22 and 5/26/22.       Sincerely,             Signature:__________________________________

## 2022-05-25 NOTE — ED NOTES
Pt reports onset last week of URI symptoms for which she went to urgent care and was given amoxicillin. States symptoms have not improved and she is having severe dizziness and nausea. Reports episodes of diarrhea intermittently as well. States she has missed work for illness.       Cyndee Camacho RN  05/25/22 5848

## 2022-05-26 ENCOUNTER — OFFICE VISIT (OUTPATIENT)
Dept: FAMILY MEDICINE CLINIC | Age: 22
End: 2022-05-26
Payer: COMMERCIAL

## 2022-05-26 VITALS
OXYGEN SATURATION: 99 % | DIASTOLIC BLOOD PRESSURE: 82 MMHG | SYSTOLIC BLOOD PRESSURE: 134 MMHG | HEART RATE: 64 BPM | HEIGHT: 70 IN | WEIGHT: 241 LBS | TEMPERATURE: 97.4 F | BODY MASS INDEX: 34.5 KG/M2

## 2022-05-26 DIAGNOSIS — R82.90 ABNORMAL URINALYSIS: Primary | ICD-10-CM

## 2022-05-26 DIAGNOSIS — L30.9 ECZEMA, UNSPECIFIED TYPE: ICD-10-CM

## 2022-05-26 DIAGNOSIS — B34.9 VIRAL ILLNESS: ICD-10-CM

## 2022-05-26 LAB
BILIRUBIN, POC: ABNORMAL
BLOOD URINE, POC: ABNORMAL
CLARITY, POC: CLEAR
COLOR, POC: YELLOW
EKG ATRIAL RATE: 98 BPM
EKG P AXIS: 27 DEGREES
EKG P-R INTERVAL: 170 MS
EKG Q-T INTERVAL: 340 MS
EKG QRS DURATION: 96 MS
EKG QTC CALCULATION (BAZETT): 434 MS
EKG R AXIS: 35 DEGREES
EKG T AXIS: 15 DEGREES
EKG VENTRICULAR RATE: 98 BPM
GLUCOSE URINE, POC: ABNORMAL
KETONES, POC: ABNORMAL
LEUKOCYTE EST, POC: ABNORMAL
NITRITE, POC: ABNORMAL
PH, POC: 7
PROTEIN, POC: ABNORMAL
SPECIFIC GRAVITY, POC: 1.02
UROBILINOGEN, POC: 0.2

## 2022-05-26 PROCEDURE — 81002 URINALYSIS NONAUTO W/O SCOPE: CPT | Performed by: PHYSICIAN ASSISTANT

## 2022-05-26 PROCEDURE — 99213 OFFICE O/P EST LOW 20 MIN: CPT | Performed by: PHYSICIAN ASSISTANT

## 2022-05-26 RX ORDER — TRIAMCINOLONE ACETONIDE 1 MG/G
CREAM TOPICAL
Qty: 30 G | Refills: 0 | Status: SHIPPED | OUTPATIENT
Start: 2022-05-26

## 2022-05-26 ASSESSMENT — ENCOUNTER SYMPTOMS
CONSTIPATION: 0
SHORTNESS OF BREATH: 0
VOMITING: 1
ABDOMINAL PAIN: 0
COUGH: 0
COLOR CHANGE: 0
NAUSEA: 1
DIARRHEA: 0

## 2022-05-26 NOTE — PROGRESS NOTES
Visit Information    Have you changed or started any medications since your last visit including any over-the-counter medicines, vitamins, or herbal medicines? no   Are you having any side effects from any of your medications? -  no  Have you stopped taking any of your medications? Is so, why? -  no    Have you seen any other physician or provider since your last visit? No  Have you had any other diagnostic tests since your last visit? No  Have you been seen in the emergency room and/or had an admission to a hospital since we last saw you? No  Have you had your routine dental cleaning in the past 6 months? no    Have you activated your [employer] account? If not, what are your barriers?  Yes     Patient Care Team:  URIEL Ortiz CNP as PCP - General (Certified Nurse Practitioner)  URIEL Ortiz CNP as PCP - [facility]    Medical History Review  Past Medical, Family, and Social History reviewed and  contribute to the patient presenting condition    Health Maintenance   Topic Date Due    COVID-19 Vaccine (1) Never done    HIV screen  Never done    Chlamydia screen  Never done    Hepatitis C screen  Never done    Flu vaccine (Season Ended) 09/01/2022    Depression Monitoring  01/08/2023    DTaP/Tdap/Td vaccine (7 - Td or Tdap) 06/04/2023    Hepatitis A vaccine  Completed    Hepatitis B vaccine  Completed    Hib vaccine  Completed    HPV vaccine  Completed    Varicella vaccine  Completed    Meningococcal (ACWY) vaccine  Aged Out    Pneumococcal 0-64 years Vaccine  Aged Out

## 2022-05-26 NOTE — PROGRESS NOTES
[address] [facility]  [address] [address]  Dept: [phone]  Dept Fax: [phone]    Aparna Blackman is a 22 y.o. female who presents today for her medical conditions/complaintsas noted below. Aparna Blackman is c/o of   Chief Complaint   Patient presents with    Dizziness    Follow-Up from [facility] ER yesterday- discuss labs from ER    Eczema     discuss a different         HPI:     HPI     Patient is here for follow-up after ER visit yesterday. She was seen for concern of dizziness, nausea and SOB. She had been treated prior for sinus congestion with 7 days of amoxil. Dizziness and SOB persisted  Work up included labs, UA and covid testing. Bacteria noted in UA, I do not see culture ordered. She is not presently on an antibiotic. covid test negative  CXR also ran and was negative  Patient states she does have anxiety that occasionally causes similar symptoms  Has been eating a simple diet, drinking fluids well.  Feeling a little better today    No results found for: LABA1C          ( goal A1Cis < 7)   No results found for: LABMICR  LDL Calculated (mg/dL)   Date Value   06/09/2021 151 (A)       (goal LDL is <100)   AST (U/L)   Date Value   06/09/2021 13     ALT (U/L)   Date Value   06/09/2021 16     BUN (mg/dL)   Date Value   05/25/2022 11     BP Readings from Last 3 Encounters:   05/26/22 134/82   05/25/22 136/88   05/18/22 137/82          (goal 120/80)    Past Medical History:   Diagnosis Date    Allergic     Anxiety     Asthma     Cyst of ovary, right     Eczema     Headache     Hypoglycemia     Neurocardiogenic syncope     Pneumonia     Reflux esophagitis     Seizures (HCC)       Past Surgical History:   Procedure Laterality Date    CHEST TUBE INSERTION Left 2006    [facility]MULTIPLE N/A 5/31/2018    EGD BIOPSY - GI SCHEDULED performed by Gordo Mackey MD at [place] History   Problem Relation Age of Onset    Diabetes Other     Irritable Bowel Syndrome Other     Migraines Other     Thyroid Disease Other     Other Other         Gallstones, Constipation, Intestinal Polyps, Lactose intolerance, stomach ulcers    Fainting Mother     Migraines Mother     High Blood Pressure Father     Depression Father     Anxiety Disorder Father     High Blood Pressure Sister     Migraines Sister     Depression Sister     No Known Problems Brother     No Known Problems Sister        Social History     Tobacco Use    Smoking status: Never Smoker    Smokeless tobacco: Never Used   Substance Use Topics    Alcohol use: No      Current Outpatient Medications   Medication Sig Dispense Refill    triamcinolone (KENALOG) 0.1 % cream Apply topically 2 times daily.  30 g 0    meclizine (ANTIVERT) 25 MG tablet Take 1 tablet by mouth 3 times daily as needed for Dizziness or Nausea 15 tablet 0    omeprazole (PRILOSEC) 20 MG delayed release capsule       sertraline (ZOLOFT) 100 MG tablet TAKE TWO TABLETS BY MOUTH DAILY 60 tablet 4    hydrOXYzine (ATARAX) 50 MG tablet Take 1 tablet by mouth every 8 hours as needed for Anxiety 90 tablet 5    omeprazole (PRILOSEC) 40 MG delayed release capsule Take 1 capsule by mouth every morning (before breakfast) 90 capsule 1    azelastine (ASTELIN) 0.1 % nasal spray 2 sprays by Nasal route 2 times daily Use in each nostril as directed 60 mL 0    vitamin D3 (CHOLECALCIFEROL) 25 MCG (1000 UT) TABS tablet Take 1 tablet by mouth daily 30 tablet 2    albuterol sulfate HFA (VENTOLIN HFA) 108 (90 Base) MCG/ACT inhaler Inhale 2 puffs into the lungs every 6 hours as needed for Wheezing 1 Inhaler 2    ondansetron (ZOFRAN ODT) 4 MG disintegrating tablet Take 1 tablet by mouth every 8 hours as needed for Nausea or Vomiting (Patient not taking: Reported on 5/26/2022) 10 tablet 0    Plecanatide 3 MG TABS Take 3 mg by mouth daily (Patient not taking: Reported on 5/26/2022)      LOW-OGESTREL 0.3-30 MG-MCG per tablet TAKE ONE TABLET BY MOUTH DAILY (Patient not taking: Reported on 5/26/2022) 28 tablet 5     No current facility-administered medications for this visit. Allergies   Allergen Reactions    Prednisone Shortness Of Breath       Health Maintenance   Topic Date Due    COVID-19 Vaccine (1) Never done    HIV screen  Never done    Chlamydia screen  Never done    Hepatitis C screen  Never done    Flu vaccine (Season Ended) 09/01/2022    Depression Monitoring  01/08/2023    DTaP/Tdap/Td vaccine (7 - Td or Tdap) 06/04/2023    Hepatitis A vaccine  Completed    Hepatitis B vaccine  Completed    Hib vaccine  Completed    HPV vaccine  Completed    Varicella vaccine  Completed    Meningococcal (ACWY) vaccine  Aged Out    Pneumococcal 0-64 years Vaccine  Aged Out       Subjective:     Review of Systems   Constitutional: Negative for activity change, appetite change, fatigue, fever and unexpected weight change. /82 (Site: Right Upper Arm, Position: Sitting, Cuff Size: Medium Adult)   Pulse 64   Temp 97.4 °F (36.3 °C) (Tympanic)   Ht 5' 10\" (1.778 m)   Wt 241 lb (109.3 kg)   SpO2 99%   BMI 34.58 kg/m²    Respiratory: Negative for cough and shortness of breath. Cardiovascular: Negative for chest pain. Gastrointestinal: Positive for nausea and vomiting. Negative for abdominal pain, constipation and diarrhea. Genitourinary: Negative for dysuria. Skin: Negative for color change, pallor, rash and wound. Neurological: Positive for dizziness. Negative for light-headedness and headaches. Hematological: Negative for adenopathy. Psychiatric/Behavioral: The patient is not nervous/anxious. Objective:     Physical Exam  Vitals and nursing note reviewed. Constitutional:       General: She is not in acute distress. Appearance: Normal appearance. She is well-developed. She is not ill-appearing.    HENT:      Head: Normocephalic and atraumatic. Right Ear: Tympanic membrane, ear canal and external ear normal. There is no impacted cerumen. Left Ear: Tympanic membrane, ear canal and external ear normal. There is no impacted cerumen. Nose: Nose normal. No congestion or rhinorrhea. Mouth/Throat:      Mouth: Mucous membranes are moist.      Pharynx: No oropharyngeal exudate or posterior oropharyngeal erythema. Cardiovascular:      Rate and Rhythm: Normal rate and regular rhythm. Heart sounds: No murmur heard. Pulmonary:      Effort: Pulmonary effort is normal. No respiratory distress. Breath sounds: Normal breath sounds. No wheezing, rhonchi or rales. Abdominal:      General: Bowel sounds are normal. There is no distension. Palpations: Abdomen is soft. There is no mass. Tenderness: There is no abdominal tenderness. There is no guarding or rebound. Musculoskeletal:      Cervical back: Neck supple. Skin:     General: Skin is warm and dry. Coloration: Skin is not pale. Findings: Rash (dry erythematous patchy rash on hands/web spaces) present. No erythema. Neurological:      Mental Status: She is alert and oriented to person, place, and time. Psychiatric:         Mood and Affect: Mood normal.         Behavior: Behavior normal.         Thought Content: Thought content normal.         Judgment: Judgment normal.       /82 (Site: Right Upper Arm, Position: Sitting, Cuff Size: Medium Adult)   Pulse 64   Temp 97.4 °F (36.3 °C) (Tympanic)   Ht 5' 10\" (1.778 m)   Wt 241 lb (109.3 kg)   SpO2 99%   BMI 34.58 kg/m²     Assessment:       Diagnosis Orders   1. Abnormal urinalysis  POCT Urinalysis no Micro   2. Viral illness     3. Eczema, unspecified type               Plan:      Return if symptoms worsen or fail to improve. ER work up reviewed  Repeat UA here today, negative  Suspect viral illness, seeing some improvement.  Extend off work note through tomorrow  Change topical triamcinolone to 1%, use sparingly. Also encouraged eczema lotion for hydration daily  Patient agreed with treatment plan    Orders Placed This Encounter   Procedures    POCT Urinalysis no Micro     Results for orders placed or performed in visit on 05/26/22   POCT Urinalysis no Micro   Result Value Ref Range    Color, UA yellow     Clarity, UA clear     Glucose, UA POC Neg     Bilirubin, UA Neg     Ketones, UA Trace     Spec Grav, UA 1.020     Blood, UA POC Neg     pH, UA 7.0     Protein, UA POC Trace     Urobilinogen, UA 0.2     Leukocytes, UA Neg     Nitrite, UA Neg          Patient given educational materials - see patient instructions. Discussed use, benefit, and side effects of prescribed medications. All patientquestions answered. Pt voiced understanding. Reviewed health maintenance. Instructedto continue current medications, diet and exercise. Patient agreed with treatmentplan. Follow up as directed. Please note that this chart was generated using voice recognition Dragon dictation software. Although every effort was made to ensure the accuracy of this automated transcription, some errors in transcription may have occurred.      Electronically signed by Dom Wynn PA-C on 5/26/2022 at 11:37 AM

## 2022-06-01 ENCOUNTER — TELEPHONE (OUTPATIENT)
Dept: FAMILY MEDICINE CLINIC | Age: 22
End: 2022-06-01

## 2022-06-01 NOTE — TELEPHONE ENCOUNTER
Patient called stating that she is still having a lot of congestion and sore throat. She does not know what to do and wanted to see what danica recommends.      Please advise

## 2022-06-01 NOTE — TELEPHONE ENCOUNTER
It appears she was seen 5.26 with Devorah which is more recent than my visit. Please tell her to follow-up with her.  Thanks

## 2022-06-12 ENCOUNTER — OFFICE VISIT (OUTPATIENT)
Dept: PRIMARY CARE CLINIC | Age: 22
End: 2022-06-12
Payer: COMMERCIAL

## 2022-06-12 VITALS
WEIGHT: 236 LBS | BODY MASS INDEX: 33.79 KG/M2 | HEART RATE: 115 BPM | TEMPERATURE: 98.6 F | SYSTOLIC BLOOD PRESSURE: 128 MMHG | OXYGEN SATURATION: 98 % | DIASTOLIC BLOOD PRESSURE: 88 MMHG | HEIGHT: 70 IN

## 2022-06-12 DIAGNOSIS — U07.1 COVID: Primary | ICD-10-CM

## 2022-06-12 PROCEDURE — 99213 OFFICE O/P EST LOW 20 MIN: CPT | Performed by: NURSE PRACTITIONER

## 2022-06-12 RX ORDER — POLYMYXIN B SULFATE AND TRIMETHOPRIM 1; 10000 MG/ML; [USP'U]/ML
1 SOLUTION OPHTHALMIC EVERY 4 HOURS
Qty: 10 ML | Refills: 0 | Status: SHIPPED | OUTPATIENT
Start: 2022-06-12 | End: 2022-06-19

## 2022-06-12 RX ORDER — ONDANSETRON 4 MG/1
4 TABLET, FILM COATED ORAL EVERY 8 HOURS PRN
Qty: 30 TABLET | Refills: 0 | Status: SHIPPED | OUTPATIENT
Start: 2022-06-12

## 2022-06-12 RX ORDER — BENZONATATE 200 MG/1
200 CAPSULE ORAL 3 TIMES DAILY PRN
Qty: 30 CAPSULE | Refills: 0 | Status: SHIPPED | OUTPATIENT
Start: 2022-06-12 | End: 2022-06-19

## 2022-06-12 ASSESSMENT — ENCOUNTER SYMPTOMS
DIARRHEA: 0
SINUS PAIN: 0
NAUSEA: 1
COUGH: 0
VOMITING: 1
RHINORRHEA: 1
ABDOMINAL PAIN: 0
SORE THROAT: 0
SHORTNESS OF BREATH: 0

## 2022-06-12 NOTE — LETTER
[address] [address]  Phone: [phone]  Fax: [phone]    URIEL Navas CNP        June 12, 2022     Patient: Bonna Aase   YOB: 2000   Date of Visit: 6/12/2022       To Whom It May Concern: It is my medical opinion that Bonna Aase is excused through 6/19/22. If you have any questions or concerns, please don't hesitate to call.     Sincerely,        URIEL Navas CNP

## 2022-06-12 NOTE — PROGRESS NOTES
[address] [facility]  [address] [address]  Dept: [phone]  Dept Fax: [phone]    Keith Loza is a 22 y.o. female who presents today for her medicalconditions/complaints as noted below. Keith Loza is c/o of Cough (pt has been having cough and congestion nausea and vomiting )      HPI:         58-year-old female patient presents with complaints of cough congestion nausea. Patient reportedly tested positive for COVID-19 9 days ago. Patient describes generalized fatigue cough that is mostly dry. Additionally has some nausea and vomiting. Patient describes generalized fatigue. Treatments tried include none. Relieving factors include none. Past Medical History:   Diagnosis Date    Allergic     Anxiety     Asthma     Cyst of ovary, right     Eczema     Headache     Hypoglycemia     Neurocardiogenic syncope     Pneumonia     Reflux esophagitis     Seizures (HCC)         Current Outpatient Medications   Medication Sig Dispense Refill    ondansetron (ZOFRAN) 4 MG tablet Take 1 tablet by mouth every 8 hours as needed for Nausea or Vomiting (1- 2TABS EVERY 8 HOURS PRN) 30 tablet 0    benzonatate (TESSALON) 200 MG capsule Take 1 capsule by mouth 3 times daily as needed for Cough 30 capsule 0    trimethoprim-polymyxin b (POLYTRIM) 73366-0.1 UNIT/ML-% ophthalmic solution Place 1 drop into both eyes every 4 hours for 7 days 10 mL 0    triamcinolone (KENALOG) 0.1 % cream Apply topically 2 times daily.  30 g 0    meclizine (ANTIVERT) 25 MG tablet Take 1 tablet by mouth 3 times daily as needed for Dizziness or Nausea 15 tablet 0    omeprazole (PRILOSEC) 20 MG delayed release capsule       ondansetron (ZOFRAN ODT) 4 MG disintegrating tablet Take 1 tablet by mouth every 8 hours as needed for Nausea or Vomiting (Patient not taking: Reported on 5/26/2022) 10 tablet 0    sertraline (ZOLOFT) 100 MG tablet TAKE TWO TABLETS BY MOUTH DAILY 60 tablet 4    hydrOXYzine (ATARAX) 50 MG tablet Take 1 tablet by mouth every 8 hours as needed for Anxiety 90 tablet 5    omeprazole (PRILOSEC) 40 MG delayed release capsule Take 1 capsule by mouth every morning (before breakfast) 90 capsule 1    azelastine (ASTELIN) 0.1 % nasal spray 2 sprays by Nasal route 2 times daily Use in each nostril as directed 60 mL 0    Plecanatide 3 MG TABS Take 3 mg by mouth daily (Patient not taking: Reported on 5/26/2022)      vitamin D3 (CHOLECALCIFEROL) 25 MCG (1000 UT) TABS tablet Take 1 tablet by mouth daily 30 tablet 2    LOW-OGESTREL 0.3-30 MG-MCG per tablet TAKE ONE TABLET BY MOUTH DAILY (Patient not taking: Reported on 5/26/2022) 28 tablet 5    albuterol sulfate HFA (VENTOLIN HFA) 108 (90 Base) MCG/ACT inhaler Inhale 2 puffs into the lungs every 6 hours as needed for Wheezing 1 Inhaler 2     No current facility-administered medications for this visit. Allergies   Allergen Reactions    Prednisone Shortness Of Breath       Subjective:      Review of Systems   Constitutional: Negative for chills and fever. HENT: Positive for congestion and rhinorrhea. Negative for ear pain, sinus pain and sore throat. Respiratory: Negative for cough and shortness of breath. Cardiovascular: Negative for chest pain and palpitations. Gastrointestinal: Positive for nausea and vomiting. Negative for abdominal pain and diarrhea. Neurological: Negative for dizziness and headaches. All other systems reviewed and are negative.      :Objective     Physical Exam  Vitals and nursing note reviewed. Constitutional:       General: She is not in acute distress. Appearance: Normal appearance. She is not toxic-appearing. HENT:      Right Ear: Tympanic membrane normal.      Left Ear: Tympanic membrane normal.      Nose: Congestion and rhinorrhea present. Mouth/Throat:      Pharynx: No posterior oropharyngeal erythema.    Eyes: Conjunctiva/sclera:      Right eye: Right conjunctiva is injected. Left eye: Left conjunctiva is injected. Cardiovascular:      Rate and Rhythm: Normal rate. Pulmonary:      Effort: Pulmonary effort is normal.      Breath sounds: Normal breath sounds. Skin:     General: Skin is warm and dry. Neurological:      General: No focal deficit present. Mental Status: She is alert and oriented to person, place, and time.        /88 (Site: Left Upper Arm, Position: Sitting, Cuff Size: Large Adult)   Pulse (!) 115   Temp 98.6 °F (37 °C) (Tympanic)   Ht 5' 10\" (1.778 m)   Wt 236 lb (107 kg)   LMP 05/25/2022   SpO2 98%   Breastfeeding No   BMI 33.86 kg/m²     Lab Review   Office Visit on 05/26/2022   Component Date Value    Color, UA 05/26/2022 yellow     Clarity, UA 05/26/2022 clear     Glucose, UA POC 05/26/2022 Neg     Bilirubin, UA 05/26/2022 Neg     Ketones, UA 05/26/2022 Trace     Spec Grav, UA 05/26/2022 1.020     Blood, UA POC 05/26/2022 Neg     pH, UA 05/26/2022 7.0     Protein, UA POC 05/26/2022 Trace     Urobilinogen, UA 05/26/2022 0.2     Leukocytes, UA 05/26/2022 Neg     Nitrite, UA 05/26/2022 Neg    Admission on 05/25/2022, Discharged on 05/25/2022   Component Date Value    HCG(Urine) Pregnancy Test 05/25/2022 NEGATIVE     Color, UA 05/25/2022 Yellow     Turbidity UA 05/25/2022 Cloudy*    Glucose, Ur 05/25/2022 NEGATIVE     Bilirubin Urine 05/25/2022 NEGATIVE     Ketones, Urine 05/25/2022 NEGATIVE     Specific [place], UA 05/25/2022 1.020     Urine Hgb 05/25/2022 NEGATIVE     pH, UA 05/25/2022 8.0     Protein, UA 05/25/2022 NEGATIVE     Urobilinogen, Urine 05/25/2022 Normal     Nitrite, Urine 05/25/2022 NEGATIVE     Leukocyte Esterase, Urine 05/25/2022 NEGATIVE     - 05/25/2022          WBC, UA 05/25/2022 5 TO 10     RBC, UA 05/25/2022 2 TO 5     Epithelial Cells UA 05/25/2022 20 TO 50     Bacteria, UA 05/25/2022 MODERATE*    Mucus, UA 05/25/2022 3+*    Amorphous, UA 05/25/2022 2+*    Magnesium 05/25/2022 2.0     Glucose 05/25/2022 107*    BUN 05/25/2022 11     CREATININE 05/25/2022 0.78     Bun/Cre Ratio 05/25/2022 14     Calcium 05/25/2022 9.7     Sodium 05/25/2022 139     Potassium 05/25/2022 4.4     Chloride 05/25/2022 105     CO2 05/25/2022 26     Anion Gap 05/25/2022 8*    GFR Non- 05/25/2022 >60     GFR  05/25/2022 >60     GFR Comment 05/25/2022          WBC 05/25/2022 7.9     RBC 05/25/2022 5.19*    Hemoglobin 05/25/2022 13.3     Hematocrit 05/25/2022 42.5     MCV 05/25/2022 81.9*    MCH 05/25/2022 25.6     MCHC 05/25/2022 31.3     RDW 05/25/2022 13.1     Platelets 19/04/5463 258     MPV 05/25/2022 8.9     RBC Morphology 05/25/2022 MICROCYTOSIS PRESENT     Seg Neutrophils 05/25/2022 71*    Lymphocytes 05/25/2022 20*    Monocytes 05/25/2022 8     Eosinophils % 05/25/2022 1     Basophils 05/25/2022 0     Immature Granulocytes 05/25/2022 0     Segs Absolute 05/25/2022 5.59     Absolute Lymph # 05/25/2022 1.58     Absolute Mono # 05/25/2022 0.61     Absolute Eos # 05/25/2022 0.04     Basophils Absolute 05/25/2022 0.03     Absolute Immature Granul* 05/25/2022 0.01     Specimen Description 05/25/2022 . NASOPHARYNGEAL SWAB     SARS-CoV-2, Rapid 05/25/2022 Not Detected     Ventricular Rate 05/25/2022 98     Atrial Rate 05/25/2022 98     P-R Interval 05/25/2022 170     QRS Duration 05/25/2022 96     Q-T Interval 05/25/2022 340     QTc Calculation (Bazett) 05/25/2022 434     P Axis 05/25/2022 27     R Axis 05/25/2022 35     T Axis 05/25/2022 [address] Outpatient Visit on 04/21/2022   Component Date Value    Source 04/21/2022 . VAGINAL SWAB     Trichomonas Vaginalis DNA 04/21/2022 NEGATIVE     GARDNERELLA VAGINALIS, D* 04/21/2022 NEGATIVE     KAITLIN SPECIES, DNA PRO* 04/21/2022 [address] Outpatient Visit on 01/08/2022   Component Date Value    SARS-CoV-2 01/08/2022  Source 01/08/2022 . NASOPHARYNGEAL SWAB     SARS-CoV-2 01/08/2022 DETECTED*       Assessment and Plan      1. COVID  -     ondansetron (ZOFRAN) 4 MG tablet; Take 1 tablet by mouth every 8 hours as needed for Nausea or Vomiting (1- 2TABS EVERY 8 HOURS PRN), Disp-30 tablet, R-0Normal  -     benzonatate (TESSALON) 200 MG capsule; Take 1 capsule by mouth 3 times daily as needed for Cough, Disp-30 capsule, R-0Normal  -     trimethoprim-polymyxin b (POLYTRIM) 06421-1.1 UNIT/ML-% ophthalmic solution; Place 1 drop into both eyes every 4 hours for 7 days, Disp-10 mL, R-0Normal       Discussed cough med, zofran, eye drops  Discussed treatment regimen to include rest, hydration, tylenol prn. Discussed deep breathing exercises. Discussed to monitor for progression of symptoms. Follow up as needed. Will contact patient for results  ER for acutely worsening symptoms. No results found for this visit on 06/12/22. Return if symptoms worsen or fail to improve. Orders Placed This Encounter   Medications    ondansetron (ZOFRAN) 4 MG tablet     Sig: Take 1 tablet by mouth every 8 hours as needed for Nausea or Vomiting (1- 2TABS EVERY 8 HOURS PRN)     Dispense:  30 tablet     Refill:  0    benzonatate (TESSALON) 200 MG capsule     Sig: Take 1 capsule by mouth 3 times daily as needed for Cough     Dispense:  30 capsule     Refill:  0    trimethoprim-polymyxin b (POLYTRIM) 70624-6.1 UNIT/ML-% ophthalmic solution     Sig: Place 1 drop into both eyes every 4 hours for 7 days     Dispense:  10 mL     Refill:  0        Patient given educational materials - see patient instructions. Discussed use, benefit, and side effects of prescribed medications. All patientquestions answered. Pt voiced understanding. Patient given educational materials - see patient instructions. Discussed use, benefit, and side effects of prescribed medications. All patientquestions answered. Pt voiced understanding.     This note was transcribed using dictation with Dragon services. Efforts were made to correct any errors but some words may be misinterpreted.     Patient assumes risks associated with failure to complete recommended testing and treatments in a timely manner    Electronically signed by URIEL Cash CNP on 6/12/2022at 1:17 PM

## 2022-06-12 NOTE — PATIENT INSTRUCTIONS
Advance Care Planning  People with COVID-19 may have no symptoms, mild symptoms, such as fever, cough, and shortness of breath or they may have more severe illness, developing severe and fatal pneumonia. As a result, Advance Care Planning with attention to naming a health care decision maker (someone you trust to make healthcare decisions for you if you could not speak for yourself) and sharing other health care preferences is important BEFORE a possible health crisis. Please contact your Primary Care Provider to discuss Advance Care Planning. Preventing the Spread of Coronavirus Disease 2019 in Homes and Residential Communities  For the most recent information go to [email or web address]    Prevention steps for People with confirmed or suspected COVID-19 (including persons under investigation) who do not need to be hospitalized  and   People with confirmed COVID-19 who were hospitalized and determined to be medically stable to go home    Your healthcare provider and public health staff will evaluate whether you can be cared for at home. If it is determined that you do not need to be hospitalized and can be isolated at home, you will be monitored by staff from your local or state health department. You should follow the prevention steps below until a healthcare provider or local or state health department says you can return to your normal activities. Stay home except to get medical care  People who are mildly ill with COVID-19 are able to isolate at home during their illness. You should restrict activities outside your home, except for getting medical care. Do not go to work, school, or public areas. Avoid using public transportation, ride-sharing, or taxis. Separate yourself from other people and animals in your home  People: As much as possible, you should stay in a specific room and away from other people in your home.  Also, you should use a separate before eating or preparing food. If soap and water are not readily available, use an alcohol-based hand  with at least 60% alcohol, covering all surfaces of your hands and rubbing them together until they feel dry. Soap and water are the best option if hands are visibly dirty. Avoid touching your eyes, nose, and mouth with unwashed hands. Avoid sharing personal household items  You should not share dishes, drinking glasses, cups, eating utensils, towels, or bedding with other people or pets in your home. After using these items, they should be washed thoroughly with soap and water. Clean all high-touch surfaces everyday  High touch surfaces include counters, tabletops, doorknobs, bathroom fixtures, toilets, phones, keyboards, tablets, and bedside tables. Also, clean any surfaces that may have blood, stool, or body fluids on them. Use a household cleaning spray or wipe, according to the label instructions. Labels contain instructions for safe and effective use of the cleaning product including precautions you should take when applying the product, such as wearing gloves and making sure you have good ventilation during use of the product. Monitor your symptoms  Seek prompt medical attention if your illness is worsening (e.g., difficulty breathing). Before seeking care, call your healthcare provider and tell them that you have, or are being evaluated for, COVID-19. Put on a facemask before you enter the facility. These steps will help the healthcare providers office to keep other people in the office or waiting room from getting infected or exposed. Ask your healthcare provider to call the local or state health department. Persons who are placed under active monitoring or facilitated self-monitoring should follow instructions provided by their local health department or occupational health professionals, as appropriate. When working with your local health department check their available hours.   If you have a medical emergency and need to call 911, notify the dispatch personnel that you have, or are being evaluated for COVID-19. If possible, put on a facemask before emergency medical services arrive. Discontinuing home isolation  Patients with confirmed COVID-19 should remain under home isolation precautions until the risk of secondary transmission to others is thought to be low. The decision to discontinue home isolation precautions should be made on a case-by-case basis, in consultation with healthcare providers and state and local health departments.

## 2022-06-13 ENCOUNTER — PATIENT MESSAGE (OUTPATIENT)
Dept: PRIMARY CARE CLINIC | Age: 22
End: 2022-06-13

## 2022-06-13 DIAGNOSIS — U07.1 COVID: Primary | ICD-10-CM

## 2022-06-15 NOTE — TELEPHONE ENCOUNTER
From: Papi Erickson  To: Constanza Mclain  Sent: 6/13/2022 3:57 PM EDT  Subject: Follow Up    Panda Herrera, I just saw you yesterday. This morning I had blood gushing out of my nose and coming out of my mouth. Is there something more that I need to do?

## 2022-06-19 RX ORDER — AMOXICILLIN 875 MG/1
875 TABLET, COATED ORAL 2 TIMES DAILY
Qty: 20 TABLET | Refills: 0 | Status: SHIPPED | OUTPATIENT
Start: 2022-06-19 | End: 2022-06-29

## 2022-06-19 RX ORDER — METHYLPREDNISOLONE 4 MG/1
TABLET ORAL
Qty: 21 TABLET | Refills: 0 | Status: SHIPPED | OUTPATIENT
Start: 2022-06-19 | End: 2022-06-25

## 2022-07-17 ENCOUNTER — PATIENT MESSAGE (OUTPATIENT)
Dept: PRIMARY CARE CLINIC | Age: 22
End: 2022-07-17

## 2022-07-17 DIAGNOSIS — R39.9 UTI SYMPTOMS: Primary | ICD-10-CM

## 2022-07-18 RX ORDER — CEPHALEXIN 500 MG/1
500 CAPSULE ORAL 2 TIMES DAILY
Qty: 14 CAPSULE | Refills: 0 | Status: SHIPPED | OUTPATIENT
Start: 2022-07-18 | End: 2022-07-25

## 2022-07-18 NOTE — TELEPHONE ENCOUNTER
Afia Lawson MA 7/18/2022 8:40 AM EDT      ----- Message -----  From: Maurice Ashia  Sent: 7/17/2022 3:37 PM EDT  To: [facility] Clinical Staff  Subject: Prescription     Devin Javier,    I have all the symptoms of a UTI, is there any way you can call in some medicine for me?     Thank you,  Cecilio Aschoff

## 2022-08-19 ENCOUNTER — HOSPITAL ENCOUNTER (OUTPATIENT)
Age: 22
Setting detail: SPECIMEN
Discharge: HOME | End: 2022-08-19

## 2022-08-19 ENCOUNTER — OFFICE VISIT (OUTPATIENT)
Dept: PRIMARY CARE CLINIC | Age: 22
End: 2022-08-19
Payer: COMMERCIAL

## 2022-08-19 VITALS
SYSTOLIC BLOOD PRESSURE: 135 MMHG | HEART RATE: 99 BPM | OXYGEN SATURATION: 98 % | DIASTOLIC BLOOD PRESSURE: 86 MMHG | TEMPERATURE: 97.4 F

## 2022-08-19 DIAGNOSIS — R05.9 COUGH: ICD-10-CM

## 2022-08-19 DIAGNOSIS — R52 GENERALIZED BODY ACHES: ICD-10-CM

## 2022-08-19 DIAGNOSIS — Z20.822 EXPOSURE TO COVID-19 VIRUS: Primary | ICD-10-CM

## 2022-08-19 DIAGNOSIS — Z20.822 EXPOSURE TO COVID-19 VIRUS: ICD-10-CM

## 2022-08-19 PROCEDURE — 99213 OFFICE O/P EST LOW 20 MIN: CPT

## 2022-08-19 RX ORDER — IBUPROFEN 600 MG/1
TABLET ORAL PRN
COMMUNITY
Start: 2022-08-08

## 2022-08-19 RX ORDER — BENZONATATE 200 MG/1
200 CAPSULE ORAL 3 TIMES DAILY PRN
Qty: 30 CAPSULE | Refills: 0 | Status: SHIPPED | OUTPATIENT
Start: 2022-08-19 | End: 2022-08-26

## 2022-08-19 ASSESSMENT — ENCOUNTER SYMPTOMS
DIARRHEA: 0
SORE THROAT: 0
VOMITING: 0
COUGH: 1
EYE DISCHARGE: 0
WHEEZING: 0
CHEST TIGHTNESS: 1
RHINORRHEA: 1
SHORTNESS OF BREATH: 1

## 2022-08-19 NOTE — LETTER
[address] [address]  Phone: [phone]  Fax: [phone]    URIEL Rodriguez CNP        August 19, 2022     Patient: Fran Jewell   YOB: 2000   Date of Visit: 8/19/2022       To Whom It May Concern: It is my medical opinion that Fran Jewell should remain out of work until Wm. Zakiya Alvarez are back. If you have any questions or concerns, please don't hesitate to call.     Sincerely,        URIEL Tamez CNP

## 2022-08-19 NOTE — PROGRESS NOTES
[address] IN [facility] [id number] [place] [address] WALK IN [facility]  [address] [place], [place] [id number]  Dept: Lindsay Martins is a 22 y.o. female Established patient, who presents to the walk-in clinic today with conditions/complaints as noted below:    Chief Complaint   Patient presents with    Concern For 81 94 31 with chills/sweats - positive exposure and needs a covid test for work         HPI:     Patient is a 81-year-old female that presents today for COVID-19 testing following exposure. States that she picked her nephew who was exposed at  and also lives with her. She developed symptoms a couple days ago which include chills, hot/cold flashes, congestion, cough, shortness of breath, and body aches. Denies any fevers, sore throat, chest pain, diarrhea, or vomiting. She has not been vaccinated. She hasn't taken anything for her symptoms. States that she needs tested for work. Past Medical History:   Diagnosis Date    Allergic     Anxiety     Asthma     Cyst of ovary, right     Eczema     Headache     Hypoglycemia     Neurocardiogenic syncope     Pneumonia     Reflux esophagitis     Seizures (HCC)        Current Outpatient Medications   Medication Sig Dispense Refill    ibuprofen (ADVIL;MOTRIN) 600 MG tablet as needed      benzonatate (TESSALON) 200 MG capsule Take 1 capsule by mouth 3 times daily as needed for Cough 30 capsule 0    ondansetron (ZOFRAN) 4 MG tablet Take 1 tablet by mouth every 8 hours as needed for Nausea or Vomiting (1- 2TABS EVERY 8 HOURS PRN) 30 tablet 0    triamcinolone (KENALOG) 0.1 % cream Apply topically 2 times daily.  30 g 0    meclizine (ANTIVERT) 25 MG tablet Take 1 tablet by mouth 3 times daily as needed for Dizziness or Nausea 15 tablet 0    sertraline (ZOLOFT) 100 MG tablet TAKE TWO TABLETS BY MOUTH DAILY 60 tablet 4    hydrOXYzine (ATARAX) 50 MG tablet Take 1 tablet by mouth every 8 hours as needed for Anxiety 90 tablet 5    omeprazole (PRILOSEC) 40 MG delayed release capsule Take 1 capsule by mouth every morning (before breakfast) 90 capsule 1    azelastine (ASTELIN) 0.1 % nasal spray 2 sprays by Nasal route 2 times daily Use in each nostril as directed 60 mL 0    vitamin D3 (CHOLECALCIFEROL) 25 MCG (1000 UT) TABS tablet Take 1 tablet by mouth daily 30 tablet 2    albuterol sulfate HFA (VENTOLIN HFA) 108 (90 Base) MCG/ACT inhaler Inhale 2 puffs into the lungs every 6 hours as needed for Wheezing 1 Inhaler 2    omeprazole (PRILOSEC) 20 MG delayed release capsule  (Patient not taking: Reported on 8/19/2022)      Plecanatide 3 MG TABS Take 3 mg by mouth daily (Patient not taking: No sig reported)      LOW-OGESTREL 0.3-30 MG-MCG per tablet TAKE ONE TABLET BY MOUTH DAILY (Patient not taking: No sig reported) 28 tablet 5     No current facility-administered medications for this visit. Allergies   Allergen Reactions    Prednisone Shortness Of Breath       :     Review of Systems   Constitutional:  Positive for chills. Negative for fever. HENT:  Positive for congestion and rhinorrhea. Negative for ear pain and sore throat. Eyes:  Negative for discharge. Respiratory:  Positive for cough, chest tightness and shortness of breath. Negative for wheezing. Cardiovascular:  Negative for chest pain. Gastrointestinal:  Negative for diarrhea and vomiting. Musculoskeletal:  Positive for myalgias. Skin:  Negative for rash. Neurological:  Positive for headaches. Negative for dizziness.     :     /86   Pulse 99   Temp 97.4 °F (36.3 °C) (Tympanic)   LMP 08/17/2022 (Exact Date)   SpO2 98%     Physical Exam  Vitals reviewed. Constitutional:       General: She is not in acute distress. Appearance: Normal appearance. She is obese. She is not ill-appearing.    HENT:      Head: Normocephalic and atraumatic. Right Ear: Tympanic membrane, ear canal and external ear normal.      Left Ear: Tympanic membrane, ear canal and external ear normal.      Nose: Congestion present. No rhinorrhea. Mouth/Throat:      Mouth: Mucous membranes are moist.      Pharynx: Oropharynx is clear. Eyes:      Conjunctiva/sclera: Conjunctivae normal.   Cardiovascular:      Rate and Rhythm: Normal rate and regular rhythm. Heart sounds: Normal heart sounds. Pulmonary:      Effort: Pulmonary effort is normal.      Breath sounds: Normal breath sounds. No wheezing, rhonchi or rales. Musculoskeletal:      Cervical back: Neck supple. Lymphadenopathy:      Cervical: No cervical adenopathy. Skin:     General: Skin is warm and dry. Findings: No rash. Neurological:      Mental Status: She is alert and oriented to person, place, and time. Psychiatric:         Mood and Affect: Mood normal.         Behavior: Behavior normal.         :          1. Exposure to COVID-19 virus  -     COVID-19; Future  2. Cough  -     COVID-19; Future  -     benzonatate (TESSALON) 200 MG capsule; Take 1 capsule by mouth 3 times daily as needed for Cough, Disp-30 capsule, R-0Normal  3. Generalized body aches  -     COVID-19; Future       :      Return if symptoms worsen or fail to improve. Orders Placed This Encounter   Medications    benzonatate (TESSALON) 200 MG capsule     Sig: Take 1 capsule by mouth 3 times daily as needed for Cough     Dispense:  30 capsule     Refill:  0     COVID-19 swab obtained in office, will call with results. Quarantine according to current CDC guidelines. Instructed to continue with symptomatic treatment. Push oral hydration and rest.  May use cough suppressant as needed. Use acetaminophen or ibuprofen as needed for headaches, body aches, or fevers. Follow-up if no improvement. Patient and/or parent given educational materials - see patient instructions.   Discussed use, benefit, and side effects of prescribed medications. All patient questions answered. Patient and/or parent voiced understanding.       Electronically signed by URIEL Montes De Oca 8/19/2022 at 5:24 PM

## 2022-08-19 NOTE — PATIENT INSTRUCTIONS
Quarantine pending test results. Continue with symptomatic treatment. Push oral hydration and rest.  Use Tylenol or Motrin as needed for headaches, body aches, or fevers. May use cough suppressant as needed. Follow-up if no improvement.

## 2022-08-20 LAB
SARS-COV-2: NORMAL
SARS-COV-2: NOT DETECTED
SOURCE: NORMAL

## 2022-09-16 ENCOUNTER — TELEPHONE (OUTPATIENT)
Dept: FAMILY MEDICINE CLINIC | Age: 22
End: 2022-09-16

## 2022-09-16 NOTE — TELEPHONE ENCOUNTER
Patient called in about the the paperwork she dropped off. Patient has been missing work on/off due to stomach issue and heart condition. Patient states her job will terminate her without the [facility] paperwork.

## 2022-09-19 NOTE — TELEPHONE ENCOUNTER
Pt is seeing cardio Dr. Rosy Rizzo. And promedicdenice SARAVIA. Pt is calling gi to make a follow up appt with. Pt needs forms sent in by Wednesday or she will be let go.

## 2022-12-01 ENCOUNTER — OFFICE VISIT (OUTPATIENT)
Dept: FAMILY MEDICINE CLINIC | Age: 22
End: 2022-12-01
Payer: COMMERCIAL

## 2022-12-01 VITALS
HEART RATE: 94 BPM | OXYGEN SATURATION: 99 % | DIASTOLIC BLOOD PRESSURE: 84 MMHG | HEIGHT: 70 IN | RESPIRATION RATE: 16 BRPM | BODY MASS INDEX: 33.93 KG/M2 | TEMPERATURE: 97.4 F | SYSTOLIC BLOOD PRESSURE: 128 MMHG | WEIGHT: 237 LBS

## 2022-12-01 DIAGNOSIS — K21.9 GASTROESOPHAGEAL REFLUX DISEASE WITHOUT ESOPHAGITIS: ICD-10-CM

## 2022-12-01 DIAGNOSIS — Z13.220 LIPID SCREENING: ICD-10-CM

## 2022-12-01 DIAGNOSIS — Z13.1 DIABETES MELLITUS SCREENING: ICD-10-CM

## 2022-12-01 DIAGNOSIS — F41.9 ANXIETY: Primary | ICD-10-CM

## 2022-12-01 PROCEDURE — 99213 OFFICE O/P EST LOW 20 MIN: CPT | Performed by: NURSE PRACTITIONER

## 2022-12-01 RX ORDER — HYDROXYZINE 50 MG/1
50 TABLET, FILM COATED ORAL EVERY 8 HOURS PRN
Qty: 90 TABLET | Refills: 5 | Status: SHIPPED | OUTPATIENT
Start: 2022-12-01

## 2022-12-01 RX ORDER — PANTOPRAZOLE SODIUM 20 MG/1
20 TABLET, DELAYED RELEASE ORAL
Qty: 30 TABLET | Refills: 3 | Status: SHIPPED | OUTPATIENT
Start: 2022-12-01

## 2022-12-01 RX ORDER — ESCITALOPRAM OXALATE 10 MG/1
10 TABLET ORAL DAILY
Qty: 30 TABLET | Refills: 5 | Status: SHIPPED | OUTPATIENT
Start: 2022-12-01

## 2022-12-01 SDOH — ECONOMIC STABILITY: FOOD INSECURITY: WITHIN THE PAST 12 MONTHS, YOU WORRIED THAT YOUR FOOD WOULD RUN OUT BEFORE YOU GOT MONEY TO BUY MORE.: NEVER TRUE

## 2022-12-01 SDOH — ECONOMIC STABILITY: FOOD INSECURITY: WITHIN THE PAST 12 MONTHS, THE FOOD YOU BOUGHT JUST DIDN'T LAST AND YOU DIDN'T HAVE MONEY TO GET MORE.: NEVER TRUE

## 2022-12-01 ASSESSMENT — ENCOUNTER SYMPTOMS
SHORTNESS OF BREATH: 0
DIARRHEA: 0
COUGH: 0
ABDOMINAL PAIN: 0
VOMITING: 0
NAUSEA: 0
SORE THROAT: 0
SINUS PAIN: 0

## 2022-12-01 ASSESSMENT — SOCIAL DETERMINANTS OF HEALTH (SDOH): HOW HARD IS IT FOR YOU TO PAY FOR THE VERY BASICS LIKE FOOD, HOUSING, MEDICAL CARE, AND HEATING?: NOT HARD AT ALL

## 2022-12-01 NOTE — PATIENT INSTRUCTIONS
Patient Education        Anxiety Disorder: Care Instructions  Your Care Instructions     Anxiety is a normal reaction to stress. Difficult situations can cause you to have symptoms such as sweaty palms and a nervous feeling. In an anxiety disorder, the symptoms are far more severe. Constant worry, muscle tension, trouble sleeping, nausea and diarrhea, and other symptoms can make normal daily activities difficult or impossible. These symptoms may occur for no reason, and they can affect your work, school, or social life. Medicines, counseling, and self-care can all help. Follow-up care is a key part of your treatment and safety. Be sure to make and go to all appointments, and call your doctor if you are having problems. It's also a good idea to know your test results and keep a list of the medicines you take. How can you care for yourself at home? Take medicines exactly as directed. Call your doctor if you think you are having a problem with your medicine. Go to your counseling sessions and follow-up appointments. Recognize and accept your anxiety. Then, when you are in a situation that makes you anxious, say to yourself, \"This is not an emergency. I feel uncomfortable, but I am not in danger. I can keep going even if I feel anxious. \"  Be kind to your body:  Relieve tension with exercise or a massage. Get enough rest.  Avoid alcohol, caffeine, nicotine, and illegal drugs. They can increase your anxiety level and cause sleep problems. Learn and do relaxation techniques. See below for more about these techniques. Engage your mind. Get out and do something you enjoy. Go to a funny movie, or take a walk or hike. Plan your day. Having too much or too little to do can make you anxious. Keep a record of your symptoms. Discuss your fears with a good friend or family member, or join a support group for people with similar problems. Talking to others sometimes relieves stress.   Get involved in social groups, or volunteer to help others. Being alone sometimes makes things seem worse than they are. Get at least 30 minutes of exercise on most days of the week to relieve stress. Walking is a good choice. You also may want to do other activities, such as running, swimming, cycling, or playing tennis or team sports. Relaxation techniques  Do relaxation exercises 10 to 20 minutes a day. You can play soothing, relaxing music while you do them, if you wish. Tell others in your house that you are going to do your relaxation exercises. Ask them not to disturb you. Find a comfortable place, away from all distractions and noise. Lie down on your back, or sit with your back straight. Focus on your breathing. Make it slow and steady. Breathe in through your nose. Breathe out through either your nose or mouth. Breathe deeply, filling up the area between your navel and your rib cage. Breathe so that your belly goes up and down. Do not hold your breath. Breathe like this for 5 to 10 minutes. Notice the feeling of calmness throughout your whole body. As you continue to breathe slowly and deeply, relax by doing the following for another 5 to 10 minutes:  Tighten and relax each muscle group in your body. You can begin at your toes and work your way up to your head. Imagine your muscle groups relaxing and becoming heavy. Empty your mind of all thoughts. Let yourself relax more and more deeply. Become aware of the state of calmness that surrounds you. When your relaxation time is over, you can bring yourself back to alertness by moving your fingers and toes and then your hands and feet and then stretching and moving your entire body. Sometimes people fall asleep during relaxation, but they usually wake up shortly afterward. Always give yourself time to return to full alertness before you drive a car or do anything that might cause an accident if you are not fully alert. Never play a relaxation tape while you drive a car.   When should you call for help? Call 911 anytime you think you may need emergency care. For example, call if:    You feel you cannot stop from hurting yourself or someone else. Keep the numbers for these national suicide hotlines: 6-315-527-TALK (5-634.823.4939) and 5-439-LSGOSCS (6-905.316.6251). If you or someone you know talks about suicide or feeling hopeless, get help right away. Watch closely for changes in your health, and be sure to contact your doctor if:    You have anxiety or fear that affects your life. You have symptoms of anxiety that are new or different from those you had before. Where can you learn more? Go to https://[employer].ITelagen. org and sign in to your [employer] account. Enter P754 in the [employer] box to learn more about \"Anxiety Disorder: Care Instructions. \"     If you do not have an account, please click on the \"Sign Up Now\" link. Current as of: September 23, 2020               Content Version: 12.9  © 4643-6215 Healthwise, Incorporated. Care instructions adapted under license by [facility] ([facility]). If you have questions about a medical condition or this instruction, always ask your healthcare professional. [address] any warranty or liability for your use of this information.

## 2022-12-01 NOTE — PROGRESS NOTES
Visit Information    Have you changed or started any medications since your last visit including any over-the-counter medicines, vitamins, or herbal medicines? no   Are you having any side effects from any of your medications? -  no  Have you stopped taking any of your medications? Is so, why? -  no    Have you seen any other physician or provider since your last visit? No  Have you had any other diagnostic tests since your last visit? No  Have you been seen in the emergency room and/or had an admission to a hospital since we last saw you? No  Have you had your routine dental cleaning in the past 6 months? yes -     Have you activated your [employer] account? If not, what are your barriers?  Yes     Patient Care Team:  URIEL Dickinson CNP as PCP - General (Certified Nurse Practitioner)  URIEL Dickinson CNP as PCP - [facility] Provider    Medical History Review  Past Medical, Family, and Social History reviewed and does contribute to the patient presenting condition    Health Maintenance   Topic Date Due    COVID-19 Vaccine (1) Never done    HIV screen  Never done    Chlamydia/GC screen  Never done    Hepatitis C screen  Never done    Flu vaccine (1) 08/01/2022    Pap smear  Never done    Depression Monitoring  01/08/2023    DTaP/Tdap/Td vaccine (7 - Td or Tdap) 06/04/2023    Hepatitis A vaccine  Completed    Hib vaccine  Completed    HPV vaccine  Completed    Varicella vaccine  Completed    Meningococcal (ACWY) vaccine  Aged Out    Pneumococcal 0-64 years Vaccine  Aged Out
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
## 2022-12-20 ENCOUNTER — TELEPHONE (OUTPATIENT)
Dept: FAMILY MEDICINE CLINIC | Age: 22
End: 2022-12-20

## 2022-12-20 DIAGNOSIS — Z11.3 SCREENING EXAMINATION FOR STD (SEXUALLY TRANSMITTED DISEASE): Primary | ICD-10-CM

## 2022-12-20 NOTE — TELEPHONE ENCOUNTER
Patient stopped in to drop off [facility] paperwork. She received a post card in the mail stating if she got a chlamydia screening they would give her a $50 gift card. Can you place order for this?  Patient would like a call when ready

## 2022-12-30 ENCOUNTER — HOSPITAL ENCOUNTER (OUTPATIENT)
Age: 22
Setting detail: SPECIMEN
Discharge: HOME | End: 2022-12-30

## 2022-12-30 DIAGNOSIS — Z11.3 SCREENING EXAMINATION FOR STD (SEXUALLY TRANSMITTED DISEASE): ICD-10-CM

## 2023-01-23 ENCOUNTER — TELEPHONE (OUTPATIENT)
Dept: FAMILY MEDICINE CLINIC | Age: 23
End: 2023-01-23

## 2023-01-23 DIAGNOSIS — K21.9 GASTROESOPHAGEAL REFLUX DISEASE WITHOUT ESOPHAGITIS: Primary | ICD-10-CM

## 2023-01-23 NOTE — TELEPHONE ENCOUNTER
Patient called in stating that she needs a referral for GI. She wants to see Dr. Abad Falling with [facility]. She said this is due to her ongoing stomach issues, abdominal pain and vomiting.

## 2023-01-30 ENCOUNTER — TELEPHONE (OUTPATIENT)
Dept: FAMILY MEDICINE CLINIC | Age: 23
End: 2023-01-30

## 2023-01-30 NOTE — TELEPHONE ENCOUNTER
Pt called needs to schedule a NP appt with Dr Renee Rodriguez referral in [address] [place] [address] Rd

## 2023-02-13 ENCOUNTER — TELEPHONE (OUTPATIENT)
Dept: FAMILY MEDICINE CLINIC | Age: 23
End: 2023-02-13

## 2023-02-13 DIAGNOSIS — K21.9 GASTROESOPHAGEAL REFLUX DISEASE WITHOUT ESOPHAGITIS: Primary | ICD-10-CM

## 2023-02-13 RX ORDER — PANTOPRAZOLE SODIUM 40 MG/1
40 TABLET, DELAYED RELEASE ORAL
Qty: 30 TABLET | Refills: 2 | Status: SHIPPED | OUTPATIENT
Start: 2023-02-13

## 2023-02-13 NOTE — TELEPHONE ENCOUNTER
Patient is calling asking for a higher dose of Pantoprazole states that the 20 mg dose work but thinks if it was a high dose it would last longer.     Please advise

## 2023-03-10 ENCOUNTER — OFFICE VISIT (OUTPATIENT)
Dept: GASTROENTEROLOGY | Age: 23
End: 2023-03-10
Payer: COMMERCIAL

## 2023-03-10 ENCOUNTER — HOSPITAL ENCOUNTER (OUTPATIENT)
Age: 23
Discharge: HOME | End: 2023-03-10
Payer: COMMERCIAL

## 2023-03-10 VITALS
DIASTOLIC BLOOD PRESSURE: 76 MMHG | WEIGHT: 237 LBS | SYSTOLIC BLOOD PRESSURE: 138 MMHG | HEIGHT: 70 IN | BODY MASS INDEX: 33.93 KG/M2

## 2023-03-10 DIAGNOSIS — K21.9 GASTROESOPHAGEAL REFLUX DISEASE WITHOUT ESOPHAGITIS: ICD-10-CM

## 2023-03-10 DIAGNOSIS — K21.9 GASTROESOPHAGEAL REFLUX DISEASE WITHOUT ESOPHAGITIS: Primary | ICD-10-CM

## 2023-03-10 LAB
GLIADIN IGA SER IA-ACNC: NORMAL U/ML
GLIADIN IGG SER IA-ACNC: NORMAL U/ML
IGA SERPL-MCNC: 316 MG/DL (ref 70–400)
TTG IGA SER IA-ACNC: NORMAL U/ML

## 2023-03-10 PROCEDURE — 36415 COLL VENOUS BLD VENIPUNCTURE: CPT

## 2023-03-10 PROCEDURE — 83516 IMMUNOASSAY NONANTIBODY: CPT

## 2023-03-10 PROCEDURE — 99203 OFFICE O/P NEW LOW 30 MIN: CPT | Performed by: INTERNAL MEDICINE

## 2023-03-10 PROCEDURE — 82784 ASSAY IGA/IGD/IGG/IGM EACH: CPT

## 2023-03-10 RX ORDER — PANTOPRAZOLE SODIUM 20 MG/1
20 TABLET, DELAYED RELEASE ORAL EVERY EVENING
Qty: 30 TABLET | Refills: 3 | Status: SHIPPED | OUTPATIENT
Start: 2023-03-10

## 2023-03-10 ASSESSMENT — ENCOUNTER SYMPTOMS
SORE THROAT: 0
CHOKING: 0
VOMITING: 1
COUGH: 0
SHORTNESS OF BREATH: 1
BLOOD IN STOOL: 0
EYES NEGATIVE: 1
RECTAL PAIN: 0
CHEST TIGHTNESS: 0
TROUBLE SWALLOWING: 0
CONSTIPATION: 0
ABDOMINAL PAIN: 1
ABDOMINAL DISTENTION: 1
NAUSEA: 1
DIARRHEA: 0
ANAL BLEEDING: 0

## 2023-03-10 NOTE — PROGRESS NOTES
Reason for Referral:  GERD      Negro Kothari, APRN - CNP  [id number] [id number]    Chief Complaint   Patient presents with    Gastroesophageal Reflux     At night: stabbing and throbbing pain, 12-24 hours at a time. Not much helps,   Pattern with deep fried foods. HISTORY OF PRESENT ILLNESS: Ms.Kasidy Flores Montilla is a 25 y.o. female with a past history remarkable for chronic GERD symptoms with sharp stabbing throbbing pain in the epigastric region typically triggered by fried foods, referred for evaluation of the symptoms. Patient has a family history of GERD, maternal grandfather with inflammatory bowel disease. He denies any secondary weight loss. Currently on Protonix 40 mg daily. On Zofran as needed for intermittent nausea symptoms. No recent endoscopic evaluation. Smoker: vape   Drinking history: socially   Illicit drugs: CBD   Abdominal surgeries: None  Prior Colonoscopy: None  Prior EGD: None   FH of GI issues: Father- GERD. GF-maternal- IBD,     Past Medical,Family, and Social History reviewed and does contribute to the patient presentingcondition. Patient's PMH/PSH,SH,PSYCH Hx, MEDs, ALLERGIES, and ROS were all reviewed and updated in the appropriate sections.     PAST MEDICAL HISTORY:  Past Medical History:   Diagnosis Date    Allergic     Anxiety     Asthma     Cyst of ovary, right     Eczema     Headache     Hypoglycemia     Neurocardiogenic syncope     Pneumonia     Reflux esophagitis     Seizures ([facility] [address]        Past Surgical History:   Procedure Laterality Date    CHEST TUBE INSERTION Left 2006    [facility] EGD TRANSORAL BIOPSY SINGLE/MULTIPLE N/A 5/31/2018    EGD BIOPSY - GI SCHEDULED performed by Ivis Dial MD at [address] [place] [address] Avenue:    Current Outpatient Medications:     CHLOROPHYLL EX, Apply topically, Disp: , Rfl:     FENUGREEK PO, Take by mouth, Disp: , Rfl:     UNABLE TO FIND, Slippery elm, Disp: , Rfl:     pantoprazole (PROTONIX) 20 MG tablet, Take 1 tablet by mouth every evening, Disp: 30 tablet, Rfl: 3    pantoprazole (PROTONIX) 40 MG tablet, Take 1 tablet by mouth every morning (before breakfast), Disp: 30 tablet, Rfl: 2    escitalopram (LEXAPRO) 10 MG tablet, Take 1 tablet by mouth daily, Disp: 30 tablet, Rfl: 5    hydrOXYzine HCl (ATARAX) 50 MG tablet, Take 1 tablet by mouth every 8 hours as needed for Anxiety, Disp: 90 tablet, Rfl: 5    ibuprofen (ADVIL;MOTRIN) 600 MG tablet, as needed, Disp: , Rfl:     ondansetron (ZOFRAN) 4 MG tablet, Take 1 tablet by mouth every 8 hours as needed for Nausea or Vomiting (1- 2TABS EVERY 8 HOURS PRN), Disp: 30 tablet, Rfl: 0    triamcinolone (KENALOG) 0.1 % cream, Apply topically 2 times daily. , Disp: 30 g, Rfl: 0    azelastine (ASTELIN) 0.1 % nasal spray, 2 sprays by Nasal route 2 times daily Use in each nostril as directed, Disp: 60 mL, Rfl: 0    vitamin D3 (CHOLECALCIFEROL) 25 MCG (1000 UT) TABS tablet, Take 1 tablet by mouth daily, Disp: 30 tablet, Rfl: 2    albuterol sulfate HFA (VENTOLIN HFA) 108 (90 Base) MCG/ACT inhaler, Inhale 2 puffs into the lungs every 6 hours as needed for Wheezing, Disp: 1 Inhaler, Rfl: 2    ALLERGIES:   Allergies   Allergen Reactions    Prednisone Shortness Of Breath       FAMILY HISTORY:       Problem Relation Age of Onset    Diabetes Other     Irritable Bowel Syndrome Other     Migraines Other     Thyroid Disease Other     Other Other         Gallstones, Constipation, Intestinal Polyps, Lactose intolerance, stomach ulcers    Fainting Mother     Migraines Mother     High Blood Pressure Father     Depression Father     Anxiety Disorder Father     High Blood Pressure Sister     Migraines Sister     Depression Sister     No Known Problems Brother     No Known Problems Sister          SOCIAL HISTORY:   Social History     Socioeconomic History    Marital status: Single     Spouse name: Not on file    Number of children: Not on file    Years of education: Not on file    Highest education level: Not on file   Occupational History    Not on file   Tobacco Use    Smoking status: Never    Smokeless tobacco: Never   Vaping Use    Vaping Use: Never used   Substance and Sexual Activity    Alcohol use: No    Drug use: No    Sexual activity: Not on file   Other Topics Concern    Not on file   Social History Narrative    Not on file     Social Determinants of Health     Financial Resource Strain: Low Risk     Difficulty of Paying Living Expenses: Not hard at all   Food Insecurity: No Food Insecurity    Worried About Running Out of Food in the Last Year: Never true    Ran Out of Food in the Last Year: Never true   Transportation Needs: Not on file   Physical Activity: Not on file   Stress: Not on file   Social Connections: Not on file   Intimate Partner Violence: Not on file   Housing Stability: Not on file         REVIEW OF SYSTEMS: A 12-point review of systems was obtained and pertinent positives and negatives were listed below. REVIEW OF SYSTEMS:     Constitutional: No fever, no chills, no lethargy, no weakness. HEENT:  No headache, otalgia, itchy eyes, nasal discharge or sore throat. Cardiac:  No chest pain, dyspnea, orthopnea or PND. Chest:   No cough, phlegm or wheezing. Abdomen:      Detailed by MA   Neuro:  No focal weakness, abnormal movements or seizure like activity. Skin:   No rashes, no itching. :   No hematuria, no pyuria, no dysuria, no flank pain. Extremities:  No swelling or joint pains. ROS was otherwise negative    Review of Systems   Constitutional:  Positive for fatigue. Negative for appetite change and unexpected weight change. HENT:  Negative for sore throat and trouble swallowing. Eyes: Negative. Respiratory:  Positive for shortness of breath. Negative for cough, choking and chest tightness. Cardiovascular:  Positive for chest pain. Negative for leg swelling. Gastrointestinal:  Positive for abdominal distention, abdominal pain, nausea and vomiting. Negative for anal bleeding, blood in stool, constipation, diarrhea and rectal pain. Endocrine: Negative. Genitourinary: Negative. Negative for difficulty urinating. Musculoskeletal: Negative. Skin: Negative. Allergic/Immunologic: Negative for environmental allergies and food allergies. Neurological:  Positive for dizziness, light-headedness and headaches. Negative for seizures and numbness. Hematological:  Bruises/bleeds easily. Psychiatric/Behavioral:  Negative for confusion and decreased concentration. The patient is nervous/anxious. PHYSICAL EXAMINATION: Vital signs reviewed per the nursing documentation. /76 (Site: Right Upper Arm, Position: Sitting, Cuff Size: Large Adult)   Ht 5' 10\" (1.778 m)   Wt 237 lb (107.5 kg)   BMI 34.01 kg/m²   Body mass index is 34.01 kg/m². Physical Exam    Physical Exam   Constitutional: Patient is oriented to person, place, and time. Patient appears well-developed and well-nourished. HENT:   Head: Normocephalic and atraumatic. Eyes: Pupils are equal, round, and reactive to light. EOM are normal.   Neck: Normal range of motion. Neck supple. No JVD present. No tracheal deviation present. No thyromegaly present. Cardiovascular: Normal rate, regular rhythm, normal heart sounds and intact distal pulses. Pulmonary/Chest: Effort normal and breath sounds normal. No stridor. No respiratory distress. He has no wheezes. He has no rales. He exhibits no tenderness. Abdominal: Soft. Bowel sounds are normal. He exhibits no distension and no mass. There is no tenderness. There is no rebound and no guarding. No hernia. Musculoskeletal: Normal range of motion. Lymphadenopathy:    Patient has no cervical adenopathy. Neurological: Patient is alert and oriented to person, place, and time. Psychiatric: Patient has a normal mood and affect.  Patient behavior is normal.       LABORATORY DATA: Reviewed  Lab Results   Component Value Date    WBC 7.9 05/25/2022    HGB 13.3 05/25/2022    HCT 42.5 05/25/2022    MCV 81.9 (L) 05/25/2022     05/25/2022     05/25/2022    K 4.4 05/25/2022     05/25/2022    CO2 26 05/25/2022    BUN 11 05/25/2022    CREATININE 0.78 05/25/2022    LABALBU 4.1 06/09/2021    BILITOT 0.5 06/09/2021    ALKPHOS 63 06/09/2021    AST 13 06/09/2021    ALT 16 06/09/2021    INR 1.0 10/14/2019         Lab Results   Component Value Date    RBC 5.19 (H) 05/25/2022    HGB 13.3 05/25/2022    MCV 81.9 (L) 05/25/2022    MCH 25.6 05/25/2022    MCHC 31.3 05/25/2022    RDW 13.1 05/25/2022    MPV 8.9 05/25/2022    BASOPCT 0 05/25/2022    LYMPHSABS 1.58 05/25/2022    MONOSABS 0.61 05/25/2022    NEUTROABS 5.59 05/25/2022    EOSABS 0.04 05/25/2022    BASOSABS 0.03 05/25/2022         DIAGNOSTIC TESTING:     No results found.       IMPRESSION:  Ms.Kasidy JUDIT Jane is a 22 y.o. female with a past history remarkable for chronic GERD symptoms with sharp stabbing throbbing pain in the epigastric region typically triggered by fried foods, referred for evaluation of the symptoms.  Patient has a family history of GERD, maternal grandfather with inflammatory bowel disease.  He denies any secondary weight loss.  Currently on Protonix 40 mg daily.  On Zofran as needed for intermittent nausea symptoms.  No recent endoscopic evaluation.    Assessment  1. Gastroesophageal reflux disease without esophagitis        Holley SPAIN was seen today for gastroesophageal reflux.    Diagnoses and all orders for this visit:    Gastroesophageal reflux disease without esophagitis-patient continue with Protonix 40 mg daily and will add 20 mg in the evening.  Avoid dietary triggers.  Plan for diagnostic upper endoscopy to evaluate for structural or luminal cause of the patient's symptoms such as peptic ulcer disease or erosive gastropathy.  We will send for celiac disease testing.  Patient advised to maintain a dietary log.  Education provided during this visit.  -     Celiac  Disease Panel; Future  -     EGD; Future    Other orders  -     pantoprazole (PROTONIX) 20 MG tablet; Take 1 tablet by mouth every evening    Risk, benefits, alternative discussed with the patient with guards to endoscopic procedure. She agreed to proceed with procedure. RTC: 3 months. Additional comments: Thank you for allowing me to participate in the care of Ms. Mckenzie De Los Santos. For any further questions please do not hesitate to contact me. I have reviewed and agree with the MA/LPN ROS please refer to their documentation from today's encounter on a separate note. Nilsa Corcoran MD, MPH   Board Certified in Gastroenterology  Board Certified in [address] [place] [address] #: [phone]          this note is created with the assistance of a speech recognition program.  While intending to generate a document that actually reflects the content of the visit, the document can still have some errors including those of syntax and sound a like substitutions which may escape proof reading. It such instances, actual meaning can be extrapolated by contextual diversion.

## 2023-03-13 ENCOUNTER — PATIENT MESSAGE (OUTPATIENT)
Dept: GASTROENTEROLOGY | Age: 23
End: 2023-03-13

## 2023-03-14 ENCOUNTER — TELEPHONE (OUTPATIENT)
Dept: GASTROENTEROLOGY | Age: 23
End: 2023-03-14

## 2023-03-14 NOTE — TELEPHONE ENCOUNTER
DARIA/Annia Renteria      5/1/23 @ 10:00am    Written/verbal instructions given @ vist    Procedure confirmation signed @ visit

## 2023-03-21 ENCOUNTER — OFFICE VISIT (OUTPATIENT)
Dept: FAMILY MEDICINE CLINIC | Age: 23
End: 2023-03-21

## 2023-03-21 ENCOUNTER — HOSPITAL ENCOUNTER (OUTPATIENT)
Age: 23
Setting detail: SPECIMEN
Discharge: HOME | End: 2023-03-21

## 2023-03-21 VITALS
SYSTOLIC BLOOD PRESSURE: 124 MMHG | RESPIRATION RATE: 16 BRPM | BODY MASS INDEX: 33.21 KG/M2 | HEART RATE: 96 BPM | TEMPERATURE: 97.6 F | HEIGHT: 70 IN | DIASTOLIC BLOOD PRESSURE: 86 MMHG | OXYGEN SATURATION: 98 % | WEIGHT: 232 LBS

## 2023-03-21 DIAGNOSIS — Z90.49 S/P CHOLECYSTECTOMY: ICD-10-CM

## 2023-03-21 DIAGNOSIS — K21.9 GASTROESOPHAGEAL REFLUX DISEASE WITHOUT ESOPHAGITIS: Primary | ICD-10-CM

## 2023-03-21 DIAGNOSIS — L70.9 ACNE, UNSPECIFIED ACNE TYPE: ICD-10-CM

## 2023-03-21 DIAGNOSIS — K21.9 GASTROESOPHAGEAL REFLUX DISEASE WITHOUT ESOPHAGITIS: ICD-10-CM

## 2023-03-21 LAB
ALBUMIN SERPL-MCNC: 4.5 G/DL (ref 3.5–5.2)
ALBUMIN/GLOBULIN RATIO: 1.2 (ref 1–2.5)
ALP SERPL-CCNC: 86 U/L (ref 35–104)
ALT SERPL-CCNC: 52 U/L (ref 5–33)
ANION GAP SERPL CALCULATED.3IONS-SCNC: 15 MMOL/L (ref 9–17)
AST SERPL-CCNC: 21 U/L
BILIRUB SERPL-MCNC: 0.3 MG/DL (ref 0.3–1.2)
BUN SERPL-MCNC: 10 MG/DL (ref 6–20)
CALCIUM SERPL-MCNC: 10.1 MG/DL (ref 8.6–10.4)
CHLORIDE SERPL-SCNC: 103 MMOL/L (ref 98–107)
CO2 SERPL-SCNC: 21 MMOL/L (ref 20–31)
CREAT SERPL-MCNC: 0.77 MG/DL (ref 0.5–0.9)
GFR SERPL CREATININE-BSD FRML MDRD: >60 ML/MIN/1.73M2
GLUCOSE SERPL-MCNC: 90 MG/DL (ref 70–99)
HCT VFR BLD AUTO: 45.3 % (ref 36.3–47.1)
HGB BLD-MCNC: 13.4 G/DL (ref 11.9–15.1)
LIPASE SERPL-CCNC: 31 U/L (ref 13–60)
MCH RBC QN AUTO: 24.9 PG (ref 25.2–33.5)
MCHC RBC AUTO-ENTMCNC: 29.6 G/DL (ref 28.4–34.8)
MCV RBC AUTO: 84.2 FL (ref 82.6–102.9)
NRBC AUTOMATED: 0 PER 100 WBC
PDW BLD-RTO: 13.8 % (ref 11.8–14.4)
PLATELET # BLD AUTO: 349 K/UL (ref 138–453)
PMV BLD AUTO: 10.5 FL (ref 8.1–13.5)
POTASSIUM SERPL-SCNC: 4.6 MMOL/L (ref 3.7–5.3)
PROT SERPL-MCNC: 8.2 G/DL (ref 6.4–8.3)
RBC # BLD: 5.38 M/UL (ref 3.95–5.11)
SODIUM SERPL-SCNC: 139 MMOL/L (ref 135–144)
WBC # BLD AUTO: 9.5 K/UL (ref 4.5–13.5)

## 2023-03-21 RX ORDER — OXYCODONE HYDROCHLORIDE 5 MG/1
5 TABLET ORAL EVERY 8 HOURS PRN
Qty: 15 TABLET | Refills: 0 | Status: SHIPPED | OUTPATIENT
Start: 2023-03-21 | End: 2023-03-26

## 2023-03-21 RX ORDER — TRETINOIN 0.1 MG/G
GEL TOPICAL
Qty: 45 G | Refills: 1 | Status: SHIPPED | OUTPATIENT
Start: 2023-03-21

## 2023-03-21 RX ORDER — ONDANSETRON 4 MG/1
4 TABLET, ORALLY DISINTEGRATING ORAL 3 TIMES DAILY PRN
Qty: 15 TABLET | Refills: 0 | Status: SHIPPED | OUTPATIENT
Start: 2023-03-21

## 2023-03-21 SDOH — ECONOMIC STABILITY: FOOD INSECURITY: WITHIN THE PAST 12 MONTHS, THE FOOD YOU BOUGHT JUST DIDN'T LAST AND YOU DIDN'T HAVE MONEY TO GET MORE.: NEVER TRUE

## 2023-03-21 SDOH — ECONOMIC STABILITY: INCOME INSECURITY: HOW HARD IS IT FOR YOU TO PAY FOR THE VERY BASICS LIKE FOOD, HOUSING, MEDICAL CARE, AND HEATING?: NOT HARD AT ALL

## 2023-03-21 SDOH — ECONOMIC STABILITY: HOUSING INSECURITY
IN THE LAST 12 MONTHS, WAS THERE A TIME WHEN YOU DID NOT HAVE A STEADY PLACE TO SLEEP OR SLEPT IN A SHELTER (INCLUDING NOW)?: NO

## 2023-03-21 SDOH — ECONOMIC STABILITY: FOOD INSECURITY: WITHIN THE PAST 12 MONTHS, YOU WORRIED THAT YOUR FOOD WOULD RUN OUT BEFORE YOU GOT MONEY TO BUY MORE.: NEVER TRUE

## 2023-03-21 ASSESSMENT — PATIENT HEALTH QUESTIONNAIRE - PHQ9
SUM OF ALL RESPONSES TO PHQ QUESTIONS 1-9: 0
3. TROUBLE FALLING OR STAYING ASLEEP: 0
1. LITTLE INTEREST OR PLEASURE IN DOING THINGS: 0
SUM OF ALL RESPONSES TO PHQ9 QUESTIONS 1 & 2: 0
5. POOR APPETITE OR OVEREATING: 0
4. FEELING TIRED OR HAVING LITTLE ENERGY: 0
10. IF YOU CHECKED OFF ANY PROBLEMS, HOW DIFFICULT HAVE THESE PROBLEMS MADE IT FOR YOU TO DO YOUR WORK, TAKE CARE OF THINGS AT HOME, OR GET ALONG WITH OTHER PEOPLE: 0
SUM OF ALL RESPONSES TO PHQ QUESTIONS 1-9: 0
2. FEELING DOWN, DEPRESSED OR HOPELESS: 0
SUM OF ALL RESPONSES TO PHQ QUESTIONS 1-9: 0
9. THOUGHTS THAT YOU WOULD BE BETTER OFF DEAD, OR OF HURTING YOURSELF: 0
8. MOVING OR SPEAKING SO SLOWLY THAT OTHER PEOPLE COULD HAVE NOTICED. OR THE OPPOSITE, BEING SO FIGETY OR RESTLESS THAT YOU HAVE BEEN MOVING AROUND A LOT MORE THAN USUAL: 0
SUM OF ALL RESPONSES TO PHQ QUESTIONS 1-9: 0
7. TROUBLE CONCENTRATING ON THINGS, SUCH AS READING THE NEWSPAPER OR WATCHING TELEVISION: 0
6. FEELING BAD ABOUT YOURSELF - OR THAT YOU ARE A FAILURE OR HAVE LET YOURSELF OR YOUR FAMILY DOWN: 0

## 2023-03-21 ASSESSMENT — ENCOUNTER SYMPTOMS
COUGH: 0
SINUS PAIN: 0
NAUSEA: 1
ABDOMINAL PAIN: 1
SORE THROAT: 0
VOMITING: 0
DIARRHEA: 0
SHORTNESS OF BREATH: 0

## 2023-03-21 NOTE — LETTER
March 21, 2023       Vertell Lamp YOB: 2000   [address] [address] [id number] Date of Visit:  3/21/2023       To Whom It May Concern: It is my medical opinion that Vertell Lamp is excused from work until otherwise specified. If you have any questions or concerns, please don't hesitate to call.     Sincerely,        URIEL Lewis - CNP

## 2023-03-21 NOTE — PROGRESS NOTES
Visit Information    Have you changed or started any medications since your last visit including any over-the-counter medicines, vitamins, or herbal medicines? no   Are you having any side effects from any of your medications? -  no  Have you stopped taking any of your medications? Is so, why? -  no    Have you seen any other physician or provider since your last visit? Yes, records obtained   Have you had any other diagnostic tests since your last visit? Yes - Records Obtained  Have you been seen in the emergency room and/or had an admission to a hospital since we last saw you? Yes - Records Obtained  Have you had your routine dental cleaning in the past 6 months? yes -     Have you activated your [employer] account? If not, what are your barriers?  Yes     Patient Care Team:  URIEL Lopez CNP as PCP - General (Certified Nurse Practitioner)  URIEL Lopez CNP as PCP - Empaneled Provider    Medical History Review  Past Medical, Family, and Social History reviewed and does contribute to the patient presenting condition    Health Maintenance   Topic Date Due    COVID-19 Vaccine (1) Never done    HIV screen  Never done    Flu vaccine (1) 08/01/2022    Pap smear  Never done    Depression Monitoring  01/08/2023    DTaP/Tdap/Td vaccine (7 - Td or Tdap) 06/04/2023    Chlamydia/GC screen  12/30/2023    Hepatitis A vaccine  Completed    Hib vaccine  Completed    HPV vaccine  Completed    Varicella vaccine  Completed    Hepatitis C screen  Completed    Meningococcal (ACWY) vaccine  Aged Out    Pneumococcal 0-64 years Vaccine  Aged Out
(ADVIL;MOTRIN) 600 MG tablet as needed      triamcinolone (KENALOG) 0.1 % cream Apply topically 2 times daily. 30 g 0    azelastine (ASTELIN) 0.1 % nasal spray 2 sprays by Nasal route 2 times daily Use in each nostril as directed 60 mL 0    vitamin D3 (CHOLECALCIFEROL) 25 MCG (1000 UT) TABS tablet Take 1 tablet by mouth daily 30 tablet 2    albuterol sulfate HFA (VENTOLIN HFA) 108 (90 Base) MCG/ACT inhaler Inhale 2 puffs into the lungs every 6 hours as needed for Wheezing 1 Inhaler 2    pantoprazole (PROTONIX) 20 MG tablet Take 1 tablet by mouth every evening (Patient not taking: Reported on 3/21/2023) 30 tablet 3    escitalopram (LEXAPRO) 10 MG tablet Take 1 tablet by mouth daily (Patient not taking: Reported on 3/21/2023) 30 tablet 5    ondansetron (ZOFRAN) 4 MG tablet Take 1 tablet by mouth every 8 hours as needed for Nausea or Vomiting (1- 2TABS EVERY 8 HOURS PRN) (Patient not taking: Reported on 3/21/2023) 30 tablet 0     No current facility-administered medications for this visit. Allergies   Allergen Reactions    Prednisone Shortness Of Breath       Subjective:      Review of Systems   Constitutional:  Negative for chills and fever. HENT:  Negative for ear pain, sinus pain and sore throat. Respiratory:  Negative for cough and shortness of breath. Cardiovascular:  Negative for chest pain and palpitations. Gastrointestinal:  Positive for abdominal pain and nausea. Negative for diarrhea and vomiting. Neurological:  Negative for dizziness and headaches. All other systems reviewed and are negative.    :Objective     Physical Exam  Vitals and nursing note reviewed. Cardiovascular:      Rate and Rhythm: Normal rate. Pulmonary:      Effort: Pulmonary effort is normal.      Breath sounds: Normal breath sounds. Abdominal:      General: Bowel sounds are normal. There is no distension. Palpations: Abdomen is soft. Tenderness: There is abdominal tenderness.           Comments: Incisions

## 2023-05-01 ENCOUNTER — ANESTHESIA (OUTPATIENT)
Dept: OPERATING ROOM | Age: 23
End: 2023-05-01
Payer: COMMERCIAL

## 2023-05-01 ENCOUNTER — ANESTHESIA EVENT (OUTPATIENT)
Dept: OPERATING ROOM | Age: 23
End: 2023-05-01
Payer: COMMERCIAL

## 2023-05-01 ENCOUNTER — HOSPITAL ENCOUNTER (OUTPATIENT)
Age: 23
Setting detail: OUTPATIENT SURGERY
Discharge: HOME | End: 2023-05-01
Attending: INTERNAL MEDICINE | Admitting: INTERNAL MEDICINE
Payer: COMMERCIAL

## 2023-05-01 VITALS
BODY MASS INDEX: 30.06 KG/M2 | OXYGEN SATURATION: 100 % | RESPIRATION RATE: 16 BRPM | TEMPERATURE: 97 F | HEIGHT: 70 IN | WEIGHT: 210 LBS | SYSTOLIC BLOOD PRESSURE: 133 MMHG | HEART RATE: 76 BPM | DIASTOLIC BLOOD PRESSURE: 75 MMHG

## 2023-05-01 DIAGNOSIS — R10.13 DYSPEPSIA: ICD-10-CM

## 2023-05-01 PROBLEM — K29.50 MILD CHRONIC GASTRITIS: Status: ACTIVE | Noted: 2023-05-01

## 2023-05-01 LAB — HCG, PREGNANCY URINE (POC): NEGATIVE

## 2023-05-01 PROCEDURE — 88342 IMHCHEM/IMCYTCHM 1ST ANTB: CPT

## 2023-05-01 PROCEDURE — 2580000003 HC RX 258: Performed by: SPECIALIST

## 2023-05-01 PROCEDURE — 2709999900 HC NON-CHARGEABLE SUPPLY: Performed by: INTERNAL MEDICINE

## 2023-05-01 PROCEDURE — 3700000001 HC ADD 15 MINUTES (ANESTHESIA): Performed by: INTERNAL MEDICINE

## 2023-05-01 PROCEDURE — 2500000003 HC RX 250 WO HCPCS: Performed by: SPECIALIST

## 2023-05-01 PROCEDURE — 81025 URINE PREGNANCY TEST: CPT

## 2023-05-01 PROCEDURE — 7100000011 HC PHASE II RECOVERY - ADDTL 15 MIN: Performed by: INTERNAL MEDICINE

## 2023-05-01 PROCEDURE — 3609012400 HC EGD TRANSORAL BIOPSY SINGLE/MULTIPLE: Performed by: INTERNAL MEDICINE

## 2023-05-01 PROCEDURE — 43239 EGD BIOPSY SINGLE/MULTIPLE: CPT | Performed by: INTERNAL MEDICINE

## 2023-05-01 PROCEDURE — 6360000002 HC RX W HCPCS: Performed by: SPECIALIST

## 2023-05-01 PROCEDURE — 3700000000 HC ANESTHESIA ATTENDED CARE: Performed by: INTERNAL MEDICINE

## 2023-05-01 PROCEDURE — 7100000010 HC PHASE II RECOVERY - FIRST 15 MIN: Performed by: INTERNAL MEDICINE

## 2023-05-01 PROCEDURE — 2580000003 HC RX 258: Performed by: STUDENT IN AN ORGANIZED HEALTH CARE EDUCATION/TRAINING PROGRAM

## 2023-05-01 PROCEDURE — 88305 TISSUE EXAM BY PATHOLOGIST: CPT

## 2023-05-01 RX ORDER — PROPOFOL 10 MG/ML
INJECTION, EMULSION INTRAVENOUS PRN
Status: DISCONTINUED | OUTPATIENT
Start: 2023-05-01 | End: 2023-05-01

## 2023-05-01 RX ORDER — SODIUM CHLORIDE, SODIUM LACTATE, POTASSIUM CHLORIDE, CALCIUM CHLORIDE 600; 310; 30; 20 MG/100ML; MG/100ML; MG/100ML; MG/100ML
INJECTION, SOLUTION INTRAVENOUS CONTINUOUS PRN
Status: DISCONTINUED | OUTPATIENT
Start: 2023-05-01 | End: 2023-05-01 | Stop reason: SDUPTHER

## 2023-05-01 RX ORDER — MIDAZOLAM HYDROCHLORIDE 1 MG/ML
INJECTION INTRAMUSCULAR; INTRAVENOUS PRN
Status: DISCONTINUED | OUTPATIENT
Start: 2023-05-01 | End: 2023-05-01 | Stop reason: SDUPTHER

## 2023-05-01 RX ORDER — SODIUM CHLORIDE, SODIUM LACTATE, POTASSIUM CHLORIDE, CALCIUM CHLORIDE 600; 310; 30; 20 MG/100ML; MG/100ML; MG/100ML; MG/100ML
INJECTION, SOLUTION INTRAVENOUS CONTINUOUS
Status: DISCONTINUED | OUTPATIENT
Start: 2023-05-01 | End: 2023-05-01 | Stop reason: HOSPADM

## 2023-05-01 RX ORDER — LIDOCAINE HYDROCHLORIDE 10 MG/ML
INJECTION, SOLUTION EPIDURAL; INFILTRATION; INTRACAUDAL; PERINEURAL PRN
Status: DISCONTINUED | OUTPATIENT
Start: 2023-05-01 | End: 2023-05-01 | Stop reason: SDUPTHER

## 2023-05-01 RX ORDER — PROPOFOL 10 MG/ML
INJECTION, EMULSION INTRAVENOUS PRN
Status: DISCONTINUED | OUTPATIENT
Start: 2023-05-01 | End: 2023-05-01 | Stop reason: SDUPTHER

## 2023-05-01 RX ORDER — SODIUM CHLORIDE 0.9 % (FLUSH) 0.9 %
5-40 SYRINGE (ML) INJECTION PRN
Status: DISCONTINUED | OUTPATIENT
Start: 2023-05-01 | End: 2023-05-01 | Stop reason: HOSPADM

## 2023-05-01 RX ORDER — SODIUM CHLORIDE 0.9 % (FLUSH) 0.9 %
5-40 SYRINGE (ML) INJECTION EVERY 12 HOURS SCHEDULED
Status: DISCONTINUED | OUTPATIENT
Start: 2023-05-01 | End: 2023-05-01 | Stop reason: HOSPADM

## 2023-05-01 RX ORDER — SODIUM CHLORIDE 9 MG/ML
INJECTION, SOLUTION INTRAVENOUS PRN
Status: DISCONTINUED | OUTPATIENT
Start: 2023-05-01 | End: 2023-05-01 | Stop reason: HOSPADM

## 2023-05-01 RX ADMIN — PROPOFOL 40 MG: 10 INJECTION, EMULSION INTRAVENOUS at 10:50

## 2023-05-01 RX ADMIN — SODIUM CHLORIDE, POTASSIUM CHLORIDE, SODIUM LACTATE AND CALCIUM CHLORIDE: 600; 310; 30; 20 INJECTION, SOLUTION INTRAVENOUS at 09:11

## 2023-05-01 RX ADMIN — LIDOCAINE HYDROCHLORIDE 25 MG: 10 INJECTION, SOLUTION EPIDURAL; INFILTRATION; INTRACAUDAL; PERINEURAL at 10:44

## 2023-05-01 RX ADMIN — PROPOFOL 100 MG: 10 INJECTION, EMULSION INTRAVENOUS at 10:44

## 2023-05-01 RX ADMIN — SODIUM CHLORIDE, POTASSIUM CHLORIDE, SODIUM LACTATE AND CALCIUM CHLORIDE: 600; 310; 30; 20 INJECTION, SOLUTION INTRAVENOUS at 08:50

## 2023-05-01 RX ADMIN — MIDAZOLAM 2 MG: 1 INJECTION INTRAMUSCULAR; INTRAVENOUS at 10:30

## 2023-05-01 ASSESSMENT — PAIN - FUNCTIONAL ASSESSMENT
PAIN_FUNCTIONAL_ASSESSMENT: ACTIVITIES ARE NOT PREVENTED
PAIN_FUNCTIONAL_ASSESSMENT: 0-10

## 2023-05-01 ASSESSMENT — ENCOUNTER SYMPTOMS: SHORTNESS OF BREATH: 0

## 2023-05-01 ASSESSMENT — PAIN DESCRIPTION - DESCRIPTORS: DESCRIPTORS: CRAMPING;BURNING

## 2023-05-01 NOTE — OP NOTE
Operative Note      Patient: Blake Oneil  YOB: 2000  MRN: [id number]    Date of Procedure: 5/1/2023    Pre-Op Diagnosis Codes: * Dyspepsia [R10.13]    Post-Op Diagnosis: Mild gastritis       Procedure(s):  EGD BIOPSY    Surgeon(s):  Lisa Lange MD    Assistant:   First Assistant: Olga Rhodes RN    Anesthesia: Monitor Anesthesia Care    Estimated Blood Loss (mL): Minimal    Complications: None    Specimens:   ID Type Source Tests Collected by Time Destination   A : gastric bx Tissue Stomach SURGICAL PATHOLOGY Lisa Lange MD 5/1/2023 1047    B : distal esophagus bx  Tissue Esophagus SURGICAL PATHOLOGY Lisa Lange MD 5/1/2023 1048    C : proximal esophagus bx  Tissue Esophagus SURGICAL PATHOLOGY Lisa Lange MD 5/1/2023 1048        Implants:  * No implants in log *      Drains: * No LDAs found *            [facility] ENDOSCOPY     EGD    PROCEDURE DATE: 05/01/23    REFERRING PHYSICIAN: No ref. provider found     PRIMARY CARE PROVIDER: URIEL Arriola - [facility]    ATTENDING PHYSICIAN: Lisa Lange MD     HISTORY: Ms. Blake Oneil is a 25 y.o. female who presents to the [facility] Endoscopy unit for upper endoscopy. The patient's clinical history is remarkable for GERD, anxiety, dyspepsia and intermittent dysphagia . She is currently medically stable and appropriate for the planned procedure. PREOPERATIVE DIAGNOSIS: GERD and dyspepsia. PROCEDURES:   1) Transoral Upper Endoscopy with cold biopsy. POSTOPERATIVE DIAGNOSIS:     1-normal-appearing subcu mucosa, no gross esophageal strictures, no signs of reflux esophagitis. Cold biopsy of proximal and of distal esophagus taken to evaluate eosinophilic esophagitis  2-mild scattered areas of erythema and congestion identified in the gastric body and antrum. Cold biopsy of the stomach taken for H. pylori testing. No ulcerations no erosions.   3-normal appearing duodenal mucosa    MEDICATIONS:   MAC per anesthesia     EBL:

## 2023-05-01 NOTE — DISCHARGE INSTRUCTIONS
[facility]    POST-ENDOSCOPY INSTRUCTIONS:    1. ACTIVITY   No driving, operating machinery, or making important decisions for 24 hours. Resume normal activity after 24 hours. You may return to work after 24 hours. 2. DIET     ____ (EGD/ERCP):  Do not eat or drink for one hour after your exam.  You may then   try a sip of water, and if you are able to swallow as usual you may advance to a   regular diet. ____ (Colonscopy/Flex Sig):  Resume your usual diet unless specified below. ____ Diet Modification:***    3. MEDICATIONS (Do not consume alcohol, tranquilizers, or sleeping medications for 24           hours unless advised by your physician)       _____ Resume your usual medications    4. PHYSICIAN FOLLOW-UP        ____ Please call the office for an appointment/further instructions. ***        ____ See your family physician. 5. ADDITIONAL INSTRUCTIONS      ***    6. NORMAL CHANGES YOU MAY EXPERIENCE AFTER ENDOSCOPY:          EGD/ERCP     COLONOSCOPY      Sore throat after EGD/ERCP   Passing of gas for several hours after      A bloated feeling and belching from  Some mild abdominal cramping   air in stomach    If a biopsy/polypectomy was done, you      If a biopsy was done, you may spit   may see some spotting of blood   up some blood tinged mucous  You may feel fatigued for the next 24-48         hours due to the prep and sedation    7. CALL YOUR PHYSICIAN IF YOU EXPERIENCE ANY OF THE FOLLOWING:      A.  Passing blood rectally or vomiting blood (color may be red or black)      B. Severe abdominal pain or tenderness (that is not relieved by passing air)      C.   Fever, chills, or excessive sweating      D.  Persistent nausea or vomiting      E.  Redness or swelling at the IV site    If you have additional questions, PLEASE call your doctor @ _______________________ or the [address] [place] [address] Ana Zaidi office at [phone]    No alcoholic beverages, no driving or operating machinery, no making important

## 2023-05-01 NOTE — H&P
History and Physical    Pt Name: Dre Duncan  MRN: [id number]  YOB: 2000  Date of evaluation: 5/1/2023  Primary Care Physician: URIEL Ortiz CNP    SUBJECTIVE:   History of Chief Complaint:    Dre Duncan is a 25 y.o. female who is scheduled today for EGD ESOPHAGOGASTRODUODENOSCOPY. She reports history of GERD- \"very bad\" and has been on Pantoprazole the past couple of months (previously on Omeprazole) and that at times it can be hard to breathe due to GERD symptoms. She also reports history of \"stomach pains\". She reports prior endoscopy (2018 per epic). And recent cholecystectomy in March 2023. Allergies  is allergic to prednisone. Medications  Prior to Admission medications    Medication Sig Start Date End Date Taking? Authorizing Provider   CHLOROPHYLL PO Take by mouth   Yes Historical Provider, MD   ondansetron (ZOFRAN-ODT) 4 MG disintegrating tablet Take 1 tablet by mouth 3 times daily as needed for Nausea or Vomiting 3/21/23   URIEL Ortiz CNP   tretinoin (RETIN-A) 0.01 % gel Apply topically nightly. Patient not taking: Reported on 5/1/2023 3/21/23   URIEL Ortiz CNP   FENUGREEK PO Take by mouth    Historical Provider, MD   UNABLE TO [address] [facility]    Historical Provider, MD   pantoprazole (PROTONIX) 40 MG tablet Take 1 tablet by mouth every morning (before breakfast) 2/13/23   URIEL Ortiz CNP   hydrOXYzine HCl (ATARAX) 50 MG tablet Take 1 tablet by mouth every 8 hours as needed for Anxiety 12/1/22   URIEL Ortiz CNP   ibuprofen (ADVIL;MOTRIN) 600 MG tablet as needed 8/8/22   Historical Provider, MD   triamcinolone (KENALOG) 0.1 % cream Apply topically 2 times daily.  5/26/22   Devorah Ovalles PA-C   azelastine (ASTELIN) 0.1 % nasal spray 2 sprays by Nasal route 2 times daily Use in each nostril as directed 10/7/21   URIEL Friedman CNP   vitamin D3 (CHOLECALCIFEROL) 25 MCG (1000 UT) TABS tablet Take 1 tablet by

## 2023-05-01 NOTE — ANESTHESIA POSTPROCEDURE EVALUATION
Department of Anesthesiology  Postprocedure Note    Patient: Gabriela Levine  MRN: [id number]  YOB: 2000  Date of evaluation: 5/1/2023      Procedure Summary     Date: 05/01/23 Room / Location: [address]    Anesthesia Start: 1026 Anesthesia Stop: 1100    Procedure: EGD BIOPSY Diagnosis:       Dyspepsia      (DYSPEPSIA)    Surgeons: Chauncey Funez MD Responsible Provider: Khoa Goss MD    Anesthesia Type: MAC ASA Status: 2          Anesthesia Type: No value filed.     Mukul Phase I:      Mukul Phase II: Mukul Score: 8      Anesthesia Post Evaluation    Patient location during evaluation: bedside  Patient participation: complete - patient participated  Level of consciousness: awake  Airway patency: patent  Nausea & Vomiting: no nausea and no vomiting  Complications: no  Cardiovascular status: hemodynamically stable  Respiratory status: acceptable  Hydration status: stable  Comments: /75   Pulse 76   Temp 97 °F (36.1 °C) (Temporal)   Resp 16   Ht 5' 10\" (1.778 m)   Wt 210 lb (95.3 kg)   LMP 02/27/2023 Comment: Urine Pregnancy Test done 5/1/2023 @0824 was negative  SpO2 100%   BMI 30.13 kg/m²

## 2023-05-01 NOTE — ANESTHESIA PRE PROCEDURE
Department of Anesthesiology  Preprocedure Note       Name:  Ilana Fish   Age:  25 y.o.  :  2000                                          MRN:  [id number]         Date:  2023      Surgeon: Stephanie Neville):  Deidre Brewster MD    Procedure: Procedure(s):  EGD BIOPSY - GI SCHEDULED    Medications prior to admission:   Prior to Admission medications    Medication Sig Start Date End Date Taking? Authorizing Provider   CHLOROPHYLL PO Take by mouth    Historical Provider, MD   ondansetron (ZOFRAN-ODT) 4 MG disintegrating tablet Take 1 tablet by mouth 3 times daily as needed for Nausea or Vomiting 3/21/23   Albertina President, APRN - CNP   tretinoin (RETIN-A) 0.01 % gel Apply topically nightly. Patient not taking: Reported on 2023 3/21/23   URIEL Torres CNP   FENUGREEK PO Take by mouth    Historical Provider, MD   UNABLE TO [address]    Historical Provider, MD   pantoprazole (PROTONIX) 40 MG tablet Take 1 tablet by mouth every morning (before breakfast) 23   UREIL Torres CNP   hydrOXYzine HCl (ATARAX) 50 MG tablet Take 1 tablet by mouth every 8 hours as needed for Anxiety 22   URIEL Torres CNP   ibuprofen (ADVIL;MOTRIN) 600 MG tablet as needed 22   Historical Provider, MD   triamcinolone (KENALOG) 0.1 % cream Apply topically 2 times daily. 22   Devorah Ovalles PA-C   azelastine (ASTELIN) 0.1 % nasal spray 2 sprays by Nasal route 2 times daily Use in each nostril as directed 10/7/21   URIEL Patel CNP   vitamin D3 (CHOLECALCIFEROL) 25 MCG (1000 UT) TABS tablet Take 1 tablet by mouth daily 21   URIEL Torres CNP   albuterol sulfate HFA (VENTOLIN HFA) 108 (90 Base) MCG/ACT inhaler Inhale 2 puffs into the lungs every 6 hours as needed for Wheezing 18   Marge Zurita DO       Current medications:    No current outpatient medications on file. No current facility-administered medications for this visit.

## 2023-05-03 LAB — SURGICAL PATHOLOGY REPORT: NORMAL

## 2023-05-08 ENCOUNTER — PATIENT MESSAGE (OUTPATIENT)
Dept: FAMILY MEDICINE CLINIC | Age: 23
End: 2023-05-08

## 2023-05-09 RX ORDER — ALBUTEROL SULFATE 90 UG/1
2 AEROSOL, METERED RESPIRATORY (INHALATION) EVERY 6 HOURS PRN
Qty: 1 EACH | Refills: 2 | Status: SHIPPED | OUTPATIENT
Start: 2023-05-09

## 2023-05-09 NOTE — TELEPHONE ENCOUNTER
From: Damir Watkins  To: Delon Bonner  Sent: 5/8/2023 11:38 PM EDT  Subject: Albuterol     Devin Herrera,  My sports induced asthma has been acting up more recently and I was hoping to get a refill for my albuterol inhaler.     Thank you,  Az Borden

## 2023-05-11 ENCOUNTER — TELEPHONE (OUTPATIENT)
Dept: FAMILY MEDICINE CLINIC | Age: 23
End: 2023-05-11

## 2023-05-11 DIAGNOSIS — J45.909 REACTIVE AIRWAY DISEASE WITHOUT COMPLICATION, UNSPECIFIED ASTHMA SEVERITY, UNSPECIFIED WHETHER PERSISTENT: Primary | ICD-10-CM

## 2023-05-11 RX ORDER — ALBUTEROL SULFATE 90 UG/1
2 AEROSOL, METERED RESPIRATORY (INHALATION) EVERY 6 HOURS PRN
Qty: 18 G | Refills: 3 | Status: SHIPPED | OUTPATIENT
Start: 2023-05-11

## 2023-05-11 NOTE — TELEPHONE ENCOUNTER
Patient's insurance will not cover the Albuterol Sulfate HFA  Requires a try and fail of   Brand Ventolin  Brand Proventil

## 2023-07-10 ENCOUNTER — OFFICE VISIT (OUTPATIENT)
Dept: PRIMARY CARE CLINIC | Age: 23
End: 2023-07-10
Payer: COMMERCIAL

## 2023-07-10 ENCOUNTER — HOSPITAL ENCOUNTER (OUTPATIENT)
Age: 23
Setting detail: SPECIMEN
Discharge: HOME | End: 2023-07-10

## 2023-07-10 VITALS
OXYGEN SATURATION: 95 % | SYSTOLIC BLOOD PRESSURE: 115 MMHG | TEMPERATURE: 97.1 F | DIASTOLIC BLOOD PRESSURE: 81 MMHG | HEART RATE: 84 BPM

## 2023-07-10 DIAGNOSIS — Z20.828 EXPOSURE TO HERPES: ICD-10-CM

## 2023-07-10 DIAGNOSIS — R39.9 UTI SYMPTOMS: ICD-10-CM

## 2023-07-10 DIAGNOSIS — N30.01 ACUTE CYSTITIS WITH HEMATURIA: Primary | ICD-10-CM

## 2023-07-10 DIAGNOSIS — Z11.3 SCREEN FOR STD (SEXUALLY TRANSMITTED DISEASE): ICD-10-CM

## 2023-07-10 DIAGNOSIS — N89.8 VAGINAL DISCHARGE: ICD-10-CM

## 2023-07-10 LAB
BILIRUBIN, POC: ABNORMAL
BLOOD URINE, POC: ABNORMAL
CLARITY, POC: CLEAR
COLOR, POC: YELLOW
CONTROL: NORMAL
GLUCOSE URINE, POC: ABNORMAL
KETONES, POC: ABNORMAL
LEUKOCYTE EST, POC: ABNORMAL
NITRITE, POC: ABNORMAL
PH, POC: 6
PREGNANCY TEST URINE, POC: NORMAL
PROTEIN, POC: 100
SPECIFIC GRAVITY, POC: 1.02
UROBILINOGEN, POC: 0.2

## 2023-07-10 PROCEDURE — 99213 OFFICE O/P EST LOW 20 MIN: CPT | Performed by: FAMILY MEDICINE

## 2023-07-10 PROCEDURE — 81002 URINALYSIS NONAUTO W/O SCOPE: CPT | Performed by: FAMILY MEDICINE

## 2023-07-10 PROCEDURE — 81025 URINE PREGNANCY TEST: CPT | Performed by: FAMILY MEDICINE

## 2023-07-10 RX ORDER — NITROFURANTOIN 25; 75 MG/1; MG/1
100 CAPSULE ORAL 2 TIMES DAILY
Qty: 10 CAPSULE | Refills: 0 | Status: SHIPPED | OUTPATIENT
Start: 2023-07-10 | End: 2023-07-15

## 2023-07-10 RX ORDER — NORELGESTROMIN AND ETHINYL ESTRADIOL 150; 35 UG/D; UG/D
1 PATCH TRANSDERMAL WEEKLY
COMMUNITY
Start: 2023-06-23

## 2023-07-10 RX ORDER — PHENAZOPYRIDINE HYDROCHLORIDE 200 MG/1
200 TABLET, FILM COATED ORAL 3 TIMES DAILY PRN
Qty: 15 TABLET | Refills: 0 | Status: SHIPPED | OUTPATIENT
Start: 2023-07-10 | End: 2023-07-13

## 2023-07-10 NOTE — PROGRESS NOTES
[address] [facility] [place] [facility]  [address] [address]  Dept: [phone]  Dept Fax: [phone]    Celia Figueroa is a 25 y.o. female who presents today for her medical conditions/complaintsas noted below. Celia Figueroa is c/o of Urinary Tract Infection (Burning, frequency, back pain, abdominal pain,x 1 day ) and Vaginal Discharge (Mucus colored, burning, itching)        HPI:     Urinary Tract Infection  This is a new problem. The current episode started yesterday. The problem is unchanged. Associated symptoms include pain. Pertinent negatives include no hematuria. (Body aches, Fevers, chills, nausea, vomiting, Frequency, itching, discharge) The pain is present in the abdomen, urethra and back.    Tried aleve and leftover macrobid for symptoms  LMP: 6/19/23  NO known sick contacts  Past Medical History:   Diagnosis Date    Anxiety     Asthma     Cholecystitis     COVID 04/27/2023    states has had covid 4-5 times---never hospitalized    COVID-19 vaccine series not administered     Cyst of ovary, right     multiple times    Depression     Eczema     GERD (gastroesophageal reflux disease)     Headache     History of palpitations     Hx of chest pain     Hypoglycemia     IBS (irritable bowel syndrome)     Iron malabsorption     hx iron transfusions    Neurocardiogenic syncope     Pneumonia     at age 5    PONV (postoperative nausea and vomiting)     Prolonged emergence from general anesthesia     Reflux esophagitis     Seasonal allergies     Seizure-like activity ([address])     pt denies diagnosis of Seizures, states it was an episode x 1 many years ago of shaking and sweating and lost consciousness-she does not feel the episode was a seizure and reports she was never on Rx for sz    Past medical history reviewed and pertinent positives/negatives in the HPI    Past Surgical History:   Procedure Laterality Date    CHEST TUBE INSERTION Left

## 2023-07-10 NOTE — PATIENT INSTRUCTIONS
Urine in office shows possible UTI. Take antibiotic as prescribed for UTI. Will send urine for culture and call if antibiotic change is needed.  Will send urine for gonorrhea/chlamydia testing  Will send vaginal swab for culture and call with results  Have blood work done to assess for herpes and other STDs  If symptoms worsen or do not improve please follow-up with PCP or return to clinic

## 2023-07-11 DIAGNOSIS — N89.8 VAGINAL DISCHARGE: ICD-10-CM

## 2023-07-11 DIAGNOSIS — R39.9 UTI SYMPTOMS: ICD-10-CM

## 2023-07-11 DIAGNOSIS — Z11.3 SCREEN FOR STD (SEXUALLY TRANSMITTED DISEASE): ICD-10-CM

## 2023-07-11 LAB
CANDIDA SPECIES, DNA PROBE: POSITIVE
CHLAMYDIA DNA UR QL NAA+PROBE: NEGATIVE
GARDNERELLA VAGINALIS, DNA PROBE: NEGATIVE
N GONORRHOEA DNA UR QL NAA+PROBE: NEGATIVE
SOURCE: ABNORMAL
SPECIMEN DESCRIPTION: NORMAL
TRICHOMONAS VAGINALIS DNA: NEGATIVE

## 2023-07-12 LAB
MICROORGANISM SPEC CULT: ABNORMAL
SPECIMEN DESCRIPTION: ABNORMAL

## 2023-07-12 RX ORDER — FLUCONAZOLE 150 MG/1
150 TABLET ORAL ONCE
Qty: 2 TABLET | Refills: 0 | Status: SHIPPED | OUTPATIENT
Start: 2023-07-12 | End: 2023-07-12

## 2023-07-19 LAB
ANTIBODY: NORMAL
ANTIGEN INTERPRETATION: NORMAL
HIV AG/AB: NORMAL

## 2023-07-25 ENCOUNTER — OFFICE VISIT (OUTPATIENT)
Dept: PRIMARY CARE CLINIC | Age: 23
End: 2023-07-25
Payer: COMMERCIAL

## 2023-07-25 ENCOUNTER — HOSPITAL ENCOUNTER (OUTPATIENT)
Age: 23
Setting detail: SPECIMEN
Discharge: HOME | End: 2023-07-25

## 2023-07-25 VITALS — TEMPERATURE: 97.6 F | SYSTOLIC BLOOD PRESSURE: 117 MMHG | DIASTOLIC BLOOD PRESSURE: 78 MMHG

## 2023-07-25 DIAGNOSIS — Z32.00 POSSIBLE PREGNANCY, NOT CONFIRMED: ICD-10-CM

## 2023-07-25 DIAGNOSIS — L30.9 DERMATITIS: Primary | ICD-10-CM

## 2023-07-25 DIAGNOSIS — R39.9 UTI SYMPTOMS: ICD-10-CM

## 2023-07-25 LAB
B-HCG SERPL EIA 3RD IS-ACNC: <1 MIU/ML
BILIRUBIN, POC: NORMAL
BLOOD URINE, POC: NORMAL
CLARITY, POC: CLEAR
COLOR, POC: YELLOW
CONTROL: NORMAL
GLUCOSE URINE, POC: NORMAL
KETONES, POC: NORMAL
LEUKOCYTE EST, POC: NORMAL
NITRITE, POC: NORMAL
PH, POC: 5
PREGNANCY TEST URINE, POC: NEGATIVE
PROTEIN, POC: NORMAL
SPECIFIC GRAVITY, POC: 1.03
UROBILINOGEN, POC: 0.2

## 2023-07-25 PROCEDURE — 81003 URINALYSIS AUTO W/O SCOPE: CPT | Performed by: NURSE PRACTITIONER

## 2023-07-25 PROCEDURE — 99213 OFFICE O/P EST LOW 20 MIN: CPT | Performed by: NURSE PRACTITIONER

## 2023-07-25 PROCEDURE — 81025 URINE PREGNANCY TEST: CPT | Performed by: NURSE PRACTITIONER

## 2023-07-25 RX ORDER — NITROFURANTOIN 25; 75 MG/1; MG/1
100 CAPSULE ORAL 2 TIMES DAILY
Qty: 10 CAPSULE | Refills: 0 | Status: SHIPPED | OUTPATIENT
Start: 2023-07-25 | End: 2023-07-30

## 2023-07-25 RX ORDER — TRIAMCINOLONE ACETONIDE 5 MG/G
CREAM TOPICAL
Qty: 30 G | Refills: 0 | Status: SHIPPED | OUTPATIENT
Start: 2023-07-25

## 2023-07-25 RX ORDER — PANTOPRAZOLE SODIUM 20 MG/1
TABLET, DELAYED RELEASE ORAL
COMMUNITY
Start: 2023-06-20

## 2023-07-25 ASSESSMENT — ENCOUNTER SYMPTOMS
CHEST TIGHTNESS: 0
RESPIRATORY NEGATIVE: 1
COUGH: 0
SHORTNESS OF BREATH: 0
WHEEZING: 0

## 2023-07-25 NOTE — PROGRESS NOTES
consciousness-she does not feel the episode was a seizure and reports she was never on Rx for sz      Current Outpatient Medications   Medication Sig Dispense Refill    pantoprazole (PROTONIX) 20 MG tablet       triamcinolone (ARISTOCORT) 0.5 % cream Apply topically 3 times daily. 30 g 0    nitrofurantoin, macrocrystal-monohydrate, (MACROBID) 100 MG capsule Take 1 capsule by mouth 2 times daily for 5 days 10 capsule 0    albuterol sulfate HFA (VENTOLIN HFA) 108 (90 Base) MCG/ACT inhaler Inhale 2 puffs into the lungs every 6 hours as needed for Wheezing 18 g 3    albuterol sulfate HFA (VENTOLIN HFA) 108 (90 Base) MCG/ACT inhaler Inhale 2 puffs into the lungs every 6 hours as needed for Wheezing 1 each 2    hydrOXYzine HCl (ATARAX) 50 MG tablet Take 1 tablet by mouth every 8 hours as needed for Anxiety 90 tablet 5    azelastine (ASTELIN) 0.1 % nasal spray 2 sprays by Nasal route 2 times daily Use in each nostril as directed 60 mL 0    norelgestromin-ethinyl estradiol Kianna Massey) 150-35 MCG/24HR Place 1 patch onto the skin once a week (Patient not taking: Reported on 7/10/2023)      CHLOROPHYLL PO Take by mouth      ondansetron (ZOFRAN-ODT) 4 MG disintegrating tablet Take 1 tablet by mouth 3 times daily as needed for Nausea or Vomiting 15 tablet 0    tretinoin (RETIN-A) 0.01 % gel Apply topically nightly. (Patient not taking: Reported on 5/1/2023) 45 g 1    FENUGREEK PO Take by mouth      UNABLE TO FIND Slippery elm (Patient not taking: Reported on 7/10/2023)      pantoprazole (PROTONIX) 40 MG tablet Take 1 tablet by mouth every morning (before breakfast) 30 tablet 2    ibuprofen (ADVIL;MOTRIN) 600 MG tablet as needed (Patient not taking: Reported on 7/10/2023)      triamcinolone (KENALOG) 0.1 % cream Apply topically 2 times daily.  (Patient not taking: Reported on 7/25/2023) 30 g 0    vitamin D3 (CHOLECALCIFEROL) 25 MCG (1000 UT) TABS tablet Take 1 tablet by mouth daily (Patient not taking: Reported on 7/25/2023) 30

## 2023-09-03 DIAGNOSIS — R39.9 UTI SYMPTOMS: ICD-10-CM

## 2023-09-03 RX ORDER — NITROFURANTOIN 25; 75 MG/1; MG/1
CAPSULE ORAL
Qty: 10 CAPSULE | Refills: 0 | OUTPATIENT
Start: 2023-09-03

## 2023-09-03 RX ORDER — PHENAZOPYRIDINE HYDROCHLORIDE 200 MG/1
TABLET, FILM COATED ORAL
Qty: 15 TABLET | Refills: 0 | OUTPATIENT
Start: 2023-09-03

## 2023-09-10 ENCOUNTER — OFFICE VISIT (OUTPATIENT)
Dept: PRIMARY CARE CLINIC | Age: 23
End: 2023-09-10
Payer: COMMERCIAL

## 2023-09-10 ENCOUNTER — HOSPITAL ENCOUNTER (OUTPATIENT)
Age: 23
Setting detail: SPECIMEN
Discharge: HOME | End: 2023-09-10

## 2023-09-10 VITALS
WEIGHT: 210 LBS | TEMPERATURE: 98.1 F | BODY MASS INDEX: 30.06 KG/M2 | OXYGEN SATURATION: 100 % | HEIGHT: 70 IN | HEART RATE: 80 BPM

## 2023-09-10 DIAGNOSIS — N89.8 VAGINAL ITCHING: Primary | ICD-10-CM

## 2023-09-10 DIAGNOSIS — N89.8 VAGINAL ITCHING: ICD-10-CM

## 2023-09-10 DIAGNOSIS — R51.9 HEADACHE DISORDER: ICD-10-CM

## 2023-09-10 LAB
CANDIDA SPECIES: POSITIVE
GARDNERELLA VAGINALIS: POSITIVE
SOURCE: ABNORMAL
TRICHOMONAS: NEGATIVE

## 2023-09-10 PROCEDURE — 99213 OFFICE O/P EST LOW 20 MIN: CPT

## 2023-09-10 RX ORDER — FLUCONAZOLE 150 MG/1
150 TABLET ORAL
Qty: 2 TABLET | Refills: 0 | Status: SHIPPED | OUTPATIENT
Start: 2023-09-10

## 2023-09-10 RX ORDER — BUTALBITAL, ACETAMINOPHEN AND CAFFEINE 50; 325; 40 MG/1; MG/1; MG/1
1 TABLET ORAL EVERY 6 HOURS PRN
Qty: 12 TABLET | Refills: 0 | Status: SHIPPED | OUTPATIENT
Start: 2023-09-10 | End: 2023-09-13

## 2023-09-10 ASSESSMENT — ENCOUNTER SYMPTOMS
EYE DISCHARGE: 0
WHEEZING: 0
COLOR CHANGE: 0
SINUS PRESSURE: 0
EYES NEGATIVE: 1
ANAL BLEEDING: 0
PHOTOPHOBIA: 0
CHEST TIGHTNESS: 0
SINUS PAIN: 0
ABDOMINAL PAIN: 0
GASTROINTESTINAL NEGATIVE: 1
EYE ITCHING: 0
APNEA: 0
CONSTIPATION: 0
DIARRHEA: 0
BACK PAIN: 0
BLOOD IN STOOL: 0
CHOKING: 0
ABDOMINAL DISTENTION: 0
TROUBLE SWALLOWING: 0
SORE THROAT: 0
EYE PAIN: 0
VOMITING: 0
EYE REDNESS: 0
RHINORRHEA: 0
STRIDOR: 0
SHORTNESS OF BREATH: 0
FACIAL SWELLING: 0
NAUSEA: 0
COUGH: 0
VOICE CHANGE: 0
RESPIRATORY NEGATIVE: 1
RECTAL PAIN: 0

## 2023-09-11 ENCOUNTER — TELEPHONE (OUTPATIENT)
Dept: PRIMARY CARE CLINIC | Age: 23
End: 2023-09-11

## 2023-09-11 RX ORDER — METRONIDAZOLE 500 MG/1
500 TABLET ORAL 2 TIMES DAILY
Qty: 14 TABLET | Refills: 0 | Status: SHIPPED | OUTPATIENT
Start: 2023-09-11 | End: 2023-09-18

## 2023-09-19 ENCOUNTER — HOSPITAL ENCOUNTER (EMERGENCY)
Age: 23
Discharge: HOME | End: 2023-09-19
Attending: EMERGENCY MEDICINE
Payer: COMMERCIAL

## 2023-09-19 VITALS
TEMPERATURE: 98.2 F | RESPIRATION RATE: 18 BRPM | OXYGEN SATURATION: 99 % | SYSTOLIC BLOOD PRESSURE: 140 MMHG | DIASTOLIC BLOOD PRESSURE: 85 MMHG | HEART RATE: 99 BPM

## 2023-09-19 DIAGNOSIS — S41.111A LACERATION OF RIGHT UPPER EXTREMITY, INITIAL ENCOUNTER: Primary | ICD-10-CM

## 2023-09-19 PROCEDURE — 90471 IMMUNIZATION ADMIN: CPT

## 2023-09-19 PROCEDURE — 90715 TDAP VACCINE 7 YRS/> IM: CPT

## 2023-09-19 PROCEDURE — 99284 EMERGENCY DEPT VISIT MOD MDM: CPT

## 2023-09-19 PROCEDURE — 12002 RPR S/N/AX/GEN/TRNK2.6-7.5CM: CPT

## 2023-09-19 PROCEDURE — 6360000002 HC RX W HCPCS

## 2023-09-19 RX ORDER — IBUPROFEN 600 MG/1
600 TABLET ORAL EVERY 6 HOURS PRN
Qty: 30 TABLET | Refills: 0 | Status: SHIPPED | OUTPATIENT
Start: 2023-09-19

## 2023-09-19 RX ADMIN — TETANUS TOXOID, REDUCED DIPHTHERIA TOXOID AND ACELLULAR PERTUSSIS VACCINE, ADSORBED 0.5 ML: 5; 2.5; 8; 8; 2.5 SUSPENSION INTRAMUSCULAR at 21:29

## 2023-09-19 ASSESSMENT — PAIN DESCRIPTION - ORIENTATION: ORIENTATION: LEFT

## 2023-09-19 ASSESSMENT — PAIN DESCRIPTION - LOCATION: LOCATION: ARM

## 2023-09-19 ASSESSMENT — PAIN - FUNCTIONAL ASSESSMENT: PAIN_FUNCTIONAL_ASSESSMENT: 0-10

## 2023-09-19 ASSESSMENT — PAIN SCALES - GENERAL: PAINLEVEL_OUTOF10: 5

## 2023-09-20 NOTE — ED NOTES
Dr. Michael Pinto at bedside applying stitches to pt's right forearm     Le Levine RN  09/19/23 0114

## 2023-09-20 NOTE — ED PROVIDER NOTES
[facility] ED  Emergency Department Encounter  Emergency Medicine Resident     Pt Name:Holley Tucker  MRN: [id number]  [address] [facility] 2000  Date of evaluation: 9/19/23  PCP:  URIEL Ram CNP  Note Started: 9:24 PM EDT      CHIEF COMPLAINT       Chief Complaint   Patient presents with    Laceration       HISTORY OF PRESENT ILLNESS  (Location/Symptom, Timing/Onset, Context/Setting, Quality, Duration, Modifying Factors, Severity.)      Julienne Moses is a 26 y.o. female who presents with laceration to the right arm. Patient states she was trying to get out of her car when she excellently cut it on something in her car, she is not sure what object. Patient states bleeding has been controlled, has full strength and range of motion in her right arm and hand pain is minimal.  Patient denies attempt at self-harm, states this is an accident. Patient states she is not sure when her last tetanus shot was. PAST MEDICAL / SURGICAL / SOCIAL / FAMILY HISTORY      has a past medical history of Anxiety, Asthma, Cholecystitis, COVID, COVID-19 vaccine series not administered, Cyst of ovary, right, Depression, Eczema, GERD (gastroesophageal reflux disease), Headache, History of palpitations, Hx of chest pain, Hypoglycemia, IBS (irritable bowel syndrome), Iron malabsorption, Neurocardiogenic syncope, Pneumonia, PONV (postoperative nausea and vomiting), Prolonged emergence from general anesthesia, Reflux esophagitis, Seasonal allergies, and Seizure-like activity ([address] has a past surgical history that includes chest tube insertion (Left, 2006); pr egd transoral biopsy single/multiple (N/A, 05/31/2018); Cholecystectomy (03/16/2023); Esophagogastroduodenoscopy (05/01/2023); and Upper gastrointestinal endoscopy (N/A, 5/1/2023).       Social History     Socioeconomic History    Marital status: Single     Spouse name: Not on file    Number of children: Not on file    Years of education: Not on file

## 2023-09-20 NOTE — DISCHARGE INSTRUCTIONS
You were seen today in the emergency department for your laceration. We now feel you are safe for discharge home. Please go to the urgent care, you see your PCP or come to the emergency department to have the sutures removed in 5 to 7 days. Please return to the emergency department immediately if develop any new or worsening concerns including chest pain, shortness of breath, abdominal pain, nausea, vomiting, diarrhea, weakness, loss consciousness, fever, chills, or any other concerns. Please call your PCP and schedule appointment within the next 24 to 48 hours for follow-up.

## 2023-09-20 NOTE — ED NOTES
Pt. Arrives to ED via private auto for c/o laceration to right forearm. Pt states that 30 minutes prior to arrival pt cut her arm open in her car on an unknown object. Pt has full range of motion to her arm and fingers without any tingling sensation. Bleeding is controlled at this time. Pt is unaware of last TTD shot and rates her pain a 5/10.        Femi Calhoun RN  09/19/23 [id number]

## 2023-10-04 RX ORDER — PANTOPRAZOLE SODIUM 20 MG/1
20 TABLET, DELAYED RELEASE ORAL
Qty: 30 TABLET | Refills: 3 | Status: SHIPPED | OUTPATIENT
Start: 2023-10-04

## 2023-10-25 ENCOUNTER — TELEPHONE (OUTPATIENT)
Dept: FAMILY MEDICINE CLINIC | Age: 23
End: 2023-10-25

## 2023-10-25 NOTE — TELEPHONE ENCOUNTER
Called patient for more information, she states she has neurocardiogenic syncope and hasn't seen a cardiologist in a while and has been experiencing symptoms such as palpitations.      Offered to schedule appt, patient declined at that time as she was driving

## 2023-10-25 NOTE — TELEPHONE ENCOUNTER
----- Message from Dillon Gretta sent at 10/25/2023  2:53 PM EDT -----  Subject: Referral Request    Reason for referral request? pt would like a referral to cardio  Please   fax to Tejanita Maddie [phone]  Provider patient wants to be referred to(if known):     Provider Phone Number(if known):     Additional Information for Provider?   ---------------------------------------------------------------------------  --------------  [address]    [phone]; OK to leave message on voicemail  ---------------------------------------------------------------------------  --------------

## 2023-12-11 ENCOUNTER — OFFICE VISIT (OUTPATIENT)
Dept: FAMILY MEDICINE CLINIC | Age: 23
End: 2023-12-11
Payer: COMMERCIAL

## 2023-12-11 VITALS
TEMPERATURE: 97 F | DIASTOLIC BLOOD PRESSURE: 80 MMHG | HEIGHT: 70 IN | SYSTOLIC BLOOD PRESSURE: 120 MMHG | HEART RATE: 91 BPM | OXYGEN SATURATION: 98 % | WEIGHT: 231.4 LBS | BODY MASS INDEX: 33.13 KG/M2

## 2023-12-11 DIAGNOSIS — R07.9 CHEST PAIN, UNSPECIFIED TYPE: Primary | ICD-10-CM

## 2023-12-11 PROCEDURE — 99213 OFFICE O/P EST LOW 20 MIN: CPT | Performed by: NURSE PRACTITIONER

## 2023-12-11 RX ORDER — CLINDAMYCIN PHOSPHATE 10 UG/ML
LOTION TOPICAL
COMMUNITY
Start: 2023-11-21

## 2023-12-11 ASSESSMENT — ENCOUNTER SYMPTOMS
SHORTNESS OF BREATH: 0
ABDOMINAL PAIN: 0
VOMITING: 0
COUGH: 0
CHEST TIGHTNESS: 1
NAUSEA: 0
SINUS PAIN: 0
SORE THROAT: 0
DIARRHEA: 0

## 2023-12-11 NOTE — PROGRESS NOTES
[address] [facility]  [address]  [facility] [address]  Dept: [phone]  Dept Fax: [phone]    Alejo Saunders is a 26 y.o. female who presents today for her medicalconditions/complaints as noted below. Alejo Saunders is c/o of Chest Pain (Chest pain, palpations would like a referral to cardiology  ) and Abdominal Pain (Abd pain, cramping, bloating )      HPI:       22 old female patient presents with concern for chest pain abdominal pain. Patient describes that she is getting chest pain daily feels sharp tightness palpitations and irregularity n her heart rhythm. Patient has a history of cardiogenic syncope. Has had some abdominal pain and lower cramping. Does have upcoming follow-up with OB/GYN. History of GERD managed with Protonix.         Past Medical History:   Diagnosis Date    Anxiety     Asthma     Cholecystitis     COVID 04/27/2023    states has had covid 4-5 times---never hospitalized    COVID-19 vaccine series not administered     Cyst of ovary, right     multiple times    Depression     Eczema     GERD (gastroesophageal reflux disease)     Headache     History of palpitations     Hx of chest pain     Hypoglycemia     IBS (irritable bowel syndrome)     Iron malabsorption     hx iron transfusions    Neurocardiogenic syncope     Pneumonia     at age 5    PONV (postoperative nausea and vomiting)     Prolonged emergence from general anesthesia     Reflux esophagitis     Seasonal allergies     Seizure-like activity ([address])     pt denies diagnosis of Seizures, states it was an episode x 1 many years ago of shaking and sweating and lost consciousness-she does not feel the episode was a seizure and reports she was never on Rx for sz        Current Outpatient Medications   Medication Sig Dispense Refill    clindamycin (CLEOCIN T) 1 % lotion Apply to acne areas nightly after cleansing      pantoprazole (PROTONIX) 20 MG tablet TAKE ONE TABLET

## 2023-12-11 NOTE — PROGRESS NOTES
Visit Information    Have you changed or started any medications since your last visit including any over-the-counter medicines, vitamins, or herbal medicines? no   Have you stopped taking any of your medications? Is so, why? -  no  Are you having any side effects from any of your medications? - no    Have you seen any other physician or provider since your last visit?  no   Have you had any other diagnostic tests since your last visit?  no   Have you been seen in the emergency room and/or had an admission in a hospital since we last saw you?  no   Have you had your routine dental cleaning in the past 6 months?  no     Do you have an active MyChart account? If no, what is the barrier?   Yes    Patient Care Team:  URIEL Dumont CNP as PCP - General (Certified Nurse Practitioner)  URIEL Dumont CNP as PCP - Empaneled Provider    Medical History Review  Past Medical, Family, and Social History reviewed and  contribute to the patient presenting condition    Health Maintenance   Topic Date Due    COVID-19 Vaccine (1) Never done    HIV screen  Never done    Pap smear  Never done    Flu vaccine (1) 08/01/2023    Depression Monitoring  03/21/2024    Chlamydia/GC screen  07/10/2024    DTaP/Tdap/Td vaccine (8 - Td or Tdap) 09/19/2033    Hepatitis A vaccine  Completed    Hepatitis B vaccine  Completed    Hib vaccine  Completed    HPV vaccine  Completed    Varicella vaccine  Completed    Hepatitis C screen  Completed    Meningococcal (ACWY) vaccine  Aged Out    Pneumococcal 0-64 years Vaccine  Aged Out    Polio vaccine  Discontinued    Measles,Mumps,Rubella (MMR) vaccine  Discontinued

## 2024-01-02 ENCOUNTER — OFFICE VISIT (OUTPATIENT)
Dept: PRIMARY CARE CLINIC | Age: 24
End: 2024-01-02
Payer: COMMERCIAL

## 2024-01-02 ENCOUNTER — HOSPITAL ENCOUNTER (OUTPATIENT)
Age: 24
Setting detail: SPECIMEN
Discharge: HOME | End: 2024-01-02

## 2024-01-02 VITALS
OXYGEN SATURATION: 98 % | BODY MASS INDEX: 33.72 KG/M2 | SYSTOLIC BLOOD PRESSURE: 135 MMHG | WEIGHT: 235 LBS | TEMPERATURE: 98 F | DIASTOLIC BLOOD PRESSURE: 82 MMHG | HEART RATE: 94 BPM

## 2024-01-02 DIAGNOSIS — J01.90 ACUTE BACTERIAL SINUSITIS: Primary | ICD-10-CM

## 2024-01-02 DIAGNOSIS — J02.9 SORE THROAT: ICD-10-CM

## 2024-01-02 DIAGNOSIS — H65.93 MEE (MIDDLE EAR EFFUSION), BILATERAL: ICD-10-CM

## 2024-01-02 DIAGNOSIS — B96.89 ACUTE BACTERIAL SINUSITIS: Primary | ICD-10-CM

## 2024-01-02 DIAGNOSIS — R07.9 CHEST PAIN, UNSPECIFIED TYPE: ICD-10-CM

## 2024-01-02 LAB
ERYTHROCYTE [DISTWIDTH] IN BLOOD BY AUTOMATED COUNT: 13.9 % (ref 11.8–14.4)
HCT VFR BLD AUTO: 39.3 % (ref 36.3–47.1)
HGB BLD-MCNC: 11.9 G/DL (ref 11.9–15.1)
MCH RBC QN AUTO: 24.5 PG (ref 25.2–33.5)
MCHC RBC AUTO-ENTMCNC: 30.3 G/DL (ref 28.4–34.8)
MCV RBC AUTO: 81 FL (ref 82.6–102.9)
NRBC BLD-RTO: 0 PER 100 WBC
PLATELET # BLD AUTO: 297 K/UL (ref 138–453)
PMV BLD AUTO: 9.8 FL (ref 8.1–13.5)
RBC # BLD AUTO: 4.85 M/UL (ref 3.95–5.11)
WBC OTHER # BLD: 9.9 K/UL (ref 3.5–11.3)

## 2024-01-02 PROCEDURE — 99213 OFFICE O/P EST LOW 20 MIN: CPT

## 2024-01-02 RX ORDER — AMOXICILLIN AND CLAVULANATE POTASSIUM 875; 125 MG/1; MG/1
1 TABLET, FILM COATED ORAL 2 TIMES DAILY
Qty: 20 TABLET | Refills: 0 | Status: SHIPPED | OUTPATIENT
Start: 2024-01-02 | End: 2024-01-12

## 2024-01-02 RX ORDER — FLUTICASONE PROPIONATE 50 MCG
2 SPRAY, SUSPENSION (ML) NASAL DAILY
Qty: 16 G | Refills: 0 | Status: SHIPPED | OUTPATIENT
Start: 2024-01-02

## 2024-01-02 ASSESSMENT — ENCOUNTER SYMPTOMS
ANAL BLEEDING: 0
SHORTNESS OF BREATH: 0
ABDOMINAL DISTENTION: 0
SINUS PAIN: 0
BACK PAIN: 0
DIARRHEA: 0
COUGH: 0
EYE DISCHARGE: 0
EYE PAIN: 0
TROUBLE SWALLOWING: 0
VOMITING: 1
VISUAL CHANGE: 0
RECTAL PAIN: 0
CHANGE IN BOWEL HABIT: 0
CHEST TIGHTNESS: 0
SWOLLEN GLANDS: 0
RESPIRATORY NEGATIVE: 1
APNEA: 0
WHEEZING: 0
NAUSEA: 1
COLOR CHANGE: 0
VOICE CHANGE: 0
CHOKING: 0
STRIDOR: 0
FACIAL SWELLING: 0
BLOOD IN STOOL: 0
EYE ITCHING: 0
EYE REDNESS: 0
SORE THROAT: 1
RHINORRHEA: 0
ABDOMINAL PAIN: 0
PHOTOPHOBIA: 0
SINUS PRESSURE: 0
CONSTIPATION: 0
EYES NEGATIVE: 1

## 2024-01-02 NOTE — PROGRESS NOTES
BMI 33.72 kg/m²     Physical Exam  Vitals reviewed.   Constitutional:       Appearance: Normal appearance.   HENT:      Head: Normocephalic and atraumatic.      Right Ear: Tympanic membrane, ear canal and external ear normal.      Left Ear: Tympanic membrane, ear canal and external ear normal.      Nose: Congestion present.      Right Sinus: Maxillary sinus tenderness and frontal sinus tenderness present.      Left Sinus: Maxillary sinus tenderness and frontal sinus tenderness present.      Mouth/Throat:      Lips: Pink.      Mouth: Mucous membranes are moist.      Pharynx: Oropharynx is clear. Uvula midline. Posterior oropharyngeal erythema present. No pharyngeal swelling, oropharyngeal exudate or uvula swelling.      Tonsils: Tonsillar exudate (white bilaterally) present. No tonsillar abscesses.   Eyes:      Conjunctiva/sclera: Conjunctivae normal.      Pupils: Pupils are equal, round, and reactive to light.   Cardiovascular:      Rate and Rhythm: Normal rate and regular rhythm.      Pulses: Normal pulses.      Heart sounds: Normal heart sounds.   Pulmonary:      Effort: Pulmonary effort is normal.      Breath sounds: Normal breath sounds and air entry.   Musculoskeletal:      Cervical back: Normal range of motion and neck supple.   Lymphadenopathy:      Cervical: Cervical adenopathy present.   Skin:     General: Skin is warm and dry.      Capillary Refill: Capillary refill takes less than 2 seconds.   Neurological:      General: No focal deficit present.      Mental Status: She is alert and oriented to person, place, and time. Mental status is at baseline.      GCS: GCS eye subscore is 4. GCS verbal subscore is 5. GCS motor subscore is 6.   Psychiatric:         Mood and Affect: Mood normal.         Behavior: Behavior normal.         Plan:          1. Acute bacterial sinusitis  -     amoxicillin-clavulanate (AUGMENTIN) 875-125 MG per tablet; Take 1 tablet by mouth 2 times daily for 10 days, Disp-20 tablet,

## 2024-01-02 NOTE — PATIENT INSTRUCTIONS
Patient Education        Acute Sinusitis: Care Instructions  Overview     Acute sinusitis is an inflammation of the mucous membranes inside the nose and sinuses. Sinuses are the hollow spaces in your skull around the eyes and nose. Acute sinusitis often follows a cold. Acute sinusitis causes thick, discolored mucus that drains from the nose or down the back of the throat. It also can cause pain and pressure in your head and face along with a stuffy or blocked nose.  In most cases, sinusitis gets better on its own in 1 to 2 weeks. But some mild symptoms may last for several weeks. Sometimes antibiotics are needed if there is a bacterial infection.  Follow-up care is a key part of your treatment and safety. Be sure to make and go to all appointments, and call your doctor if you are having problems. It's also a good idea to know your test results and keep a list of the medicines you take.  How can you care for yourself at home?  Use saline (saltwater) nasal washes. This can help keep your nasal passages open and wash out mucus and allergens.  You can buy saline nose washes at a grocery store or drugstore. Follow the instructions on the package.  You can make your own at home. Add 1 teaspoon of non-iodized salt and 1 teaspoon of baking soda to 2 cups of distilled or boiled and cooled water. Fill a squeeze bottle or a nasal cleansing pot (such as a neti pot) with the nasal wash. Then put the tip into your nostril, and lean over the sink. With your mouth open, gently squirt the liquid. Repeat on the other side.  Try a decongestant nasal spray like oxymetazoline (Afrin). Do not use it for more than 3 days in a row. Using it for more than 3 days can make your congestion worse.  If needed, take an over-the-counter pain medicine, such as acetaminophen (Tylenol), ibuprofen (Advil, Motrin), or naproxen (Aleve). Read and follow all instructions on the label.  If the doctor prescribed antibiotics, take them as directed. Do not

## 2024-01-03 DIAGNOSIS — R79.89 ABNORMAL CBC: ICD-10-CM

## 2024-01-03 DIAGNOSIS — R73.09 ELEVATED RANDOM BLOOD GLUCOSE LEVEL: Primary | ICD-10-CM

## 2024-01-03 LAB
ALBUMIN SERPL-MCNC: 4.3 G/DL (ref 3.5–5.2)
ALBUMIN/GLOB SERPL: 1.2 {RATIO} (ref 1–2.5)
ALP SERPL-CCNC: 74 U/L (ref 35–104)
ALT SERPL-CCNC: 10 U/L (ref 5–33)
ANION GAP SERPL CALCULATED.3IONS-SCNC: 13 MMOL/L (ref 9–17)
AST SERPL-CCNC: 13 U/L
BILIRUB SERPL-MCNC: 0.6 MG/DL (ref 0.3–1.2)
BUN SERPL-MCNC: 9 MG/DL (ref 6–20)
CALCIUM SERPL-MCNC: 9.5 MG/DL (ref 8.6–10.4)
CHLORIDE SERPL-SCNC: 103 MMOL/L (ref 98–107)
CO2 SERPL-SCNC: 24 MMOL/L (ref 20–31)
CREAT SERPL-MCNC: 0.7 MG/DL (ref 0.5–0.9)
EST. AVERAGE GLUCOSE BLD GHB EST-MCNC: 94 MG/DL
GFR SERPL CREATININE-BSD FRML MDRD: >60 ML/MIN/1.73M2
GLUCOSE SERPL-MCNC: 113 MG/DL (ref 70–99)
HBA1C MFR BLD: 4.9 % (ref 4–6)
IRON SERPL-MCNC: 45 UG/DL (ref 37–145)
LIPASE SERPL-CCNC: 20 U/L (ref 13–60)
MICROORGANISM/AGENT SPEC: NORMAL
POTASSIUM SERPL-SCNC: 4 MMOL/L (ref 3.7–5.3)
PROT SERPL-MCNC: 7.8 G/DL (ref 6.4–8.3)
SARS-COV-2 RNA RESP QL NAA+PROBE: NORMAL
SARS-COV-2 RNA RESP QL NAA+PROBE: NOT DETECTED
SODIUM SERPL-SCNC: 140 MMOL/L (ref 135–144)
SOURCE: NORMAL
SPECIMEN DESCRIPTION: NORMAL
TSH SERPL DL<=0.05 MIU/L-ACNC: 1.12 UIU/ML (ref 0.3–5)

## 2024-01-25 ENCOUNTER — HOSPITAL ENCOUNTER (OUTPATIENT)
Age: 24
Discharge: HOME | End: 2024-01-27
Payer: COMMERCIAL

## 2024-01-25 DIAGNOSIS — R07.9 CHEST PAIN, UNSPECIFIED TYPE: ICD-10-CM

## 2024-01-25 LAB
ECHO AO ROOT DIAM: 2.3 CM
ECHO AV PEAK GRADIENT: 6 MMHG
ECHO AV PEAK VELOCITY: 1.2 M/S
ECHO LA AREA 4C: 18.3 CM2
ECHO LA DIAMETER: 3.5 CM
ECHO LA MAJOR AXIS: 5.5 CM
ECHO LA TO AORTIC ROOT RATIO: 1.52
ECHO LA VOL MOD A4C: 49 ML (ref 22–52)
ECHO LV E' LATERAL VELOCITY: 15 CM/S
ECHO LV E' SEPTAL VELOCITY: 11 CM/S
ECHO LV FRACTIONAL SHORTENING: 44 % (ref 28–44)
ECHO LV INTERNAL DIMENSION DIASTOLIC: 4.8 CM (ref 3.9–5.3)
ECHO LV INTERNAL DIMENSION SYSTOLIC: 2.7 CM
ECHO LV IVSD: 0.9 CM (ref 0.6–0.9)
ECHO LV MASS 2D: 137.1 G (ref 67–162)
ECHO LV POSTERIOR WALL DIASTOLIC: 0.8 CM (ref 0.6–0.9)
ECHO LV RELATIVE WALL THICKNESS RATIO: 0.33
ECHO MV A VELOCITY: 0.58 M/S
ECHO MV E DECELERATION TIME (DT): 130 MS
ECHO MV E VELOCITY: 0.74 M/S
ECHO MV E/A RATIO: 1.28
ECHO MV E/E' LATERAL: 4.93
ECHO MV E/E' RATIO (AVERAGED): 5.83

## 2024-01-25 PROCEDURE — 93306 TTE W/DOPPLER COMPLETE: CPT

## 2024-01-25 PROCEDURE — 93225 XTRNL ECG REC<48 HRS REC: CPT

## 2024-02-12 DIAGNOSIS — R07.9 CHEST PAIN, UNSPECIFIED TYPE: Primary | ICD-10-CM

## 2024-02-20 ENCOUNTER — OFFICE VISIT (OUTPATIENT)
Dept: PRIMARY CARE CLINIC | Age: 24
End: 2024-02-20
Payer: COMMERCIAL

## 2024-02-20 VITALS
TEMPERATURE: 98.3 F | SYSTOLIC BLOOD PRESSURE: 127 MMHG | OXYGEN SATURATION: 98 % | HEART RATE: 108 BPM | DIASTOLIC BLOOD PRESSURE: 85 MMHG

## 2024-02-20 DIAGNOSIS — J01.40 ACUTE NON-RECURRENT PANSINUSITIS: Primary | ICD-10-CM

## 2024-02-20 DIAGNOSIS — R68.89 FLU-LIKE SYMPTOMS: ICD-10-CM

## 2024-02-20 DIAGNOSIS — J02.9 SORE THROAT: ICD-10-CM

## 2024-02-20 LAB
INFLUENZA A ANTIGEN, POC: NEGATIVE
INFLUENZA B ANTIGEN, POC: NEGATIVE
LOT EXPIRE DATE: NORMAL
LOT KIT NUMBER: NORMAL
S PYO AG THROAT QL: NORMAL
SARS-COV-2, POC: NORMAL
VALID INTERNAL CONTROL: NORMAL
VENDOR AND KIT NAME POC: NORMAL

## 2024-02-20 PROCEDURE — 87880 STREP A ASSAY W/OPTIC: CPT | Performed by: FAMILY MEDICINE

## 2024-02-20 PROCEDURE — 87428 SARSCOV & INF VIR A&B AG IA: CPT | Performed by: FAMILY MEDICINE

## 2024-02-20 PROCEDURE — 99213 OFFICE O/P EST LOW 20 MIN: CPT | Performed by: FAMILY MEDICINE

## 2024-02-20 RX ORDER — AMOXICILLIN AND CLAVULANATE POTASSIUM 875; 125 MG/1; MG/1
1 TABLET, FILM COATED ORAL 2 TIMES DAILY
Qty: 14 TABLET | Refills: 0 | Status: SHIPPED | OUTPATIENT
Start: 2024-02-20 | End: 2024-02-27

## 2024-02-20 ASSESSMENT — ENCOUNTER SYMPTOMS
SORE THROAT: 1
CHANGE IN BOWEL HABIT: 1
COUGH: 1
VOMITING: 1

## 2024-02-21 NOTE — PROGRESS NOTES
[facility] PHYSICIANS [facility] IN [facility]  [address]  [facility] [address]  Dept: [phone]  Dept Fax: [phone]    Holley Jane is a 23 y.o. female who presents today for her medical conditions/complaintsas noted below.  Holley Jane is c/o of Head Congestion (Facial pain, sore throat, ha and feels like there's fluid in ears x 2 days ago and worsened- tried otc sinus meds but not working)        HPI:     Pharyngitis  This is a new problem. The current episode started in the past 7 days (2 days). The problem occurs constantly. The problem has been gradually worsening. Associated symptoms include a change in bowel habit, chills, congestion, coughing, headaches, a sore throat and vomiting (mucous). Pertinent negatives include no fever. Associated symptoms comments: Feels hot, Ear congestion, facial pain. Nothing aggravates the symptoms. Treatments tried: otc sinus meds. The treatment provided no relief.   Nephew is also sick, parents have been sick    Past Medical History:   Diagnosis Date    Anxiety     Asthma     Cholecystitis     COVID 04/27/2023    states has had covid 4-5 times---never hospitalized    COVID-19 vaccine series not administered     Cyst of ovary, right     multiple times    Depression     Eczema     GERD (gastroesophageal reflux disease)     Headache     History of palpitations     Hx of chest pain     Hypoglycemia     IBS (irritable bowel syndrome)     Iron malabsorption     hx iron transfusions    Neurocardiogenic syncope     Pneumonia     at age 5    PONV (postoperative nausea and vomiting)     Prolonged emergence from general anesthesia     Reflux esophagitis     Seasonal allergies     Seizure-like activity (HCC)     pt denies diagnosis of Seizures, states it was an episode x 1 many years ago of shaking and sweating and lost consciousness-she does not feel the episode was a seizure and reports she was never on Rx for sz

## 2024-02-24 ENCOUNTER — PATIENT MESSAGE (OUTPATIENT)
Dept: FAMILY MEDICINE CLINIC | Age: 24
End: 2024-02-24

## 2024-02-24 DIAGNOSIS — B37.31 YEAST VAGINITIS: Primary | ICD-10-CM

## 2024-02-24 NOTE — TELEPHONE ENCOUNTER
From: Holley Jane  To: Stephen Martinez  Sent: 2/24/2024 2:13 PM EST  Subject: Yeast Infection     Hello Dr. Martinez,    I’m having symptoms of a yeast infection and was hoping you’d be able to call in [facility].    Thank you,    Holley Jane

## 2024-02-25 RX ORDER — FLUCONAZOLE 150 MG/1
150 TABLET ORAL ONCE
Qty: 1 TABLET | Refills: 0 | Status: SHIPPED | OUTPATIENT
Start: 2024-02-25 | End: 2024-02-25

## 2024-02-26 ENCOUNTER — TELEPHONE (OUTPATIENT)
Dept: FAMILY MEDICINE CLINIC | Age: 24
End: 2024-02-26

## 2024-02-26 ENCOUNTER — OFFICE VISIT (OUTPATIENT)
Dept: PRIMARY CARE CLINIC | Age: 24
End: 2024-02-26
Payer: COMMERCIAL

## 2024-02-26 ENCOUNTER — HOSPITAL ENCOUNTER (OUTPATIENT)
Age: 24
Setting detail: SPECIMEN
Discharge: HOME | End: 2024-02-26

## 2024-02-26 VITALS
SYSTOLIC BLOOD PRESSURE: 127 MMHG | DIASTOLIC BLOOD PRESSURE: 84 MMHG | TEMPERATURE: 98.5 F | OXYGEN SATURATION: 98 % | HEART RATE: 82 BPM

## 2024-02-26 DIAGNOSIS — R39.9 UTI SYMPTOMS: ICD-10-CM

## 2024-02-26 DIAGNOSIS — N30.01 ACUTE CYSTITIS WITH HEMATURIA: Primary | ICD-10-CM

## 2024-02-26 DIAGNOSIS — N89.8 VAGINAL IRRITATION: ICD-10-CM

## 2024-02-26 LAB
BILIRUBIN, POC: ABNORMAL
BLOOD URINE, POC: ABNORMAL
CANDIDA SPECIES: POSITIVE
CLARITY, POC: CLEAR
COLOR, POC: YELLOW
GARDNERELLA VAGINALIS: NEGATIVE
GLUCOSE URINE, POC: ABNORMAL
KETONES, POC: ABNORMAL
LEUKOCYTE EST, POC: ABNORMAL
NITRITE, POC: ABNORMAL
PH, POC: 5.5
PROTEIN, POC: 30
SOURCE: ABNORMAL
SPECIFIC GRAVITY, POC: 1.03
TRICHOMONAS: NEGATIVE
UROBILINOGEN, POC: 0.2

## 2024-02-26 PROCEDURE — 99213 OFFICE O/P EST LOW 20 MIN: CPT | Performed by: FAMILY MEDICINE

## 2024-02-26 PROCEDURE — 81002 URINALYSIS NONAUTO W/O SCOPE: CPT | Performed by: FAMILY MEDICINE

## 2024-02-26 RX ORDER — PHENAZOPYRIDINE HYDROCHLORIDE 200 MG/1
200 TABLET, FILM COATED ORAL 3 TIMES DAILY PRN
Qty: 15 TABLET | Refills: 0 | Status: SHIPPED | OUTPATIENT
Start: 2024-02-26 | End: 2024-03-02

## 2024-02-26 RX ORDER — CEPHALEXIN 500 MG/1
500 CAPSULE ORAL 2 TIMES DAILY
Qty: 10 CAPSULE | Refills: 0 | Status: SHIPPED | OUTPATIENT
Start: 2024-02-26 | End: 2024-03-02

## 2024-02-26 RX ORDER — FLUCONAZOLE 150 MG/1
TABLET ORAL
COMMUNITY
Start: 2024-02-25 | End: 2024-02-27

## 2024-02-26 ASSESSMENT — ENCOUNTER SYMPTOMS: SORE THROAT: 0

## 2024-02-26 NOTE — PROGRESS NOTES
[facility] PHYSICIANS [facility] IN [facility]  [address]  [facility] [address]  Dept: [phone]  Dept Fax: [phone]    Holley Jane is a 23 y.o. female who presents today for her medical conditions/complaintsas noted below.  Holley Jane is c/o of OTHER (Thinks has group B infection - severe vaginal irritation/discomfort and hurts to wipe x 3 days ago and worsened - did take a diflucan tablet prescribed by Javier approx 24 hrs ago)        HPI:     Vaginal Itching  The patient's primary symptoms include genital itching. The patient's pertinent negatives include no genital lesions, vaginal bleeding or vaginal discharge. Primary symptoms comment: pain with wiping. This is a new problem. The current episode started in the past 7 days (3 days). The problem occurs constantly. The problem has been unchanged. She is not pregnant (LMP ended 2/16). Associated symptoms include dysuria. Pertinent negatives include no fever, hematuria or sore throat. Associated symptoms comments: Also still having sinus infection symptoms. Treatments tried: diflucan-24 hours ago, azo for yeast infection. The treatment provided no relief. She is sexually active. No, her partner does not have an STD. Her past medical history is significant for vaginosis.       Past Medical History:   Diagnosis Date    Anxiety     Asthma     Cholecystitis     COVID 04/27/2023    states has had covid 4-5 times---never hospitalized    COVID-19 vaccine series not administered     Cyst of ovary, right     multiple times    Depression     Eczema     GERD (gastroesophageal reflux disease)     Headache     History of palpitations     Hx of chest pain     Hypoglycemia     IBS (irritable bowel syndrome)     Iron malabsorption     hx iron transfusions    Neurocardiogenic syncope     Pneumonia     at age 5    PONV (postoperative nausea and vomiting)     Prolonged emergence from general anesthesia     Reflux

## 2024-02-26 NOTE — PATIENT INSTRUCTIONS
Urine in office shows possible UTI  Will send urine for culture and call with results  Will send vaginal swab for culture and call with results

## 2024-02-26 NOTE — TELEPHONE ENCOUNTER
Patient calling, she states she sent you a [employer] message a few days ago regarding possible yeast, patient states based on her symptoms she thinks she has group Strep B, she states she's has this in the past. Her symptoms are: irritation mainly going towards the back, hurts to wipe     She is asking if you can send something in for that?     Advised patient I would ask if you can send in or if you wanted her to be seen for further testing. Please advise

## 2024-02-27 LAB
MICROORGANISM SPEC CULT: NORMAL
SPECIMEN DESCRIPTION: NORMAL

## 2024-02-27 RX ORDER — FLUCONAZOLE 150 MG/1
150 TABLET ORAL ONCE
Qty: 2 TABLET | Refills: 0 | Status: SHIPPED | OUTPATIENT
Start: 2024-02-27 | End: 2024-02-27

## 2024-03-03 ENCOUNTER — PATIENT MESSAGE (OUTPATIENT)
Dept: PRIMARY CARE CLINIC | Age: 24
End: 2024-03-03

## 2024-03-03 NOTE — TELEPHONE ENCOUNTER
From: Holley Jane  To: Dr. Ivana Marie  Sent: 3/3/2024 12:24 PM EST  Subject: Follow Up    Hello Dr. Heath    I wanted to reach out because I am still having a lot of pain and discomfort even after taking the diflucan and the antibiotics. I am also having vaginal bleeding.    Thank you,  Holley

## 2024-04-09 ENCOUNTER — HOSPITAL ENCOUNTER (OUTPATIENT)
Age: 24
Setting detail: SPECIMEN
Discharge: HOME | End: 2024-04-09

## 2024-04-09 ENCOUNTER — OFFICE VISIT (OUTPATIENT)
Dept: FAMILY MEDICINE CLINIC | Age: 24
End: 2024-04-09
Payer: COMMERCIAL

## 2024-04-09 VITALS
DIASTOLIC BLOOD PRESSURE: 86 MMHG | TEMPERATURE: 97.3 F | WEIGHT: 230 LBS | HEART RATE: 84 BPM | SYSTOLIC BLOOD PRESSURE: 126 MMHG | BODY MASS INDEX: 32.93 KG/M2 | RESPIRATION RATE: 16 BRPM | HEIGHT: 70 IN | OXYGEN SATURATION: 98 %

## 2024-04-09 DIAGNOSIS — Z90.49 S/P CHOLECYSTECTOMY: ICD-10-CM

## 2024-04-09 DIAGNOSIS — E55.9 VITAMIN D INSUFFICIENCY: ICD-10-CM

## 2024-04-09 DIAGNOSIS — M25.50 ARTHRALGIA, UNSPECIFIED JOINT: ICD-10-CM

## 2024-04-09 DIAGNOSIS — R55 NEUROCARDIOGENIC SYNCOPE: ICD-10-CM

## 2024-04-09 DIAGNOSIS — K90.9 IRON MALABSORPTION: ICD-10-CM

## 2024-04-09 DIAGNOSIS — R00.2 PALPITATIONS: ICD-10-CM

## 2024-04-09 DIAGNOSIS — K21.9 GASTROESOPHAGEAL REFLUX DISEASE WITHOUT ESOPHAGITIS: ICD-10-CM

## 2024-04-09 DIAGNOSIS — R07.89 ATYPICAL CHEST PAIN: ICD-10-CM

## 2024-04-09 DIAGNOSIS — R07.89 ATYPICAL CHEST PAIN: Primary | ICD-10-CM

## 2024-04-09 PROBLEM — R87.619 ABNORMAL CERVICAL PAPANICOLAOU SMEAR: Status: ACTIVE | Noted: 2024-02-08

## 2024-04-09 PROBLEM — E28.2 POLYCYSTIC OVARY SYNDROME: Status: ACTIVE | Noted: 2020-05-21

## 2024-04-09 LAB
25(OH)D3 SERPL-MCNC: 14.6 NG/ML (ref 30–100)
ALBUMIN SERPL-MCNC: 4.2 G/DL (ref 3.5–5.2)
ALBUMIN/GLOB SERPL: 2 {RATIO} (ref 1–2.5)
ALP SERPL-CCNC: 54 U/L (ref 35–104)
ALT SERPL-CCNC: 17 U/L (ref 10–35)
ANION GAP SERPL CALCULATED.3IONS-SCNC: 10 MMOL/L (ref 9–16)
AST SERPL-CCNC: 21 U/L (ref 10–35)
BASOPHILS # BLD: 0.05 K/UL (ref 0–0.2)
BASOPHILS NFR BLD: 1 % (ref 0–2)
BILIRUB SERPL-MCNC: 0.3 MG/DL (ref 0–1.2)
BUN SERPL-MCNC: 9 MG/DL (ref 6–20)
CALCIUM SERPL-MCNC: 9.3 MG/DL (ref 8.6–10.4)
CHLORIDE SERPL-SCNC: 105 MMOL/L (ref 98–107)
CO2 SERPL-SCNC: 25 MMOL/L (ref 20–31)
CREAT SERPL-MCNC: 0.7 MG/DL (ref 0.5–0.9)
CRP SERPL HS-MCNC: <3 MG/L (ref 0–5)
EOSINOPHIL # BLD: 0.11 K/UL (ref 0–0.44)
EOSINOPHILS RELATIVE PERCENT: 1 % (ref 1–4)
ERYTHROCYTE [DISTWIDTH] IN BLOOD BY AUTOMATED COUNT: 14 % (ref 11.8–14.4)
ERYTHROCYTE [SEDIMENTATION RATE] IN BLOOD BY PHOTOMETRIC METHOD: 8 MM/HR (ref 0–20)
GFR SERPL CREATININE-BSD FRML MDRD: >90 ML/MIN/1.73M2
GLUCOSE SERPL-MCNC: 84 MG/DL (ref 74–99)
HCT VFR BLD AUTO: 39.1 % (ref 36.3–47.1)
HGB BLD-MCNC: 12 G/DL (ref 11.9–15.1)
IMM GRANULOCYTES # BLD AUTO: <0.03 K/UL (ref 0–0.3)
IMM GRANULOCYTES NFR BLD: 0 %
IRON SATN MFR SERPL: 6 % (ref 20–55)
IRON SERPL-MCNC: 23 UG/DL (ref 37–145)
LYMPHOCYTES NFR BLD: 1.85 K/UL (ref 1.1–3.7)
LYMPHOCYTES RELATIVE PERCENT: 23 % (ref 24–43)
MCH RBC QN AUTO: 24.4 PG (ref 25.2–33.5)
MCHC RBC AUTO-ENTMCNC: 30.7 G/DL (ref 28.4–34.8)
MCV RBC AUTO: 79.6 FL (ref 82.6–102.9)
MONOCYTES NFR BLD: 0.62 K/UL (ref 0.1–1.2)
MONOCYTES NFR BLD: 8 % (ref 3–12)
NEUTROPHILS NFR BLD: 67 % (ref 36–65)
NEUTS SEG NFR BLD: 5.33 K/UL (ref 1.5–8.1)
NRBC BLD-RTO: 0 PER 100 WBC
PLATELET # BLD AUTO: 271 K/UL (ref 138–453)
PMV BLD AUTO: 9.8 FL (ref 8.1–13.5)
POTASSIUM SERPL-SCNC: 4.2 MMOL/L (ref 3.7–5.3)
PROT SERPL-MCNC: 6.9 G/DL (ref 6.6–8.7)
RBC # BLD AUTO: 4.91 M/UL (ref 3.95–5.11)
RBC # BLD: ABNORMAL 10*6/UL
RHEUMATOID FACT SER NEPH-ACNC: <10 IU/ML (ref 0–13)
SODIUM SERPL-SCNC: 140 MMOL/L (ref 136–145)
TIBC SERPL-MCNC: 375 UG/DL (ref 250–450)
TSH SERPL DL<=0.05 MIU/L-ACNC: 2.59 UIU/ML (ref 0.27–4.2)
UNSATURATED IRON BINDING CAPACITY: 352 UG/DL (ref 112–347)
VIT B12 SERPL-MCNC: 408 PG/ML (ref 232–1245)
WBC OTHER # BLD: 8 K/UL (ref 3.5–11.3)

## 2024-04-09 PROCEDURE — 99214 OFFICE O/P EST MOD 30 MIN: CPT | Performed by: FAMILY MEDICINE

## 2024-04-09 RX ORDER — FLUCONAZOLE 150 MG/1
150 TABLET ORAL WEEKLY
COMMUNITY
Start: 2024-03-21 | End: 2024-09-11

## 2024-04-09 RX ORDER — CLOTRIMAZOLE 1 %
CREAM WITH APPLICATOR VAGINAL
COMMUNITY

## 2024-04-09 RX ORDER — CYCLOBENZAPRINE HCL 10 MG
10 TABLET ORAL NIGHTLY PRN
Qty: 10 TABLET | Refills: 0 | Status: SHIPPED | OUTPATIENT
Start: 2024-04-09 | End: 2024-04-19

## 2024-04-09 SDOH — ECONOMIC STABILITY: FOOD INSECURITY: WITHIN THE PAST 12 MONTHS, THE FOOD YOU BOUGHT JUST DIDN'T LAST AND YOU DIDN'T HAVE MONEY TO GET MORE.: NEVER TRUE

## 2024-04-09 SDOH — ECONOMIC STABILITY: FOOD INSECURITY: WITHIN THE PAST 12 MONTHS, YOU WORRIED THAT YOUR FOOD WOULD RUN OUT BEFORE YOU GOT MONEY TO BUY MORE.: NEVER TRUE

## 2024-04-09 SDOH — ECONOMIC STABILITY: INCOME INSECURITY: HOW HARD IS IT FOR YOU TO PAY FOR THE VERY BASICS LIKE FOOD, HOUSING, MEDICAL CARE, AND HEATING?: NOT HARD AT ALL

## 2024-04-09 ASSESSMENT — ENCOUNTER SYMPTOMS
ALLERGIC/IMMUNOLOGIC NEGATIVE: 1
CHEST TIGHTNESS: 1
EYES NEGATIVE: 1
ABDOMINAL DISTENTION: 1

## 2024-04-09 ASSESSMENT — PATIENT HEALTH QUESTIONNAIRE - PHQ9
2. FEELING DOWN, DEPRESSED OR HOPELESS: SEVERAL DAYS
10. IF YOU CHECKED OFF ANY PROBLEMS, HOW DIFFICULT HAVE THESE PROBLEMS MADE IT FOR YOU TO DO YOUR WORK, TAKE CARE OF THINGS AT HOME, OR GET ALONG WITH OTHER PEOPLE: EXTREMELY DIFFICULT
7. TROUBLE CONCENTRATING ON THINGS, SUCH AS READING THE NEWSPAPER OR WATCHING TELEVISION: MORE THAN HALF THE DAYS
SUM OF ALL RESPONSES TO PHQ QUESTIONS 1-9: 11
SUM OF ALL RESPONSES TO PHQ9 QUESTIONS 1 & 2: 2
9. THOUGHTS THAT YOU WOULD BE BETTER OFF DEAD, OR OF HURTING YOURSELF: NOT AT ALL
1. LITTLE INTEREST OR PLEASURE IN DOING THINGS: SEVERAL DAYS
6. FEELING BAD ABOUT YOURSELF - OR THAT YOU ARE A FAILURE OR HAVE LET YOURSELF OR YOUR FAMILY DOWN: NOT AT ALL
3. TROUBLE FALLING OR STAYING ASLEEP: NEARLY EVERY DAY
SUM OF ALL RESPONSES TO PHQ QUESTIONS 1-9: 11
5. POOR APPETITE OR OVEREATING: SEVERAL DAYS
8. MOVING OR SPEAKING SO SLOWLY THAT OTHER PEOPLE COULD HAVE NOTICED. OR THE OPPOSITE, BEING SO FIGETY OR RESTLESS THAT YOU HAVE BEEN MOVING AROUND A LOT MORE THAN USUAL: NOT AT ALL
4. FEELING TIRED OR HAVING LITTLE ENERGY: NEARLY EVERY DAY

## 2024-04-09 NOTE — PROGRESS NOTES
Visit Information    Have you changed or started any medications since your last visit including any over-the-counter medicines, vitamins, or herbal medicines? no   Are you having any side effects from any of your medications? -  no  Have you stopped taking any of your medications? Is so, why? -  no    Have you seen any other physician or provider since your last visit? No  Have you had any other diagnostic tests since your last visit? No  Have you been seen in the emergency room and/or had an admission to a hospital since we last saw you? No  Have you had your routine dental cleaning in the past 6 months? no    Have you activated your [email or web address] account? If not, what are your barriers? Yes     Patient Care Team:  Stephen Martinez APRN - CNP as PCP - General (Certified Nurse Practitioner)  Stephen Martinez APRN - CNP as PCP - Empaneled Provider    Medical History Review  Past Medical, Family, and Social History reviewed and does not contribute to the patient presenting condition    Health Maintenance   Topic Date Due    COVID-19 Vaccine (1) Never done    HIV screen  Never done    Pap smear  Never done    Depression Monitoring  03/21/2024    Flu vaccine (Season Ended) 08/01/2024    Chlamydia/GC screen  12/20/2024    DTaP/Tdap/Td vaccine (8 - Td or Tdap) 09/19/2033    Hepatitis A vaccine  Completed    Hepatitis B vaccine  Completed    Hib vaccine  Completed    HPV vaccine  Completed    Polio vaccine  Completed    Varicella vaccine  Completed    Hepatitis C screen  Completed    Meningococcal (ACWY) vaccine  Aged Out    Pneumococcal 0-64 years Vaccine  Aged Out    Measles,Mumps,Rubella (MMR) vaccine  Discontinued

## 2024-04-09 NOTE — PROGRESS NOTES
[facility]     Date of Visit:  2024  Patient Name: Holley Jane   Patient :  2000     CHIEF COMPLAINT:     Holley Jane is a 23 y.o. female who presents today for an general visit to be evaluated for the following condition(s):    Chief Complaint   Patient presents with    Chest Pain     Current SX include: broken blood vessels,  right side pain that radiates to the back, dry mouth abd pain muscle pain, fatigue, hair loss ,headaches, diarrhea, vaginal bleeding and blood in urine with shooting pain in the vaginal canal.   Wants tested for Lupus.        HISTORY OF PRESENT ILLNESS:       Following with Cardiology and OB/GYN thru [facility].    Chest Pain   This is a chronic problem. The current episode started more than 1 year ago. The onset quality is undetermined. The problem occurs intermittently. The problem has been waxing and waning. The pain is mild. Associated symptoms include palpitations.        REVIEW OF SYSTEMS:        Review of Systems   Constitutional:  Positive for fatigue.   HENT: Negative.     Eyes: Negative.    Respiratory:  Positive for chest tightness.    Cardiovascular:  Positive for chest pain and palpitations.   Gastrointestinal:  Positive for abdominal distention.   Endocrine: Negative.    Genitourinary: Negative.    Musculoskeletal:  Positive for arthralgias.   Skin: Negative.    Allergic/Immunologic: Negative.    Neurological: Negative.    Hematological: Negative.    Psychiatric/Behavioral:  The patient is nervous/anxious.         REVIEWED INFORMATION      Current Outpatient Medications   Medication Sig Dispense Refill    fluconazole (DIFLUCAN) 150 MG tablet Take 1 tablet by mouth once a week      cyclobenzaprine (FLEXERIL) 10 MG tablet Take 1 tablet by mouth nightly as needed for Muscle spasms 10 tablet 0    fluticasone (FLONASE) 50 MCG/ACT nasal spray 2 sprays by Each Nostril route daily (Patient taking differently: 2 sprays by Each Nostril route as needed)

## 2024-04-10 DIAGNOSIS — E61.1 IRON DEFICIENCY: Primary | ICD-10-CM

## 2024-04-10 RX ORDER — FERROUS SULFATE 325(65) MG
325 TABLET ORAL
Qty: 90 TABLET | Refills: 1 | Status: SHIPPED | OUTPATIENT
Start: 2024-04-10

## 2024-04-11 LAB
ANA SER QL IA: NEGATIVE
DSDNA IGG SER QL IA: <0.5 IU/ML
NUCLEAR IGG SER IA-RTO: 0.2 U/ML

## 2024-05-06 RX ORDER — PHENAZOPYRIDINE HYDROCHLORIDE 100 MG/1
100 TABLET, FILM COATED ORAL 3 TIMES DAILY PRN
Qty: 30 TABLET | Refills: 0 | Status: SHIPPED | OUTPATIENT
Start: 2024-05-06 | End: 2025-05-06

## 2024-05-06 RX ORDER — PANTOPRAZOLE SODIUM 20 MG/1
20 TABLET, DELAYED RELEASE ORAL DAILY
Qty: 30 TABLET | Refills: 3 | Status: SHIPPED | OUTPATIENT
Start: 2024-05-06

## 2024-05-06 NOTE — TELEPHONE ENCOUNTER
Pt is having burning and itching with urination frequently  pt is asking for pyridium to treat the sx

## 2024-07-20 ENCOUNTER — HOSPITAL ENCOUNTER (EMERGENCY)
Age: 24
Discharge: HOME | End: 2024-07-20
Attending: STUDENT IN AN ORGANIZED HEALTH CARE EDUCATION/TRAINING PROGRAM

## 2024-07-20 VITALS
SYSTOLIC BLOOD PRESSURE: 143 MMHG | WEIGHT: 230 LBS | TEMPERATURE: 98.4 F | HEART RATE: 87 BPM | DIASTOLIC BLOOD PRESSURE: 81 MMHG | RESPIRATION RATE: 12 BRPM | HEIGHT: 70 IN | BODY MASS INDEX: 32.93 KG/M2 | OXYGEN SATURATION: 96 %

## 2024-07-20 DIAGNOSIS — Z20.2 ENCOUNTER FOR ASSESSMENT OF STD EXPOSURE: Primary | ICD-10-CM

## 2024-07-20 DIAGNOSIS — N76.0 BV (BACTERIAL VAGINOSIS): ICD-10-CM

## 2024-07-20 DIAGNOSIS — B37.9 CANDIDIASIS: ICD-10-CM

## 2024-07-20 DIAGNOSIS — B96.89 BV (BACTERIAL VAGINOSIS): ICD-10-CM

## 2024-07-20 LAB
BILIRUB UR QL STRIP: NEGATIVE
C TRACH DNA SPEC QL PROBE+SIG AMP: NORMAL
CANDIDA SPECIES: POSITIVE
CLARITY UR: CLEAR
COLOR UR: YELLOW
GARDNERELLA VAGINALIS: POSITIVE
GLUCOSE UR STRIP-MCNC: NEGATIVE MG/DL
HCG UR QL: NEGATIVE
HGB UR QL STRIP.AUTO: NEGATIVE
KETONES UR STRIP-MCNC: NEGATIVE MG/DL
LEUKOCYTE ESTERASE UR QL STRIP: NEGATIVE
N GONORRHOEA DNA SPEC QL PROBE+SIG AMP: NORMAL
NITRITE UR QL STRIP: NEGATIVE
PH UR STRIP: 7 [PH] (ref 5–8)
PROT UR STRIP-MCNC: NEGATIVE MG/DL
SOURCE: ABNORMAL
SP GR UR STRIP: 1.02 (ref 1–1.03)
SPECIMEN DESCRIPTION: NORMAL
SPECIMEN SOURCE: NORMAL
STREP A, MOLECULAR: NEGATIVE
TRICHOMONAS: NEGATIVE
UROBILINOGEN UR STRIP-ACNC: NORMAL EU/DL (ref 0–1)

## 2024-07-20 PROCEDURE — 96372 THER/PROPH/DIAG INJ SC/IM: CPT

## 2024-07-20 PROCEDURE — 87491 CHLMYD TRACH DNA AMP PROBE: CPT

## 2024-07-20 PROCEDURE — 87651 STREP A DNA AMP PROBE: CPT

## 2024-07-20 PROCEDURE — 81025 URINE PREGNANCY TEST: CPT

## 2024-07-20 PROCEDURE — 6360000002 HC RX W HCPCS: Performed by: STUDENT IN AN ORGANIZED HEALTH CARE EDUCATION/TRAINING PROGRAM

## 2024-07-20 PROCEDURE — 87510 GARDNER VAG DNA DIR PROBE: CPT

## 2024-07-20 PROCEDURE — 87480 CANDIDA DNA DIR PROBE: CPT

## 2024-07-20 PROCEDURE — 81003 URINALYSIS AUTO W/O SCOPE: CPT

## 2024-07-20 PROCEDURE — 87660 TRICHOMONAS VAGIN DIR PROBE: CPT

## 2024-07-20 PROCEDURE — 6370000000 HC RX 637 (ALT 250 FOR IP): Performed by: STUDENT IN AN ORGANIZED HEALTH CARE EDUCATION/TRAINING PROGRAM

## 2024-07-20 PROCEDURE — 99284 EMERGENCY DEPT VISIT MOD MDM: CPT

## 2024-07-20 PROCEDURE — 87591 N.GONORRHOEAE DNA AMP PROB: CPT

## 2024-07-20 PROCEDURE — 2580000003 HC RX 258: Performed by: STUDENT IN AN ORGANIZED HEALTH CARE EDUCATION/TRAINING PROGRAM

## 2024-07-20 RX ORDER — AZITHROMYCIN 250 MG/1
1000 TABLET, FILM COATED ORAL ONCE
Status: COMPLETED | OUTPATIENT
Start: 2024-07-20 | End: 2024-07-20

## 2024-07-20 RX ORDER — FLUCONAZOLE 150 MG/1
150 TABLET ORAL ONCE
Qty: 1 TABLET | Refills: 0 | Status: SHIPPED | OUTPATIENT
Start: 2024-07-27 | End: 2024-07-27

## 2024-07-20 RX ORDER — FLUCONAZOLE 150 MG/1
150 TABLET ORAL ONCE
Status: COMPLETED | OUTPATIENT
Start: 2024-07-20 | End: 2024-07-20

## 2024-07-20 RX ORDER — METRONIDAZOLE 500 MG/1
500 TABLET ORAL 2 TIMES DAILY
Qty: 14 TABLET | Refills: 0 | Status: SHIPPED | OUTPATIENT
Start: 2024-07-20 | End: 2024-07-27

## 2024-07-20 RX ORDER — ONDANSETRON 4 MG/1
4 TABLET, ORALLY DISINTEGRATING ORAL ONCE
Status: COMPLETED | OUTPATIENT
Start: 2024-07-20 | End: 2024-07-20

## 2024-07-20 RX ADMIN — AZITHROMYCIN DIHYDRATE 1000 MG: 250 TABLET ORAL at 04:56

## 2024-07-20 RX ADMIN — FLUCONAZOLE 150 MG: 150 TABLET ORAL at 04:56

## 2024-07-20 RX ADMIN — ONDANSETRON 4 MG: 4 TABLET, ORALLY DISINTEGRATING ORAL at 03:41

## 2024-07-20 RX ADMIN — WATER 500 MG: 1 INJECTION INTRAMUSCULAR; INTRAVENOUS; SUBCUTANEOUS at 04:56

## 2024-07-20 ASSESSMENT — PAIN SCALES - GENERAL: PAINLEVEL_OUTOF10: 6

## 2024-07-20 ASSESSMENT — PAIN - FUNCTIONAL ASSESSMENT: PAIN_FUNCTIONAL_ASSESSMENT: 0-10

## 2024-07-20 NOTE — ED PROVIDER NOTES
[facility] EMERGENCY DEPARTMENT ENCOUNTER      Pt Name: Holley Jane  MRN: [id number]  Birthdate 2000  Date of evaluation: 7/20/24    CHIEF COMPLAINT       Chief Complaint   Patient presents with    Vaginal Bleeding     Intermittent for 2 wk    Pharyngitis     X 3 days    Emesis     Intermittent x 2 wk       HISTORY OF PRESENT ILLNESS   Holley Jane is a 23 y.o. female who presents with concern for STD as well as vaginal bleeding, sore throat ongoing for the past few days.  Vaginal bleed has been intermittent for the past 2 weeks.    PASTMEDICAL HISTORY     Past Medical History:   Diagnosis Date    Anxiety     Asthma     Cholecystitis     COVID 04/27/2023    states has had covid 4-5 times---never hospitalized    COVID-19 vaccine series not administered     Cyst of ovary, right     multiple times    Depression     Eczema     GERD (gastroesophageal reflux disease)     Headache     History of palpitations     Hx of chest pain     Hypoglycemia     IBS (irritable bowel syndrome)     Iron malabsorption     hx iron transfusions    Neurocardiogenic syncope     Pneumonia     at age 5    PONV (postoperative nausea and vomiting)     Prolonged emergence from general anesthesia     Reflux esophagitis     Seasonal allergies     Seizure-like activity (HCC)     pt denies diagnosis of Seizures, states it was an episode x 1 many years ago of shaking and sweating and lost consciousness-she does not feel the episode was a seizure and reports she was never on Rx for sz     Past Problem List  Patient Active Problem List   Diagnosis Code    Dysautonomia (HCC) G90.1    Chronic generalized abdominal pain R10.84, G89.29    Intermittent vomiting R11.10    Gastroesophageal reflux disease without esophagitis K21.9    Anxiety F41.9    Chronic tension-type headache, intractable G44.221    Depression F32.A    Fatigue R53.83    History of concussion Z87.820    Hyperopia of both eyes with astigmatism H52.03, H52.203    Migraines G43.909     Discussion:]    Patient is nontoxic overall well-appearing.  Denies any significant abdominal pain, nausea, vomiting.  Encouraged to follow-up with results in my chart and abstain from sexual activity or use protection until results have returned.    Patient is agreeable to empiric antibiotic treatment and is high risk.  Positive for BV and candidiasis.  Will treat empirically.  For GC chlamydia.  Otherwise nontoxic overall well-appearing.  No fever.      Based on the low acuity of concerning symptoms and improvement of symptoms, patient will be discharged with follow up and prescription information listed in the Disposition section.  Patient states they will follow-up with primary care physician and/or return to the emergency department should they experience a change or worsening of symptoms.  Patient will be discharged with resources: summary of visit, instructions, follow-up information, prescriptions if necessary.  Patient/ family instructed to read discharge paperwork. All of their questions and concerns were addressed.   Suspicion for any acute life-threatening processes is low.   Patient voices understanding of plan.        DATA FOR LAB AND RADIOLOGY TESTS ORDERED BELOW ARE REVIEWED BY THE ED CLINICIAN:    RADIOLOGY: All x-rays, CT, MRI, and formal ultrasound images (except ED bedside ultrasound) are read by the radiologist, see reports below, unless otherwise noted in MDM or here.  Reports below are reviewed by myself.  No orders to display       LABS: Lab orders shown below, the results are reviewed by myself, and all abnormals are listed below.  Labs Reviewed   VAGINITIS DNA PROBE - Abnormal; Notable for the following components:       Result Value    GARDNERELLA VAGINALIS POSITIVE (*)     Candida species POSITIVE (*)     All other components within normal limits   RAPID STREP SCREEN   C.TRACHOMATIS N.GONORRHOEAE DNA   URINALYSIS WITH REFLEX TO CULTURE   PREGNANCY, URINE       Vitals Reviewed:    Vitals:

## 2024-07-20 NOTE — ED NOTES
Pt arrived to ED with c/o vaginal bleeding, emesis and pharyngisitis. Pt states she had a new sexual partner. Pt states her symptoms started after her encounter with new partner. Pt states she is having vaginal discharge. Pt states she having intermitted emesis for 2 weeks. Pt state she got off her period 7/6/24 and has also had vaginal bleeding. Pt is A&Ox4.

## 2024-07-22 LAB
C TRACH DNA SPEC QL PROBE+SIG AMP: NEGATIVE
N GONORRHOEA DNA SPEC QL PROBE+SIG AMP: NEGATIVE
SPECIMEN DESCRIPTION: NORMAL

## 2024-08-09 NOTE — PATIENT INSTRUCTIONS
Department of Emergency Medicine     Written by: Khurram Lobo MD  Patient Name: Ludy Gale  Admit Date: 2024  8:07 PM  MRN: [id number]                   : 1977      ------------------------- CC-------------------------  Chief Complaint   Patient presents with    Pharyngitis    Headache    Generalized Body Aches    Diarrhea     ------------------------- HPI -------------------------    Ludy Gale is a 46 y.o. female who presents to the emergency department today complaining of 2 days of nausea, with decreased appetite, throat pain on swallowing, lightheadedness, dizziness.  Patient indicates while she was waiting in the waiting room, she developed left upper quadrant pain, by her rib that hurts when she breathes in and out.  Patient reported she overall does not feel well and cannot really specify what is the primary concern.  Patient took several ibuprofens prior to ED visit, does not want heavy pain medication. Denies hematochezia, melena, diarrhea, fever.     Patient with history of hypothyroidism, bladder obstruction, sarcoidosis but does not seek medical care because she does not have insurance.    Nursing notes were all reviewed and agreed with or any disagreements were addressed in the HPI.    REVIEW OF SYSTEMS:    Pertinent positives and negatives mentioned in the HPI/MDM.     ------------------------- PAST HISTORY -------------------------    I personally reviewed the following history:    Past Medical History:  has a past medical history of Anxiety, Brachial neuritis or radiculitis NOS, Depression, GERD (gastroesophageal reflux disease), Head injury, Neck pain, and Sarcoidosis of lung (HCC).    Past Surgical History:  has a past surgical history that includes Tonsillectomy; Appendectomy; Hand surgery (right);  section; and Finger surgery (Right, 2014).    Social History:  reports that she has never smoked. Her smokeless tobacco use includes chew. She reports current  Strep test in office negative  Flu and covid test in office negative  Take Augmentin as prescribed for sinus infection  Continue over the counter cough/cold medications as needed for symptoms  If symptoms worsen or do not improve please follow-up with PCP or return to clinic     core measure? No Exclusion criteria - the patient is NOT to be included for SEP-1 Core Measure due to: Infection is not suspected    Please see ED Course below for further MDM  ED Course as of 08/10/24 1127   u Aug 08, 2024   2151 EKG shows sinus bradycardia, 52 bpm, no acute elevations or depressions, QTc normal.  Similar to prior.  Interpreted by me. [FG]   Fri Aug 09, 2024   0229 On reassessment, patient denies nausea,  [MN]      ED Course User Index  [FG] Ferdinand Pierson DO  [MN] Khurram Lobo MD       Medications administered today:  Medications   sodium chloride 0.9 % bolus 1,000 mL (0 mLs IntraVENous Stopped 8/9/24 0149)   ondansetron (ZOFRAN) injection 4 mg (4 mg IntraVENous Given 8/8/24 2144)   iopamidol (ISOVUE-370) 76 % injection 75 mL (75 mLs IntraVENous Given 8/9/24 0042)   ibuprofen (ADVIL;MOTRIN) tablet 600 mg (600 mg Oral Given 8/9/24 0233)   lidocaine viscous hcl (XYLOCAINE) 2 % solution 15 mL (15 mLs Mouth/Throat Given 8/9/24 0236)       CONSULTS: discussion with bolded \"IP consult\", otherwise consult was likely placed by admitting service  None    PROCEDURES: Unless otherwise listed below, none    SOCIAL DETERMINANTS: None    ------------------------- ADDITIONAL PROVIDER NOTES ---------------------------    I have spoken to the patient and/or family regarding results and the proposed plan for disposition.  They are comfortable with management and treatment plans as discussed.    -------------------------------- IMPRESSION AND DISPOSITION ---------------------------------    IMPRESSION  1. Hypothyroidism, unspecified type    2. Nausea    3. Fatigue, unspecified type        DISPOSITION  Disposition: Discharge to home  Patient condition is good    NOTE: This report was transcribed using voice recognition software. Every effort was made to ensure accuracy; however, inadvertent computerized transcription errors may be present

## 2024-10-01 ENCOUNTER — OFFICE VISIT (OUTPATIENT)
Dept: FAMILY MEDICINE CLINIC | Age: 24
End: 2024-10-01
Payer: COMMERCIAL

## 2024-10-01 VITALS
BODY MASS INDEX: 33.99 KG/M2 | WEIGHT: 237.4 LBS | DIASTOLIC BLOOD PRESSURE: 80 MMHG | SYSTOLIC BLOOD PRESSURE: 136 MMHG | TEMPERATURE: 97.9 F | HEIGHT: 70 IN

## 2024-10-01 DIAGNOSIS — E55.9 VITAMIN D INSUFFICIENCY: ICD-10-CM

## 2024-10-01 DIAGNOSIS — D50.8 IRON DEFICIENCY ANEMIA SECONDARY TO INADEQUATE DIETARY IRON INTAKE: ICD-10-CM

## 2024-10-01 DIAGNOSIS — R10.9 ABDOMINAL PAIN, UNSPECIFIED ABDOMINAL LOCATION: ICD-10-CM

## 2024-10-01 DIAGNOSIS — R87.619 ABNORMAL CERVICAL PAPANICOLAOU SMEAR, UNSPECIFIED ABNORMAL PAP FINDING: Primary | ICD-10-CM

## 2024-10-01 DIAGNOSIS — R51.9 WORSENING HEADACHES: ICD-10-CM

## 2024-10-01 PROCEDURE — 99214 OFFICE O/P EST MOD 30 MIN: CPT | Performed by: NURSE PRACTITIONER

## 2024-10-01 RX ORDER — FLUCONAZOLE 150 MG/1
TABLET ORAL
COMMUNITY

## 2024-10-01 ASSESSMENT — ENCOUNTER SYMPTOMS
DIARRHEA: 0
NAUSEA: 1
SHORTNESS OF BREATH: 0
SORE THROAT: 0
COUGH: 0
VOMITING: 0
BLOOD IN STOOL: 1
SINUS PAIN: 0
ABDOMINAL PAIN: 1

## 2024-10-01 NOTE — PROGRESS NOTES
Visit Information    Have you changed or started any medications since your last visit including any over-the-counter medicines, vitamins, or herbal medicines? no   Have you stopped taking any of your medications? Is so, why? -  no  Are you having any side effects from any of your medications? - no    Have you seen any other physician or provider since your last visit?  no   Have you had any other diagnostic tests since your last visit?  no   Have you been seen in the emergency room and/or had an admission in a hospital since we last saw you?  no   Have you had your routine dental cleaning in the past 6 months?  no     Do you have an active MyChart account? If no, what is the barrier?  Yes    Patient Care Team:  Stephen Martinez APRN - CNP as PCP - General (Certified Nurse Practitioner)  Stephen Martinez APRN - CNP as PCP - Empaneled Provider    Medical History Review  Past Medical, Family, and Social History reviewed and  contribute to the patient presenting condition    Health Maintenance   Topic Date Due    HIV screen  Never done    Pap smear  Never done    Flu vaccine (1) 08/01/2024    COVID-19 Vaccine (1 - 2023-24 season) Never done    Depression Monitoring  04/09/2025    Chlamydia/GC screen  07/20/2025    DTaP/Tdap/Td vaccine (8 - Td or Tdap) 09/19/2033    Hepatitis A vaccine  Completed    Hepatitis B vaccine  Completed    Hib vaccine  Completed    HPV vaccine  Completed    Polio vaccine  Completed    Varicella vaccine  Completed    Hepatitis C screen  Completed    Meningococcal (ACWY) vaccine  Aged Out    Pneumococcal 0-64 years Vaccine  Aged Out    Measles,Mumps,Rubella (MMR) vaccine  Discontinued               
butalbital-acetaminophen-caffeine (FIORICET, ESGIC) -40 MG per tablet Take 1 tablet by mouth every 6 hours as needed for Headaches 12 tablet 0    triamcinolone (ARISTOCORT) 0.5 % cream Apply topically 3 times daily. (Patient not taking: Reported on 10/1/2024) 30 g 0     No current facility-administered medications for this visit.     Allergies   Allergen Reactions    Prednisone Shortness Of Breath       Subjective:      Review of Systems   Constitutional:  Positive for fatigue. Negative for chills and fever.   HENT:  Negative for ear pain, sinus pain and sore throat.    Respiratory:  Negative for cough and shortness of breath.    Cardiovascular:  Negative for chest pain and palpitations.   Gastrointestinal:  Positive for abdominal pain, blood in stool and nausea. Negative for diarrhea and vomiting.   Neurological:  Positive for headaches. Negative for dizziness.   All other systems reviewed and are negative.      :Objective     Physical Exam  Vitals and nursing note reviewed.   Constitutional:       General: She is not in acute distress.     Appearance: Normal appearance. She is not toxic-appearing.   Cardiovascular:      Rate and Rhythm: Normal rate.   Pulmonary:      Effort: Pulmonary effort is normal.      Breath sounds: Normal breath sounds.   Abdominal:      Palpations: Abdomen is soft.      Tenderness: There is abdominal tenderness.   Neurological:      General: No focal deficit present.      Mental Status: She is alert and oriented to person, place, and time.       /80 (Site: Right Upper Arm, Position: Sitting, Cuff Size: Large Adult)   Temp 97.9 °F (36.6 °C) (Tympanic)   Ht 1.778 m (5' 10\")   Wt 107.7 kg (237 lb 6.4 oz)   BMI 34.06 kg/m²     Lab Review   No visits with results within 2 Month(s) from this visit.   Latest known visit with results is:   Admission on 07/20/2024, Discharged on 07/20/2024   Component Date Value    Color, UA 07/20/2024 Yellow     Turbidity UA 07/20/2024 Clear

## 2024-11-03 ENCOUNTER — HOSPITAL ENCOUNTER (OUTPATIENT)
Age: 24
Setting detail: SPECIMEN
Discharge: HOME | End: 2024-11-03

## 2024-11-03 ENCOUNTER — OFFICE VISIT (OUTPATIENT)
Dept: PRIMARY CARE CLINIC | Age: 24
End: 2024-11-03

## 2024-11-03 VITALS
DIASTOLIC BLOOD PRESSURE: 80 MMHG | TEMPERATURE: 98.3 F | SYSTOLIC BLOOD PRESSURE: 123 MMHG | BODY MASS INDEX: 32.93 KG/M2 | WEIGHT: 230 LBS | HEIGHT: 70 IN

## 2024-11-03 DIAGNOSIS — N89.8 VAGINAL ITCHING: ICD-10-CM

## 2024-11-03 DIAGNOSIS — R30.0 DYSURIA: Primary | ICD-10-CM

## 2024-11-03 DIAGNOSIS — R30.0 DYSURIA: ICD-10-CM

## 2024-11-03 LAB
BILIRUBIN, POC: NEGATIVE
BLOOD URINE, POC: NEGATIVE
CLARITY, POC: NORMAL
COLOR, POC: YELLOW
GLUCOSE URINE, POC: NEGATIVE MG/DL
KETONES, POC: NEGATIVE MG/DL
LEUKOCYTE EST, POC: NEGATIVE
NITRITE, POC: NEGATIVE
PH, POC: 7
PROTEIN, POC: NEGATIVE MG/DL
SPECIFIC GRAVITY, POC: 1.02
UROBILINOGEN, POC: 0.2 MG/DL

## 2024-11-03 RX ORDER — CEPHALEXIN 500 MG/1
500 CAPSULE ORAL 2 TIMES DAILY
Qty: 10 CAPSULE | Refills: 0 | Status: SHIPPED | OUTPATIENT
Start: 2024-11-03 | End: 2024-11-08

## 2024-11-04 LAB
CANDIDA SPECIES: NEGATIVE
GARDNERELLA VAGINALIS: POSITIVE
MICROORGANISM SPEC CULT: ABNORMAL
SERVICE CMNT-IMP: ABNORMAL
SOURCE: ABNORMAL
SPECIMEN DESCRIPTION: ABNORMAL
TRICHOMONAS: NEGATIVE

## 2024-11-04 RX ORDER — METRONIDAZOLE 7.5 MG/G
1 GEL VAGINAL DAILY
Qty: 70 G | Refills: 0 | Status: SHIPPED | OUTPATIENT
Start: 2024-11-04 | End: 2024-11-09

## 2024-11-16 ENCOUNTER — HOSPITAL ENCOUNTER (OUTPATIENT)
Age: 24
Setting detail: SPECIMEN
Discharge: HOME | End: 2024-11-16

## 2024-11-16 ENCOUNTER — OFFICE VISIT (OUTPATIENT)
Dept: PRIMARY CARE CLINIC | Age: 24
End: 2024-11-16
Payer: COMMERCIAL

## 2024-11-16 VITALS
HEIGHT: 70 IN | SYSTOLIC BLOOD PRESSURE: 115 MMHG | WEIGHT: 230 LBS | DIASTOLIC BLOOD PRESSURE: 76 MMHG | HEART RATE: 84 BPM | BODY MASS INDEX: 32.93 KG/M2 | TEMPERATURE: 97.7 F

## 2024-11-16 DIAGNOSIS — N89.8 VAGINAL DISCHARGE: Primary | ICD-10-CM

## 2024-11-16 DIAGNOSIS — N89.8 VAGINAL DISCHARGE: ICD-10-CM

## 2024-11-16 LAB
CANDIDA SPECIES: NEGATIVE
GARDNERELLA VAGINALIS: NEGATIVE
SOURCE: NORMAL
TRICHOMONAS: NEGATIVE

## 2024-11-16 PROCEDURE — 99213 OFFICE O/P EST LOW 20 MIN: CPT

## 2024-11-16 RX ORDER — PYRIDOXINE HCL (VITAMIN B6) 25 MG
TABLET ORAL EVERY 8 HOURS
COMMUNITY
Start: 2024-10-23

## 2024-11-16 RX ORDER — ASPIRIN 81 MG/1
TABLET ORAL
COMMUNITY
Start: 2024-10-23

## 2024-11-16 ASSESSMENT — ENCOUNTER SYMPTOMS
ABDOMINAL PAIN: 0
RHINORRHEA: 0
EYES NEGATIVE: 1
VOICE CHANGE: 0
WHEEZING: 0
EYE PAIN: 0
EYE REDNESS: 0
GASTROINTESTINAL NEGATIVE: 1
ABDOMINAL DISTENTION: 0
STRIDOR: 0
SINUS PRESSURE: 0
SORE THROAT: 0
RESPIRATORY NEGATIVE: 1
VOMITING: 0
FACIAL SWELLING: 0
PHOTOPHOBIA: 0
BACK PAIN: 0
CHOKING: 0
CHEST TIGHTNESS: 0
APNEA: 0
COLOR CHANGE: 0
EYE ITCHING: 0
CONSTIPATION: 0
RECTAL PAIN: 0
ANAL BLEEDING: 0
DIARRHEA: 0
COUGH: 0
SHORTNESS OF BREATH: 0
TROUBLE SWALLOWING: 0
EYE DISCHARGE: 0
NAUSEA: 0
SINUS PAIN: 0
BLOOD IN STOOL: 0

## 2024-11-16 NOTE — PROGRESS NOTES
[facility], [facility] WALK IN CARE  [address]  [place] [address]    [facility] WALK IN [facility]ARE  [address]  [facility] [address]  Dept: [phone]     Holley Jane is a 24 y.o. female Established patient, who presents to the walk-in clinic today with conditions/complaints as noted below:    Chief Complaint   Patient presents with    Vaginal Itching     Seen 2 weeks ago for same thing, pt states after meds she still does not feel like it's getting better.          HPI:     Vaginal Itching  The patient's primary symptoms include genital itching. The patient's pertinent negatives include no genital lesions, genital odor, genital rash, missed menses, pelvic pain, vaginal bleeding or vaginal discharge. This is a new problem. Episode onset: 2 weeks ago. The problem occurs constantly. The problem has been waxing and waning. The patient is experiencing no pain. She is pregnant. Associated symptoms include dysuria. Pertinent negatives include no abdominal pain, anorexia, back pain, chills, constipation, diarrhea, discolored urine, fever, flank pain, frequency, headaches, hematuria, joint pain, joint swelling, nausea, painful intercourse, rash, sore throat, urgency or vomiting. Nothing aggravates the symptoms. Treatments tried: Metrogel, flagyl pills, cephalexin. The treatment provided no relief.     Pt tested + for BV on 11/3- she was prescribed Metrogel and does not believe it helped her symptoms. She was + for GB strep on urine culture, finished Keflex.  On 11/8- her OB sent her some Flagyl pills due to no improvement. She feels she felt better for 1-2 days but has since returned.   She has random feelings of discomfort and burning with urination.   She is currently 10 weeks pregnant.     Past Medical History:   Diagnosis Date    Anxiety     Asthma     Cholecystitis     COVID

## 2024-11-17 LAB
MICROORGANISM SPEC CULT: NORMAL
SERVICE CMNT-IMP: NORMAL
SPECIMEN DESCRIPTION: NORMAL

## 2025-01-18 ENCOUNTER — HOSPITAL ENCOUNTER (OUTPATIENT)
Age: 25
Setting detail: SPECIMEN
Discharge: HOME | End: 2025-01-18

## 2025-01-18 ENCOUNTER — OFFICE VISIT (OUTPATIENT)
Dept: PRIMARY CARE CLINIC | Age: 25
End: 2025-01-18

## 2025-01-18 VITALS
SYSTOLIC BLOOD PRESSURE: 122 MMHG | HEART RATE: 113 BPM | OXYGEN SATURATION: 99 % | TEMPERATURE: 97.1 F | DIASTOLIC BLOOD PRESSURE: 79 MMHG

## 2025-01-18 DIAGNOSIS — N89.8 VAGINAL DISCHARGE: ICD-10-CM

## 2025-01-18 DIAGNOSIS — N89.8 VAGINAL DISCHARGE: Primary | ICD-10-CM

## 2025-01-18 LAB
BILIRUBIN, POC: NORMAL
BLOOD URINE, POC: NORMAL
CLARITY, POC: CLEAR
COLOR, POC: YELLOW
GLUCOSE URINE, POC: NORMAL MG/DL
KETONES, POC: NORMAL MG/DL
LEUKOCYTE EST, POC: NORMAL
NITRITE, POC: NORMAL
PH, POC: 7.5
PROTEIN, POC: NORMAL MG/DL
SPECIFIC GRAVITY, POC: 1.01
UROBILINOGEN, POC: 0.2 MG/DL

## 2025-01-18 RX ORDER — METOCLOPRAMIDE 10 MG/1
10 TABLET ORAL EVERY 6 HOURS
COMMUNITY
Start: 2024-11-25

## 2025-01-18 RX ORDER — LANOLIN ALCOHOL/MO/W.PET/CERES
CREAM (GRAM) TOPICAL
COMMUNITY
Start: 2025-01-14

## 2025-01-18 RX ORDER — CLOTRIMAZOLE AND BETAMETHASONE DIPROPIONATE 10; .64 MG/G; MG/G
CREAM TOPICAL
Qty: 45 G | Refills: 0 | Status: SHIPPED | OUTPATIENT
Start: 2025-01-18

## 2025-01-18 RX ORDER — FAMOTIDINE 20 MG/1
20 TABLET, FILM COATED ORAL 2 TIMES DAILY
COMMUNITY
Start: 2024-12-04

## 2025-01-18 RX ORDER — TRIAMCINOLONE ACETONIDE 1 MG/G
1 OINTMENT TOPICAL 2 TIMES DAILY
COMMUNITY
Start: 2024-12-23

## 2025-01-18 RX ORDER — ONDANSETRON 4 MG/1
TABLET, FILM COATED ORAL
COMMUNITY
Start: 2024-12-10

## 2025-01-18 ASSESSMENT — ENCOUNTER SYMPTOMS
COUGH: 0
ABDOMINAL PAIN: 0
NAUSEA: 0
SHORTNESS OF BREATH: 0
RESPIRATORY NEGATIVE: 1
CHEST TIGHTNESS: 0
WHEEZING: 0

## 2025-01-18 NOTE — PROGRESS NOTES
[facility] PHYSICIANS [facility], [facility] [place] [facility]  [address]  [facility] [address]  Dept: [phone]     Holley Jane is a 24 y.o. female who presents to the urgent care today for her medicalconditions/complaints as noted below.  Holley Jane is c/o of OTHER (Sensitivity in urethra and anal area x 2 days ago with white discharge)    HPI:     Vaginal Itching  The patient's primary symptoms include genital itching, missed menses (currently 19 weeks pregnant) and vaginal discharge. The patient's pertinent negatives include no genital lesions, genital odor, genital rash, pelvic pain or vaginal bleeding. This is a new problem. The current episode started in the past 7 days. The problem has been gradually worsening. The pain is mild. Associated symptoms include dysuria (itching and irritation around the urethra). Pertinent negatives include no abdominal pain, anorexia, chills, discolored urine, fever, flank pain, headaches, hematuria, nausea, rash or urgency. The vaginal discharge was normal. Nothing aggravates the symptoms. She has tried nothing for the symptoms. The treatment provided no relief.     Currently 19 weeks pregnant.     Past Medical History:   Diagnosis Date    Anxiety     Asthma     Cholecystitis     COVID 04/27/2023    states has had covid 4-5 times---never hospitalized    COVID-19 vaccine series not administered     Cyst of ovary, right     multiple times    Depression     Eczema     GERD (gastroesophageal reflux disease)     Headache     History of palpitations     Hx of chest pain     Hypoglycemia     IBS (irritable bowel syndrome)     Iron malabsorption     hx iron transfusions    Neurocardiogenic syncope     Pneumonia     at age 5    PONV (postoperative nausea and vomiting)     Prolonged emergence from general anesthesia     Reflux esophagitis     Seasonal allergies     Seizure-like activity (HCC)     pt denies diagnosis of Seizures, states it

## 2025-01-19 LAB
CANDIDA SPECIES: NEGATIVE
GARDNERELLA VAGINALIS: NEGATIVE
MICROORGANISM SPEC CULT: NO GROWTH
SERVICE CMNT-IMP: NORMAL
SOURCE: NORMAL
SPECIMEN DESCRIPTION: NORMAL
TRICHOMONAS: NEGATIVE

## 2025-02-26 ENCOUNTER — HOSPITAL ENCOUNTER (OUTPATIENT)
Age: 25
Setting detail: SPECIMEN
Discharge: HOME | End: 2025-02-26

## 2025-02-26 ENCOUNTER — OFFICE VISIT (OUTPATIENT)
Dept: PRIMARY CARE CLINIC | Age: 25
End: 2025-02-26

## 2025-02-26 VITALS
HEART RATE: 103 BPM | OXYGEN SATURATION: 98 % | BODY MASS INDEX: 33.79 KG/M2 | DIASTOLIC BLOOD PRESSURE: 80 MMHG | SYSTOLIC BLOOD PRESSURE: 129 MMHG | WEIGHT: 236 LBS | HEIGHT: 70 IN | TEMPERATURE: 97.8 F

## 2025-02-26 DIAGNOSIS — N89.8 VAGINAL DISCHARGE: ICD-10-CM

## 2025-02-26 DIAGNOSIS — N94.89 VAGINAL BURNING: Primary | ICD-10-CM

## 2025-02-26 LAB
BILIRUBIN, POC: NEGATIVE
BLOOD URINE, POC: NEGATIVE
CLARITY, POC: CLEAR
COLOR, POC: NORMAL
GLUCOSE URINE, POC: NEGATIVE MG/DL
KETONES, POC: NEGATIVE MG/DL
LEUKOCYTE EST, POC: NEGATIVE
NITRITE, POC: NEGATIVE
PH, POC: 6
PROTEIN, POC: NORMAL MG/DL
SPECIFIC GRAVITY, POC: >=1.03
UROBILINOGEN, POC: 0.2 MG/DL

## 2025-02-26 ASSESSMENT — ENCOUNTER SYMPTOMS
PHOTOPHOBIA: 0
EYE ITCHING: 0
COLOR CHANGE: 0
APNEA: 0
SINUS PRESSURE: 0
DIARRHEA: 0
EYES NEGATIVE: 1
NAUSEA: 0
STRIDOR: 0
ANAL BLEEDING: 0
BLOOD IN STOOL: 0
BACK PAIN: 0
ABDOMINAL DISTENTION: 0
CHOKING: 0
TROUBLE SWALLOWING: 0
VOMITING: 0
VOICE CHANGE: 0
SHORTNESS OF BREATH: 0
GASTROINTESTINAL NEGATIVE: 1
RECTAL PAIN: 0
WHEEZING: 0
EYE DISCHARGE: 0
EYE REDNESS: 0
RESPIRATORY NEGATIVE: 1
RHINORRHEA: 0
ABDOMINAL PAIN: 0
COUGH: 0
SORE THROAT: 0
CHEST TIGHTNESS: 0
EYE PAIN: 0
SINUS PAIN: 0
FACIAL SWELLING: 0
CONSTIPATION: 0

## 2025-02-26 NOTE — PROGRESS NOTES
[facility], [facility] WALK IN CARE  [address]  [place] [address]    [facility] WALK IN [facility]ARE  [address]  [facility] [address]  Dept: [phone]     Holley Jane is a 24 y.o. female Established patient, who presents to the walk-in clinic today with conditions/complaints as noted below:    Chief Complaint   Patient presents with    Vaginal Pain     Itching burning and shooting pains vaginally for 1 week  does have clear discharge is 25 weeks pregnant         HPI:     Vaginal Pain  The patient's primary symptoms include genital itching and vaginal discharge. The patient's pertinent negatives include no genital lesions, genital odor, genital rash, missed menses, pelvic pain or vaginal bleeding. This is a new problem. The current episode started in the past 7 days. The problem occurs constantly. The problem has been gradually worsening. The patient is experiencing no pain. The problem affects both sides. She is pregnant. Associated symptoms include dysuria. Pertinent negatives include no abdominal pain, anorexia, back pain, chills, constipation, diarrhea, discolored urine, fever, flank pain, frequency, headaches, hematuria, joint pain, joint swelling, nausea, painful intercourse, rash, sore throat, urgency or vomiting. The vaginal discharge was clear. There has been no bleeding. She has not been passing clots. She has not been passing tissue. Nothing aggravates the symptoms. She has tried nothing for the symptoms.       Past Medical History:   Diagnosis Date    Anxiety     Asthma     Cholecystitis     COVID 04/27/2023    states has had covid 4-5 times---never hospitalized    COVID-19 vaccine series not administered     Cyst of ovary, right     multiple times    Depression     Eczema     GERD (gastroesophageal reflux disease)     Headache     History of palpitations     Hx of

## 2025-02-27 DIAGNOSIS — N89.8 VAGINAL DISCHARGE: ICD-10-CM

## 2025-02-27 LAB
CANDIDA SPECIES: NEGATIVE
GARDNERELLA VAGINALIS: NEGATIVE
SOURCE: NORMAL
TRICHOMONAS: NEGATIVE

## 2025-02-28 LAB
MICROORGANISM SPEC CULT: NORMAL
SERVICE CMNT-IMP: NORMAL
SPECIMEN DESCRIPTION: NORMAL

## (undated) DEVICE — FORCEPS BX L240CM WRK CHN 2.8MM STD CAP W/ NDL MIC MESH